# Patient Record
Sex: MALE | Race: WHITE | Employment: OTHER | URBAN - METROPOLITAN AREA
[De-identification: names, ages, dates, MRNs, and addresses within clinical notes are randomized per-mention and may not be internally consistent; named-entity substitution may affect disease eponyms.]

---

## 2017-06-02 PROBLEM — I63.9 CRYPTOGENIC STROKE (HCC): Status: ACTIVE | Noted: 2017-06-02

## 2017-09-18 PROBLEM — Z86.73 HISTORY OF CVA (CEREBROVASCULAR ACCIDENT) WITHOUT RESIDUAL DEFICITS: Status: ACTIVE | Noted: 2017-09-18

## 2017-09-18 PROBLEM — G45.9 TIA (TRANSIENT ISCHEMIC ATTACK): Status: ACTIVE | Noted: 2017-09-18

## 2018-04-10 ENCOUNTER — NURSE ONLY (OUTPATIENT)
Dept: NON INVASIVE DIAGNOSTICS | Age: 68
End: 2018-04-10
Payer: MEDICARE

## 2018-04-10 DIAGNOSIS — Z95.818 STATUS POST PLACEMENT OF IMPLANTABLE LOOP RECORDER: Primary | ICD-10-CM

## 2018-04-10 DIAGNOSIS — I63.512 CEREBROVASCULAR ACCIDENT (CVA) DUE TO OCCLUSION OF LEFT MIDDLE CEREBRAL ARTERY (HCC): ICD-10-CM

## 2018-04-10 PROCEDURE — 93299 PR PR INTERRO EVALM REMOTE, UP TO 30 DAYS: CPT | Performed by: INTERNAL MEDICINE

## 2018-04-10 PROCEDURE — 93298 REM INTERROG DEV EVAL SCRMS: CPT | Performed by: INTERNAL MEDICINE

## 2018-04-13 ENCOUNTER — TELEPHONE (OUTPATIENT)
Dept: NON INVASIVE DIAGNOSTICS | Age: 68
End: 2018-04-13

## 2018-05-14 ENCOUNTER — OFFICE VISIT (OUTPATIENT)
Dept: NON INVASIVE DIAGNOSTICS | Age: 68
End: 2018-05-14
Payer: MEDICARE

## 2018-05-14 VITALS
RESPIRATION RATE: 16 BRPM | HEART RATE: 68 BPM | BODY MASS INDEX: 35.04 KG/M2 | DIASTOLIC BLOOD PRESSURE: 80 MMHG | WEIGHT: 264.4 LBS | HEIGHT: 73 IN | SYSTOLIC BLOOD PRESSURE: 120 MMHG

## 2018-05-14 DIAGNOSIS — Z95.818 STATUS POST PLACEMENT OF IMPLANTABLE LOOP RECORDER: Primary | ICD-10-CM

## 2018-05-14 PROCEDURE — 3017F COLORECTAL CA SCREEN DOC REV: CPT | Performed by: NURSE PRACTITIONER

## 2018-05-14 PROCEDURE — G8427 DOCREV CUR MEDS BY ELIG CLIN: HCPCS | Performed by: NURSE PRACTITIONER

## 2018-05-14 PROCEDURE — 4040F PNEUMOC VAC/ADMIN/RCVD: CPT | Performed by: NURSE PRACTITIONER

## 2018-05-14 PROCEDURE — 1123F ACP DISCUSS/DSCN MKR DOCD: CPT | Performed by: NURSE PRACTITIONER

## 2018-05-14 PROCEDURE — G8417 CALC BMI ABV UP PARAM F/U: HCPCS | Performed by: NURSE PRACTITIONER

## 2018-05-14 PROCEDURE — G8598 ASA/ANTIPLAT THER USED: HCPCS | Performed by: NURSE PRACTITIONER

## 2018-05-14 PROCEDURE — 93291 INTERROG DEV EVAL SCRMS IP: CPT | Performed by: INTERNAL MEDICINE

## 2018-05-14 PROCEDURE — 4004F PT TOBACCO SCREEN RCVD TLK: CPT | Performed by: NURSE PRACTITIONER

## 2018-05-14 PROCEDURE — 99213 OFFICE O/P EST LOW 20 MIN: CPT | Performed by: NURSE PRACTITIONER

## 2018-05-19 ENCOUNTER — APPOINTMENT (OUTPATIENT)
Dept: CT IMAGING | Age: 68
DRG: 065 | End: 2018-05-19
Payer: MEDICARE

## 2018-05-19 ENCOUNTER — HOSPITAL ENCOUNTER (INPATIENT)
Age: 68
LOS: 4 days | Discharge: NURSING, REHAB OR LONG-TERM CARE | DRG: 065 | End: 2018-05-23
Attending: EMERGENCY MEDICINE | Admitting: INTERNAL MEDICINE
Payer: MEDICARE

## 2018-05-19 ENCOUNTER — APPOINTMENT (OUTPATIENT)
Dept: GENERAL RADIOLOGY | Age: 68
DRG: 065 | End: 2018-05-19
Payer: MEDICARE

## 2018-05-19 DIAGNOSIS — I63.9 ACUTE ISCHEMIC STROKE (HCC): Primary | ICD-10-CM

## 2018-05-19 PROBLEM — Z86.73 HISTORY OF TIA (TRANSIENT ISCHEMIC ATTACK): Chronic | Status: ACTIVE | Noted: 2017-09-18

## 2018-05-19 PROBLEM — Z86.73 HISTORY OF CVA (CEREBROVASCULAR ACCIDENT) WITHOUT RESIDUAL DEFICITS: Chronic | Status: ACTIVE | Noted: 2017-09-18

## 2018-05-19 LAB
ALBUMIN SERPL-MCNC: 3.9 G/DL (ref 3.5–5.2)
ALP BLD-CCNC: 28 U/L (ref 40–129)
ALT SERPL-CCNC: 30 U/L (ref 0–40)
ANION GAP SERPL CALCULATED.3IONS-SCNC: 12 MMOL/L (ref 7–16)
AST SERPL-CCNC: 59 U/L (ref 0–39)
BASOPHILS ABSOLUTE: 0.04 E9/L (ref 0–0.2)
BASOPHILS RELATIVE PERCENT: 0.3 % (ref 0–2)
BILIRUB SERPL-MCNC: 0.3 MG/DL (ref 0–1.2)
BILIRUBIN URINE: NEGATIVE
BLOOD, URINE: NEGATIVE
BUN BLDV-MCNC: 18 MG/DL (ref 8–23)
CALCIUM SERPL-MCNC: 8.5 MG/DL (ref 8.6–10.2)
CHLORIDE BLD-SCNC: 101 MMOL/L (ref 98–107)
CHP ED QC CHECK: YES
CHP ED QC CHECK: YES
CLARITY: CLEAR
CO2: 23 MMOL/L (ref 22–29)
COLOR: YELLOW
CREAT SERPL-MCNC: 0.8 MG/DL (ref 0.7–1.2)
EKG ATRIAL RATE: 79 BPM
EKG P AXIS: 45 DEGREES
EKG P-R INTERVAL: 152 MS
EKG Q-T INTERVAL: 372 MS
EKG QRS DURATION: 84 MS
EKG QTC CALCULATION (BAZETT): 426 MS
EKG R AXIS: 34 DEGREES
EKG T AXIS: 99 DEGREES
EKG VENTRICULAR RATE: 79 BPM
EOSINOPHILS ABSOLUTE: 0.09 E9/L (ref 0.05–0.5)
EOSINOPHILS RELATIVE PERCENT: 0.6 % (ref 0–6)
GFR AFRICAN AMERICAN: >60
GFR AFRICAN AMERICAN: >60
GFR NON-AFRICAN AMERICAN: >60 ML/MIN/1.73
GFR NON-AFRICAN AMERICAN: >60 ML/MIN/1.73
GLUCOSE BLD-MCNC: 132 MG/DL (ref 74–109)
GLUCOSE BLD-MCNC: 146 MG/DL (ref 74–109)
GLUCOSE BLD-MCNC: 159 MG/DL
GLUCOSE BLD-MCNC: 159 MG/DL
GLUCOSE URINE: NEGATIVE MG/DL
HCT VFR BLD CALC: 46.9 % (ref 37–54)
HEMOGLOBIN: 16 G/DL (ref 12.5–16.5)
IMMATURE GRANULOCYTES #: 0.07 E9/L
IMMATURE GRANULOCYTES %: 0.5 % (ref 0–5)
INR BLD: 1.1
KETONES, URINE: NEGATIVE MG/DL
LEUKOCYTE ESTERASE, URINE: NEGATIVE
LYMPHOCYTES ABSOLUTE: 2.64 E9/L (ref 1.5–4)
LYMPHOCYTES RELATIVE PERCENT: 17.2 % (ref 20–42)
MCH RBC QN AUTO: 32.3 PG (ref 26–35)
MCHC RBC AUTO-ENTMCNC: 34.1 % (ref 32–34.5)
MCV RBC AUTO: 94.6 FL (ref 80–99.9)
METER GLUCOSE: 159 MG/DL (ref 70–110)
MONOCYTES ABSOLUTE: 0.63 E9/L (ref 0.1–0.95)
MONOCYTES RELATIVE PERCENT: 4.1 % (ref 2–12)
NEUTROPHILS ABSOLUTE: 11.9 E9/L (ref 1.8–7.3)
NEUTROPHILS RELATIVE PERCENT: 77.3 % (ref 43–80)
NITRITE, URINE: NEGATIVE
PDW BLD-RTO: 13.2 FL (ref 11.5–15)
PH UA: 5.5 (ref 5–9)
PLATELET # BLD: 227 E9/L (ref 130–450)
PMV BLD AUTO: 10 FL (ref 7–12)
POC CHLORIDE: 107 MMOL/L (ref 100–108)
POC CREATININE: 1 MG/DL (ref 0.7–1.2)
POC POTASSIUM: 4.5 MMOL/L (ref 3.5–5)
POC SODIUM: 138 MMOL/L (ref 132–146)
POTASSIUM SERPL-SCNC: 8.3 MMOL/L (ref 3.5–5)
PROTEIN UA: NEGATIVE MG/DL
PROTHROMBIN TIME: 12.3 SEC (ref 9.3–12.4)
RBC # BLD: 4.96 E12/L (ref 3.8–5.8)
SODIUM BLD-SCNC: 136 MMOL/L (ref 132–146)
SPECIFIC GRAVITY UA: 1.01 (ref 1–1.03)
TOTAL PROTEIN: 6.9 G/DL (ref 6.4–8.3)
TROPONIN: <0.01 NG/ML (ref 0–0.03)
UROBILINOGEN, URINE: 0.2 E.U./DL
WBC # BLD: 15.4 E9/L (ref 4.5–11.5)

## 2018-05-19 PROCEDURE — 2580000003 HC RX 258: Performed by: FAMILY MEDICINE

## 2018-05-19 PROCEDURE — 84132 ASSAY OF SERUM POTASSIUM: CPT

## 2018-05-19 PROCEDURE — 81003 URINALYSIS AUTO W/O SCOPE: CPT

## 2018-05-19 PROCEDURE — 72128 CT CHEST SPINE W/O DYE: CPT

## 2018-05-19 PROCEDURE — 36415 COLL VENOUS BLD VENIPUNCTURE: CPT

## 2018-05-19 PROCEDURE — 6360000002 HC RX W HCPCS: Performed by: EMERGENCY MEDICINE

## 2018-05-19 PROCEDURE — 2060000000 HC ICU INTERMEDIATE R&B

## 2018-05-19 PROCEDURE — 6360000004 HC RX CONTRAST MEDICATION: Performed by: RADIOLOGY

## 2018-05-19 PROCEDURE — 82962 GLUCOSE BLOOD TEST: CPT

## 2018-05-19 PROCEDURE — 70450 CT HEAD/BRAIN W/O DYE: CPT

## 2018-05-19 PROCEDURE — 80053 COMPREHEN METABOLIC PANEL: CPT

## 2018-05-19 PROCEDURE — 70496 CT ANGIOGRAPHY HEAD: CPT

## 2018-05-19 PROCEDURE — 72131 CT LUMBAR SPINE W/O DYE: CPT

## 2018-05-19 PROCEDURE — 0042T CT BRAIN PERFUSION: CPT

## 2018-05-19 PROCEDURE — 84484 ASSAY OF TROPONIN QUANT: CPT

## 2018-05-19 PROCEDURE — 82947 ASSAY GLUCOSE BLOOD QUANT: CPT

## 2018-05-19 PROCEDURE — 71045 X-RAY EXAM CHEST 1 VIEW: CPT

## 2018-05-19 PROCEDURE — 6370000000 HC RX 637 (ALT 250 FOR IP): Performed by: EMERGENCY MEDICINE

## 2018-05-19 PROCEDURE — 99291 CRITICAL CARE FIRST HOUR: CPT

## 2018-05-19 PROCEDURE — 94760 N-INVAS EAR/PLS OXIMETRY 1: CPT

## 2018-05-19 PROCEDURE — 85025 COMPLETE CBC W/AUTO DIFF WBC: CPT

## 2018-05-19 PROCEDURE — 85610 PROTHROMBIN TIME: CPT

## 2018-05-19 PROCEDURE — 84295 ASSAY OF SERUM SODIUM: CPT

## 2018-05-19 PROCEDURE — 93005 ELECTROCARDIOGRAM TRACING: CPT | Performed by: EMERGENCY MEDICINE

## 2018-05-19 PROCEDURE — 6360000002 HC RX W HCPCS: Performed by: FAMILY MEDICINE

## 2018-05-19 PROCEDURE — 70498 CT ANGIOGRAPHY NECK: CPT

## 2018-05-19 PROCEDURE — 6370000000 HC RX 637 (ALT 250 FOR IP): Performed by: FAMILY MEDICINE

## 2018-05-19 PROCEDURE — 82565 ASSAY OF CREATININE: CPT

## 2018-05-19 PROCEDURE — 82435 ASSAY OF BLOOD CHLORIDE: CPT

## 2018-05-19 RX ORDER — METOPROLOL TARTRATE 50 MG/1
50 TABLET, FILM COATED ORAL 2 TIMES DAILY
Status: DISCONTINUED | OUTPATIENT
Start: 2018-05-19 | End: 2018-05-19

## 2018-05-19 RX ORDER — METOPROLOL TARTRATE 50 MG/1
50 TABLET, FILM COATED ORAL 2 TIMES DAILY
Status: DISCONTINUED | OUTPATIENT
Start: 2018-05-19 | End: 2018-05-23 | Stop reason: HOSPADM

## 2018-05-19 RX ORDER — SODIUM CHLORIDE 0.9 % (FLUSH) 0.9 %
10 SYRINGE (ML) INJECTION EVERY 12 HOURS SCHEDULED
Status: DISCONTINUED | OUTPATIENT
Start: 2018-05-19 | End: 2018-05-23 | Stop reason: HOSPADM

## 2018-05-19 RX ORDER — METOPROLOL TARTRATE 50 MG/1
50 TABLET, FILM COATED ORAL 2 TIMES DAILY
COMMUNITY

## 2018-05-19 RX ORDER — KETOROLAC TROMETHAMINE 30 MG/ML
15 INJECTION, SOLUTION INTRAMUSCULAR; INTRAVENOUS ONCE
Status: COMPLETED | OUTPATIENT
Start: 2018-05-19 | End: 2018-05-19

## 2018-05-19 RX ORDER — ACETAMINOPHEN 325 MG/1
650 TABLET ORAL EVERY 4 HOURS PRN
Status: DISCONTINUED | OUTPATIENT
Start: 2018-05-19 | End: 2018-05-23 | Stop reason: HOSPADM

## 2018-05-19 RX ORDER — IBUPROFEN 200 MG
400 TABLET ORAL PRN
Status: ON HOLD | COMMUNITY
End: 2018-05-23 | Stop reason: HOSPADM

## 2018-05-19 RX ORDER — CAPTOPRIL 12.5 MG/1
50 TABLET ORAL 2 TIMES DAILY
Status: DISCONTINUED | OUTPATIENT
Start: 2018-05-19 | End: 2018-05-22

## 2018-05-19 RX ORDER — ENALAPRILAT 2.5 MG/2ML
1.25 INJECTION INTRAVENOUS EVERY 6 HOURS PRN
Status: DISCONTINUED | OUTPATIENT
Start: 2018-05-19 | End: 2018-05-23 | Stop reason: HOSPADM

## 2018-05-19 RX ORDER — ASPIRIN 300 MG/1
300 SUPPOSITORY RECTAL ONCE
Status: COMPLETED | OUTPATIENT
Start: 2018-05-19 | End: 2018-05-19

## 2018-05-19 RX ORDER — CAPTOPRIL 50 MG/1
50 TABLET ORAL 3 TIMES DAILY
Status: ON HOLD | COMMUNITY
End: 2021-04-30 | Stop reason: HOSPADM

## 2018-05-19 RX ORDER — SODIUM CHLORIDE 0.9 % (FLUSH) 0.9 %
10 SYRINGE (ML) INJECTION PRN
Status: DISCONTINUED | OUTPATIENT
Start: 2018-05-19 | End: 2018-05-23 | Stop reason: HOSPADM

## 2018-05-19 RX ORDER — ATORVASTATIN CALCIUM 40 MG/1
80 TABLET, FILM COATED ORAL DAILY
Status: DISCONTINUED | OUTPATIENT
Start: 2018-05-19 | End: 2018-05-23 | Stop reason: HOSPADM

## 2018-05-19 RX ORDER — CLOPIDOGREL BISULFATE 75 MG/1
75 TABLET ORAL DAILY
Status: DISCONTINUED | OUTPATIENT
Start: 2018-05-19 | End: 2018-05-23 | Stop reason: HOSPADM

## 2018-05-19 RX ORDER — ACETAMINOPHEN 325 MG/1
650 TABLET ORAL PRN
COMMUNITY
End: 2018-05-19

## 2018-05-19 RX ORDER — ASPIRIN 81 MG/1
81 TABLET ORAL EVERY OTHER DAY
Status: DISCONTINUED | OUTPATIENT
Start: 2018-05-20 | End: 2018-05-21

## 2018-05-19 RX ORDER — ONDANSETRON 2 MG/ML
4 INJECTION INTRAMUSCULAR; INTRAVENOUS EVERY 6 HOURS PRN
Status: DISCONTINUED | OUTPATIENT
Start: 2018-05-19 | End: 2018-05-23 | Stop reason: HOSPADM

## 2018-05-19 RX ORDER — ACETAMINOPHEN,DIPHENHYDRAMINE HCL 500; 25 MG/1; MG/1
2 TABLET, FILM COATED ORAL NIGHTLY PRN
Status: ON HOLD | COMMUNITY
End: 2018-05-23 | Stop reason: HOSPADM

## 2018-05-19 RX ADMIN — CAPTOPRIL 50 MG: 12.5 TABLET ORAL at 19:50

## 2018-05-19 RX ADMIN — Medication 10 ML: at 19:50

## 2018-05-19 RX ADMIN — METOPROLOL TARTRATE 50 MG: 50 TABLET ORAL at 19:49

## 2018-05-19 RX ADMIN — CLOPIDOGREL 75 MG: 75 TABLET, FILM COATED ORAL at 19:50

## 2018-05-19 RX ADMIN — IOPAMIDOL 100 ML: 755 INJECTION, SOLUTION INTRAVENOUS at 11:54

## 2018-05-19 RX ADMIN — ASPIRIN 300 MG: 300 SUPPOSITORY RECTAL at 13:06

## 2018-05-19 RX ADMIN — ATORVASTATIN CALCIUM 80 MG: 40 TABLET, FILM COATED ORAL at 19:50

## 2018-05-19 RX ADMIN — ENOXAPARIN SODIUM 40 MG: 40 INJECTION SUBCUTANEOUS at 17:27

## 2018-05-19 RX ADMIN — ACETAMINOPHEN 650 MG: 325 TABLET, FILM COATED ORAL at 22:43

## 2018-05-19 RX ADMIN — KETOROLAC TROMETHAMINE 15 MG: 30 INJECTION, SOLUTION INTRAMUSCULAR; INTRAVENOUS at 14:49

## 2018-05-19 ASSESSMENT — PAIN DESCRIPTION - ORIENTATION
ORIENTATION: RIGHT
ORIENTATION: RIGHT
ORIENTATION: LEFT

## 2018-05-19 ASSESSMENT — PAIN SCALES - GENERAL
PAINLEVEL_OUTOF10: 5
PAINLEVEL_OUTOF10: 2
PAINLEVEL_OUTOF10: 3
PAINLEVEL_OUTOF10: 5
PAINLEVEL_OUTOF10: 0

## 2018-05-19 ASSESSMENT — PAIN DESCRIPTION - ONSET: ONSET: GRADUAL

## 2018-05-19 ASSESSMENT — PAIN DESCRIPTION - PAIN TYPE
TYPE: ACUTE PAIN

## 2018-05-19 ASSESSMENT — PAIN DESCRIPTION - LOCATION
LOCATION: BACK;LEG
LOCATION: HEAD;EYE
LOCATION: HEAD

## 2018-05-19 ASSESSMENT — PAIN DESCRIPTION - FREQUENCY
FREQUENCY: CONTINUOUS
FREQUENCY: CONTINUOUS

## 2018-05-19 ASSESSMENT — PAIN DESCRIPTION - DESCRIPTORS
DESCRIPTORS: HEADACHE
DESCRIPTORS: HEADACHE;DISCOMFORT;ACHING

## 2018-05-19 ASSESSMENT — PAIN DESCRIPTION - PROGRESSION: CLINICAL_PROGRESSION: NOT CHANGED

## 2018-05-20 ENCOUNTER — APPOINTMENT (OUTPATIENT)
Dept: GENERAL RADIOLOGY | Age: 68
DRG: 065 | End: 2018-05-20
Payer: MEDICARE

## 2018-05-20 LAB
ANION GAP SERPL CALCULATED.3IONS-SCNC: 12 MMOL/L (ref 7–16)
BUN BLDV-MCNC: 19 MG/DL (ref 8–23)
CALCIUM SERPL-MCNC: 8.7 MG/DL (ref 8.6–10.2)
CHLORIDE BLD-SCNC: 102 MMOL/L (ref 98–107)
CO2: 24 MMOL/L (ref 22–29)
CREAT SERPL-MCNC: 1 MG/DL (ref 0.7–1.2)
GFR AFRICAN AMERICAN: >60
GFR NON-AFRICAN AMERICAN: >60 ML/MIN/1.73
GLUCOSE BLD-MCNC: 109 MG/DL (ref 74–109)
HCT VFR BLD CALC: 43.1 % (ref 37–54)
HEMOGLOBIN: 14.8 G/DL (ref 12.5–16.5)
MCH RBC QN AUTO: 32.4 PG (ref 26–35)
MCHC RBC AUTO-ENTMCNC: 34.3 % (ref 32–34.5)
MCV RBC AUTO: 94.3 FL (ref 80–99.9)
PDW BLD-RTO: 13.4 FL (ref 11.5–15)
PLATELET # BLD: 197 E9/L (ref 130–450)
PMV BLD AUTO: 9.6 FL (ref 7–12)
POTASSIUM REFLEX MAGNESIUM: 4 MMOL/L (ref 3.5–5)
RBC # BLD: 4.57 E12/L (ref 3.8–5.8)
SODIUM BLD-SCNC: 138 MMOL/L (ref 132–146)
WBC # BLD: 12.2 E9/L (ref 4.5–11.5)

## 2018-05-20 PROCEDURE — 6370000000 HC RX 637 (ALT 250 FOR IP): Performed by: FAMILY MEDICINE

## 2018-05-20 PROCEDURE — 80048 BASIC METABOLIC PNL TOTAL CA: CPT

## 2018-05-20 PROCEDURE — 73552 X-RAY EXAM OF FEMUR 2/>: CPT

## 2018-05-20 PROCEDURE — 6360000002 HC RX W HCPCS: Performed by: FAMILY MEDICINE

## 2018-05-20 PROCEDURE — 2700000000 HC OXYGEN THERAPY PER DAY

## 2018-05-20 PROCEDURE — G8988 SELF CARE GOAL STATUS: HCPCS

## 2018-05-20 PROCEDURE — 2060000000 HC ICU INTERMEDIATE R&B

## 2018-05-20 PROCEDURE — G8987 SELF CARE CURRENT STATUS: HCPCS

## 2018-05-20 PROCEDURE — 2580000003 HC RX 258: Performed by: FAMILY MEDICINE

## 2018-05-20 PROCEDURE — 97535 SELF CARE MNGMENT TRAINING: CPT

## 2018-05-20 PROCEDURE — 85027 COMPLETE CBC AUTOMATED: CPT

## 2018-05-20 PROCEDURE — 73562 X-RAY EXAM OF KNEE 3: CPT

## 2018-05-20 PROCEDURE — 97167 OT EVAL HIGH COMPLEX 60 MIN: CPT

## 2018-05-20 PROCEDURE — 36415 COLL VENOUS BLD VENIPUNCTURE: CPT

## 2018-05-20 RX ORDER — HYDROCODONE BITARTRATE AND ACETAMINOPHEN 5; 325 MG/1; MG/1
1 TABLET ORAL EVERY 4 HOURS PRN
Status: DISCONTINUED | OUTPATIENT
Start: 2018-05-20 | End: 2018-05-22

## 2018-05-20 RX ORDER — HYDROCODONE BITARTRATE AND ACETAMINOPHEN 5; 325 MG/1; MG/1
2 TABLET ORAL EVERY 4 HOURS PRN
Status: DISCONTINUED | OUTPATIENT
Start: 2018-05-20 | End: 2018-05-21

## 2018-05-20 RX ADMIN — METOPROLOL TARTRATE 50 MG: 50 TABLET ORAL at 09:16

## 2018-05-20 RX ADMIN — Medication 10 ML: at 09:15

## 2018-05-20 RX ADMIN — ASPIRIN 81 MG: 81 TABLET, COATED ORAL at 09:16

## 2018-05-20 RX ADMIN — HYDROCODONE BITARTRATE AND ACETAMINOPHEN 2 TABLET: 5; 325 TABLET ORAL at 17:45

## 2018-05-20 RX ADMIN — ACETAMINOPHEN 650 MG: 325 TABLET, FILM COATED ORAL at 03:52

## 2018-05-20 RX ADMIN — HYDROCODONE BITARTRATE AND ACETAMINOPHEN 2 TABLET: 5; 325 TABLET ORAL at 22:13

## 2018-05-20 RX ADMIN — HYDROCODONE BITARTRATE AND ACETAMINOPHEN 2 TABLET: 5; 325 TABLET ORAL at 13:40

## 2018-05-20 RX ADMIN — CLOPIDOGREL 75 MG: 75 TABLET, FILM COATED ORAL at 09:16

## 2018-05-20 RX ADMIN — CAPTOPRIL 50 MG: 12.5 TABLET ORAL at 20:02

## 2018-05-20 RX ADMIN — CAPTOPRIL 50 MG: 12.5 TABLET ORAL at 09:16

## 2018-05-20 RX ADMIN — ENOXAPARIN SODIUM 40 MG: 40 INJECTION SUBCUTANEOUS at 09:15

## 2018-05-20 RX ADMIN — METOPROLOL TARTRATE 50 MG: 50 TABLET ORAL at 20:02

## 2018-05-20 RX ADMIN — ACETAMINOPHEN 650 MG: 325 TABLET, FILM COATED ORAL at 09:15

## 2018-05-20 RX ADMIN — ATORVASTATIN CALCIUM 80 MG: 40 TABLET, FILM COATED ORAL at 09:16

## 2018-05-20 RX ADMIN — Medication 10 ML: at 20:03

## 2018-05-20 ASSESSMENT — PAIN DESCRIPTION - ORIENTATION
ORIENTATION: LEFT

## 2018-05-20 ASSESSMENT — PAIN DESCRIPTION - DESCRIPTORS: DESCRIPTORS: SPASM;SHOOTING;SHARP

## 2018-05-20 ASSESSMENT — PAIN SCALES - GENERAL
PAINLEVEL_OUTOF10: 5
PAINLEVEL_OUTOF10: 0
PAINLEVEL_OUTOF10: 8
PAINLEVEL_OUTOF10: 3
PAINLEVEL_OUTOF10: 9
PAINLEVEL_OUTOF10: 7
PAINLEVEL_OUTOF10: 3
PAINLEVEL_OUTOF10: 7
PAINLEVEL_OUTOF10: 5
PAINLEVEL_OUTOF10: 6

## 2018-05-20 ASSESSMENT — PAIN DESCRIPTION - LOCATION
LOCATION: LEG

## 2018-05-20 ASSESSMENT — PAIN DESCRIPTION - PAIN TYPE
TYPE: ACUTE PAIN

## 2018-05-21 ENCOUNTER — APPOINTMENT (OUTPATIENT)
Dept: MRI IMAGING | Age: 68
DRG: 065 | End: 2018-05-21
Payer: MEDICARE

## 2018-05-21 ENCOUNTER — APPOINTMENT (OUTPATIENT)
Dept: GENERAL RADIOLOGY | Age: 68
DRG: 065 | End: 2018-05-21
Payer: MEDICARE

## 2018-05-21 LAB
CHOLESTEROL, TOTAL: 99 MG/DL (ref 0–199)
HBA1C MFR BLD: 5.6 % (ref 4.8–5.9)
HDLC SERPL-MCNC: 39 MG/DL
LDL CHOLESTEROL CALCULATED: 39 MG/DL (ref 0–99)
TRIGL SERPL-MCNC: 103 MG/DL (ref 0–149)
VLDLC SERPL CALC-MCNC: 21 MG/DL

## 2018-05-21 PROCEDURE — 92523 SPEECH SOUND LANG COMPREHEN: CPT

## 2018-05-21 PROCEDURE — 36415 COLL VENOUS BLD VENIPUNCTURE: CPT

## 2018-05-21 PROCEDURE — 92611 MOTION FLUOROSCOPY/SWALLOW: CPT

## 2018-05-21 PROCEDURE — 2580000003 HC RX 258: Performed by: INTERNAL MEDICINE

## 2018-05-21 PROCEDURE — 80061 LIPID PANEL: CPT

## 2018-05-21 PROCEDURE — 6370000000 HC RX 637 (ALT 250 FOR IP): Performed by: FAMILY MEDICINE

## 2018-05-21 PROCEDURE — 2580000003 HC RX 258: Performed by: FAMILY MEDICINE

## 2018-05-21 PROCEDURE — 99223 1ST HOSP IP/OBS HIGH 75: CPT | Performed by: PSYCHIATRY & NEUROLOGY

## 2018-05-21 PROCEDURE — 92610 EVALUATE SWALLOWING FUNCTION: CPT

## 2018-05-21 PROCEDURE — 70551 MRI BRAIN STEM W/O DYE: CPT

## 2018-05-21 PROCEDURE — 2060000000 HC ICU INTERMEDIATE R&B

## 2018-05-21 PROCEDURE — 6370000000 HC RX 637 (ALT 250 FOR IP): Performed by: INTERNAL MEDICINE

## 2018-05-21 PROCEDURE — APPNB60 APP NON BILLABLE TIME 46-60 MINS: Performed by: NURSE PRACTITIONER

## 2018-05-21 PROCEDURE — 6360000002 HC RX W HCPCS: Performed by: FAMILY MEDICINE

## 2018-05-21 PROCEDURE — 74230 X-RAY XM SWLNG FUNCJ C+: CPT

## 2018-05-21 PROCEDURE — 83036 HEMOGLOBIN GLYCOSYLATED A1C: CPT

## 2018-05-21 RX ORDER — ASPIRIN 81 MG/1
81 TABLET ORAL DAILY
Status: DISCONTINUED | OUTPATIENT
Start: 2018-05-21 | End: 2018-05-23 | Stop reason: HOSPADM

## 2018-05-21 RX ORDER — COLCHICINE 0.6 MG/1
0.6 TABLET ORAL
Status: DISCONTINUED | OUTPATIENT
Start: 2018-05-21 | End: 2018-05-22

## 2018-05-21 RX ORDER — SODIUM CHLORIDE 9 MG/ML
INJECTION, SOLUTION INTRAVENOUS CONTINUOUS
Status: DISCONTINUED | OUTPATIENT
Start: 2018-05-21 | End: 2018-05-22

## 2018-05-21 RX ADMIN — COLCHICINE 0.6 MG: 0.6 TABLET, FILM COATED ORAL at 12:30

## 2018-05-21 RX ADMIN — CAPTOPRIL 50 MG: 12.5 TABLET ORAL at 20:22

## 2018-05-21 RX ADMIN — ATORVASTATIN CALCIUM 80 MG: 40 TABLET, FILM COATED ORAL at 09:36

## 2018-05-21 RX ADMIN — SODIUM CHLORIDE: 9 INJECTION, SOLUTION INTRAVENOUS at 09:35

## 2018-05-21 RX ADMIN — HYDROCODONE BITARTRATE AND ACETAMINOPHEN 1 TABLET: 5; 325 TABLET ORAL at 18:28

## 2018-05-21 RX ADMIN — ENOXAPARIN SODIUM 40 MG: 40 INJECTION SUBCUTANEOUS at 09:35

## 2018-05-21 RX ADMIN — Medication 10 ML: at 09:35

## 2018-05-21 RX ADMIN — ASPIRIN 81 MG: 81 TABLET, COATED ORAL at 09:36

## 2018-05-21 RX ADMIN — CLOPIDOGREL 75 MG: 75 TABLET, FILM COATED ORAL at 09:36

## 2018-05-21 RX ADMIN — HYDROCODONE BITARTRATE AND ACETAMINOPHEN 1 TABLET: 5; 325 TABLET ORAL at 12:30

## 2018-05-21 RX ADMIN — CAPTOPRIL 50 MG: 12.5 TABLET ORAL at 09:36

## 2018-05-21 RX ADMIN — METOPROLOL TARTRATE 50 MG: 50 TABLET ORAL at 20:22

## 2018-05-21 RX ADMIN — METOPROLOL TARTRATE 50 MG: 50 TABLET ORAL at 09:36

## 2018-05-21 ASSESSMENT — PAIN DESCRIPTION - LOCATION
LOCATION: KNEE
LOCATION: LEG

## 2018-05-21 ASSESSMENT — PAIN DESCRIPTION - PROGRESSION
CLINICAL_PROGRESSION: NOT CHANGED
CLINICAL_PROGRESSION: NOT CHANGED

## 2018-05-21 ASSESSMENT — PAIN SCALES - GENERAL
PAINLEVEL_OUTOF10: 2
PAINLEVEL_OUTOF10: 0
PAINLEVEL_OUTOF10: 2
PAINLEVEL_OUTOF10: 5
PAINLEVEL_OUTOF10: 0
PAINLEVEL_OUTOF10: 6

## 2018-05-21 ASSESSMENT — PAIN DESCRIPTION - ONSET
ONSET: ON-GOING
ONSET: ON-GOING

## 2018-05-21 ASSESSMENT — PAIN DESCRIPTION - ORIENTATION
ORIENTATION: LEFT
ORIENTATION: LEFT

## 2018-05-21 ASSESSMENT — PAIN DESCRIPTION - PAIN TYPE
TYPE: CHRONIC PAIN
TYPE: CHRONIC PAIN

## 2018-05-21 ASSESSMENT — PAIN DESCRIPTION - DESCRIPTORS
DESCRIPTORS: CRAMPING;SPASM
DESCRIPTORS: CRAMPING

## 2018-05-21 ASSESSMENT — PAIN DESCRIPTION - FREQUENCY
FREQUENCY: INTERMITTENT
FREQUENCY: INTERMITTENT

## 2018-05-22 LAB
LV EF: 48 %
LVEF MODALITY: NORMAL

## 2018-05-22 PROCEDURE — 2060000000 HC ICU INTERMEDIATE R&B

## 2018-05-22 PROCEDURE — 2580000003 HC RX 258: Performed by: FAMILY MEDICINE

## 2018-05-22 PROCEDURE — 2580000003 HC RX 258: Performed by: INTERNAL MEDICINE

## 2018-05-22 PROCEDURE — 6360000004 HC RX CONTRAST MEDICATION: Performed by: FAMILY MEDICINE

## 2018-05-22 PROCEDURE — 94660 CPAP INITIATION&MGMT: CPT

## 2018-05-22 PROCEDURE — G8979 MOBILITY GOAL STATUS: HCPCS

## 2018-05-22 PROCEDURE — 99232 SBSQ HOSP IP/OBS MODERATE 35: CPT | Performed by: NURSE PRACTITIONER

## 2018-05-22 PROCEDURE — 6370000000 HC RX 637 (ALT 250 FOR IP): Performed by: INTERNAL MEDICINE

## 2018-05-22 PROCEDURE — 97162 PT EVAL MOD COMPLEX 30 MIN: CPT

## 2018-05-22 PROCEDURE — 6370000000 HC RX 637 (ALT 250 FOR IP): Performed by: FAMILY MEDICINE

## 2018-05-22 PROCEDURE — 6370000000 HC RX 637 (ALT 250 FOR IP): Performed by: NURSE PRACTITIONER

## 2018-05-22 PROCEDURE — 93307 TTE W/O DOPPLER COMPLETE: CPT

## 2018-05-22 PROCEDURE — 97530 THERAPEUTIC ACTIVITIES: CPT

## 2018-05-22 PROCEDURE — G8978 MOBILITY CURRENT STATUS: HCPCS

## 2018-05-22 PROCEDURE — 6360000002 HC RX W HCPCS: Performed by: FAMILY MEDICINE

## 2018-05-22 RX ORDER — BACLOFEN 10 MG/1
10 TABLET ORAL 3 TIMES DAILY
Status: DISCONTINUED | OUTPATIENT
Start: 2018-05-22 | End: 2018-05-23 | Stop reason: HOSPADM

## 2018-05-22 RX ORDER — CAPTOPRIL 12.5 MG/1
50 TABLET ORAL 3 TIMES DAILY
Status: DISCONTINUED | OUTPATIENT
Start: 2018-05-22 | End: 2018-05-23 | Stop reason: HOSPADM

## 2018-05-22 RX ADMIN — METOPROLOL TARTRATE 50 MG: 50 TABLET ORAL at 20:55

## 2018-05-22 RX ADMIN — Medication 10 ML: at 20:55

## 2018-05-22 RX ADMIN — METOPROLOL TARTRATE 50 MG: 50 TABLET ORAL at 09:26

## 2018-05-22 RX ADMIN — SODIUM CHLORIDE: 9 INJECTION, SOLUTION INTRAVENOUS at 04:06

## 2018-05-22 RX ADMIN — BACLOFEN 10 MG: 10 TABLET ORAL at 20:55

## 2018-05-22 RX ADMIN — PERFLUTREN 1.65 MG: 6.52 INJECTION, SUSPENSION INTRAVENOUS at 09:26

## 2018-05-22 RX ADMIN — ATORVASTATIN CALCIUM 80 MG: 40 TABLET, FILM COATED ORAL at 09:26

## 2018-05-22 RX ADMIN — BACLOFEN 10 MG: 10 TABLET ORAL at 13:24

## 2018-05-22 RX ADMIN — ENOXAPARIN SODIUM 40 MG: 40 INJECTION SUBCUTANEOUS at 09:26

## 2018-05-22 RX ADMIN — CAPTOPRIL 50 MG: 12.5 TABLET ORAL at 09:26

## 2018-05-22 RX ADMIN — ASPIRIN 81 MG: 81 TABLET, COATED ORAL at 09:26

## 2018-05-22 RX ADMIN — HYDROCODONE BITARTRATE AND ACETAMINOPHEN 1 TABLET: 5; 325 TABLET ORAL at 04:11

## 2018-05-22 RX ADMIN — CAPTOPRIL 50 MG: 12.5 TABLET ORAL at 13:26

## 2018-05-22 RX ADMIN — CLOPIDOGREL 75 MG: 75 TABLET, FILM COATED ORAL at 09:26

## 2018-05-22 RX ADMIN — CAPTOPRIL 50 MG: 12.5 TABLET ORAL at 20:55

## 2018-05-22 ASSESSMENT — PAIN SCALES - GENERAL
PAINLEVEL_OUTOF10: 5
PAINLEVEL_OUTOF10: 0

## 2018-05-22 ASSESSMENT — PAIN DESCRIPTION - DESCRIPTORS: DESCRIPTORS: CRAMPING;SPASM

## 2018-05-22 ASSESSMENT — PAIN DESCRIPTION - FREQUENCY: FREQUENCY: INTERMITTENT

## 2018-05-22 ASSESSMENT — PAIN DESCRIPTION - PAIN TYPE: TYPE: CHRONIC PAIN

## 2018-05-22 ASSESSMENT — PAIN DESCRIPTION - ORIENTATION: ORIENTATION: LEFT

## 2018-05-22 ASSESSMENT — PAIN DESCRIPTION - PROGRESSION: CLINICAL_PROGRESSION: GRADUALLY WORSENING

## 2018-05-22 ASSESSMENT — PAIN DESCRIPTION - LOCATION: LOCATION: LEG

## 2018-05-22 ASSESSMENT — PAIN DESCRIPTION - ONSET: ONSET: ON-GOING

## 2018-05-23 VITALS
DIASTOLIC BLOOD PRESSURE: 82 MMHG | RESPIRATION RATE: 16 BRPM | BODY MASS INDEX: 34.91 KG/M2 | OXYGEN SATURATION: 95 % | WEIGHT: 263.38 LBS | HEART RATE: 76 BPM | SYSTOLIC BLOOD PRESSURE: 154 MMHG | HEIGHT: 73 IN | TEMPERATURE: 99.3 F

## 2018-05-23 PROCEDURE — 97110 THERAPEUTIC EXERCISES: CPT

## 2018-05-23 PROCEDURE — 94660 CPAP INITIATION&MGMT: CPT

## 2018-05-23 PROCEDURE — 2580000003 HC RX 258: Performed by: FAMILY MEDICINE

## 2018-05-23 PROCEDURE — 6360000002 HC RX W HCPCS: Performed by: FAMILY MEDICINE

## 2018-05-23 PROCEDURE — 97112 NEUROMUSCULAR REEDUCATION: CPT

## 2018-05-23 PROCEDURE — 2700000000 HC OXYGEN THERAPY PER DAY

## 2018-05-23 PROCEDURE — 97530 THERAPEUTIC ACTIVITIES: CPT

## 2018-05-23 PROCEDURE — 6370000000 HC RX 637 (ALT 250 FOR IP): Performed by: FAMILY MEDICINE

## 2018-05-23 PROCEDURE — 6370000000 HC RX 637 (ALT 250 FOR IP): Performed by: NURSE PRACTITIONER

## 2018-05-23 PROCEDURE — 6370000000 HC RX 637 (ALT 250 FOR IP): Performed by: INTERNAL MEDICINE

## 2018-05-23 RX ORDER — BACLOFEN 10 MG/1
10 TABLET ORAL 3 TIMES DAILY
DISCHARGE
Start: 2018-05-23

## 2018-05-23 RX ADMIN — CLOPIDOGREL 75 MG: 75 TABLET, FILM COATED ORAL at 09:22

## 2018-05-23 RX ADMIN — ENOXAPARIN SODIUM 40 MG: 40 INJECTION SUBCUTANEOUS at 09:22

## 2018-05-23 RX ADMIN — CAPTOPRIL 50 MG: 12.5 TABLET ORAL at 09:22

## 2018-05-23 RX ADMIN — BACLOFEN 10 MG: 10 TABLET ORAL at 14:35

## 2018-05-23 RX ADMIN — ATORVASTATIN CALCIUM 80 MG: 40 TABLET, FILM COATED ORAL at 09:22

## 2018-05-23 RX ADMIN — METOPROLOL TARTRATE 50 MG: 50 TABLET ORAL at 09:23

## 2018-05-23 RX ADMIN — CAPTOPRIL 50 MG: 12.5 TABLET ORAL at 14:35

## 2018-05-23 RX ADMIN — ACETAMINOPHEN 650 MG: 325 TABLET, FILM COATED ORAL at 04:06

## 2018-05-23 RX ADMIN — Medication 10 ML: at 09:22

## 2018-05-23 RX ADMIN — BACLOFEN 10 MG: 10 TABLET ORAL at 09:22

## 2018-05-23 RX ADMIN — ASPIRIN 81 MG: 81 TABLET, COATED ORAL at 09:22

## 2018-05-23 ASSESSMENT — PAIN SCALES - GENERAL
PAINLEVEL_OUTOF10: 0
PAINLEVEL_OUTOF10: 4

## 2019-02-25 ENCOUNTER — TELEPHONE (OUTPATIENT)
Dept: NON INVASIVE DIAGNOSTICS | Age: 69
End: 2019-02-25

## 2019-02-25 NOTE — TELEPHONE ENCOUNTER
Spoke with Mrs. Jessica Guadalupe. She will send a transmission today or tomorrow.      Taylor Masters

## 2019-03-25 ENCOUNTER — TELEPHONE (OUTPATIENT)
Dept: NON INVASIVE DIAGNOSTICS | Age: 69
End: 2019-03-25

## 2019-10-15 ENCOUNTER — TELEPHONE (OUTPATIENT)
Dept: NON INVASIVE DIAGNOSTICS | Age: 69
End: 2019-10-15

## 2020-04-15 ENCOUNTER — TELEPHONE (OUTPATIENT)
Dept: NON INVASIVE DIAGNOSTICS | Age: 70
End: 2020-04-15

## 2021-04-13 ENCOUNTER — APPOINTMENT (OUTPATIENT)
Dept: CT IMAGING | Age: 71
DRG: 024 | End: 2021-04-13
Payer: MEDICARE

## 2021-04-13 ENCOUNTER — HOSPITAL ENCOUNTER (INPATIENT)
Age: 71
LOS: 2 days | Discharge: NURSING, REHAB OR LONG-TERM CARE | DRG: 024 | End: 2021-04-15
Attending: EMERGENCY MEDICINE | Admitting: INTERNAL MEDICINE
Payer: MEDICARE

## 2021-04-13 ENCOUNTER — ANESTHESIA (OUTPATIENT)
Dept: INTERVENTIONAL RADIOLOGY/VASCULAR | Age: 71
DRG: 024 | End: 2021-04-13
Payer: MEDICARE

## 2021-04-13 ENCOUNTER — APPOINTMENT (OUTPATIENT)
Dept: INTERVENTIONAL RADIOLOGY/VASCULAR | Age: 71
DRG: 024 | End: 2021-04-13
Payer: MEDICARE

## 2021-04-13 ENCOUNTER — APPOINTMENT (OUTPATIENT)
Dept: GENERAL RADIOLOGY | Age: 71
DRG: 024 | End: 2021-04-13
Payer: MEDICARE

## 2021-04-13 ENCOUNTER — ANESTHESIA EVENT (OUTPATIENT)
Dept: INTERVENTIONAL RADIOLOGY/VASCULAR | Age: 71
DRG: 024 | End: 2021-04-13
Payer: MEDICARE

## 2021-04-13 VITALS — SYSTOLIC BLOOD PRESSURE: 112 MMHG | TEMPERATURE: 96.8 F | OXYGEN SATURATION: 96 % | DIASTOLIC BLOOD PRESSURE: 66 MMHG

## 2021-04-13 DIAGNOSIS — I63.9 ACUTE CVA (CEREBROVASCULAR ACCIDENT) (HCC): Primary | ICD-10-CM

## 2021-04-13 PROBLEM — I63.20 ISCHEMIC CEREBRAL VASCULAR ACCIDENT (CVA) DUE TO STENOSIS OF LARGE EXTRACRANIAL ARTERY (HCC): Status: ACTIVE | Noted: 2021-04-13

## 2021-04-13 PROBLEM — R76.8 RED BLOOD CELL ANTIBODY POSITIVE: Status: ACTIVE | Noted: 2021-04-13

## 2021-04-13 LAB
ABO/RH: NORMAL
ALBUMIN SERPL-MCNC: 3.9 G/DL (ref 3.5–5.2)
ALP BLD-CCNC: 70 U/L (ref 40–129)
ALT SERPL-CCNC: 22 U/L (ref 0–40)
ANION GAP SERPL CALCULATED.3IONS-SCNC: 12 MMOL/L (ref 7–16)
ANION GAP SERPL CALCULATED.3IONS-SCNC: 9 MMOL/L (ref 7–16)
ANTIBODY IDENTIFICATION: NORMAL
ANTIBODY SCREEN: NORMAL
APTT: 33.7 SEC (ref 24.5–35.1)
AST SERPL-CCNC: 21 U/L (ref 0–39)
BASOPHILS ABSOLUTE: 0.05 E9/L (ref 0–0.2)
BASOPHILS RELATIVE PERCENT: 0.4 % (ref 0–2)
BILIRUB SERPL-MCNC: 0.2 MG/DL (ref 0–1.2)
BUN BLDV-MCNC: 13 MG/DL (ref 8–23)
BUN BLDV-MCNC: 13 MG/DL (ref 8–23)
CALCIUM SERPL-MCNC: 8.1 MG/DL (ref 8.6–10.2)
CALCIUM SERPL-MCNC: 8.8 MG/DL (ref 8.6–10.2)
CHLORIDE BLD-SCNC: 101 MMOL/L (ref 98–107)
CHLORIDE BLD-SCNC: 104 MMOL/L (ref 98–107)
CHOLESTEROL, TOTAL: 93 MG/DL (ref 0–199)
CO2: 24 MMOL/L (ref 22–29)
CO2: 24 MMOL/L (ref 22–29)
CREAT SERPL-MCNC: 0.8 MG/DL (ref 0.7–1.2)
CREAT SERPL-MCNC: 0.9 MG/DL (ref 0.7–1.2)
DAT POLYSPECIFIC: NORMAL
EOSINOPHILS ABSOLUTE: 0.26 E9/L (ref 0.05–0.5)
EOSINOPHILS RELATIVE PERCENT: 2 % (ref 0–6)
GFR AFRICAN AMERICAN: >60
GFR AFRICAN AMERICAN: >60
GFR NON-AFRICAN AMERICAN: >60 ML/MIN/1.73
GFR NON-AFRICAN AMERICAN: >60 ML/MIN/1.73
GLUCOSE BLD-MCNC: 127 MG/DL (ref 74–99)
GLUCOSE BLD-MCNC: 151 MG/DL (ref 74–99)
HBA1C MFR BLD: 5.3 % (ref 4–5.6)
HCT VFR BLD CALC: 40.3 % (ref 37–54)
HDLC SERPL-MCNC: 49 MG/DL
HEMOGLOBIN: 12.7 G/DL (ref 12.5–16.5)
IMMATURE GRANULOCYTES #: 0.04 E9/L
IMMATURE GRANULOCYTES %: 0.3 % (ref 0–5)
INR BLD: 1
LACTIC ACID: 2.1 MMOL/L (ref 0.5–2.2)
LDL CHOLESTEROL CALCULATED: 33 MG/DL (ref 0–99)
LYMPHOCYTES ABSOLUTE: 3.05 E9/L (ref 1.5–4)
LYMPHOCYTES RELATIVE PERCENT: 23.7 % (ref 20–42)
MAGNESIUM: 1.9 MG/DL (ref 1.6–2.6)
MCH RBC QN AUTO: 28.3 PG (ref 26–35)
MCHC RBC AUTO-ENTMCNC: 31.5 % (ref 32–34.5)
MCV RBC AUTO: 90 FL (ref 80–99.9)
METER GLUCOSE: 119 MG/DL (ref 74–99)
METER GLUCOSE: 121 MG/DL (ref 74–99)
MONOCYTES ABSOLUTE: 0.81 E9/L (ref 0.1–0.95)
MONOCYTES RELATIVE PERCENT: 6.3 % (ref 2–12)
NEUTROPHILS ABSOLUTE: 8.66 E9/L (ref 1.8–7.3)
NEUTROPHILS RELATIVE PERCENT: 67.3 % (ref 43–80)
PDW BLD-RTO: 15.2 FL (ref 11.5–15)
PHOSPHORUS: 3.4 MG/DL (ref 2.5–4.5)
PLATELET # BLD: 236 E9/L (ref 130–450)
PMV BLD AUTO: 10.2 FL (ref 7–12)
POTASSIUM REFLEX MAGNESIUM: 4.4 MMOL/L (ref 3.5–5)
POTASSIUM SERPL-SCNC: 4.8 MMOL/L (ref 3.5–5)
PROTHROMBIN TIME: 11.3 SEC (ref 9.3–12.4)
RBC # BLD: 4.48 E12/L (ref 3.8–5.8)
SARS-COV-2, NAAT: NOT DETECTED
SODIUM BLD-SCNC: 137 MMOL/L (ref 132–146)
SODIUM BLD-SCNC: 137 MMOL/L (ref 132–146)
TOTAL PROTEIN: 6.2 G/DL (ref 6.4–8.3)
TRIGL SERPL-MCNC: 55 MG/DL (ref 0–149)
VLDLC SERPL CALC-MCNC: 11 MG/DL
WBC # BLD: 12.9 E9/L (ref 4.5–11.5)

## 2021-04-13 PROCEDURE — 80048 BASIC METABOLIC PNL TOTAL CA: CPT

## 2021-04-13 PROCEDURE — 2580000003 HC RX 258: Performed by: PSYCHIATRY & NEUROLOGY

## 2021-04-13 PROCEDURE — 2580000003 HC RX 258

## 2021-04-13 PROCEDURE — 2000000000 HC ICU R&B

## 2021-04-13 PROCEDURE — 86850 RBC ANTIBODY SCREEN: CPT

## 2021-04-13 PROCEDURE — 97530 THERAPEUTIC ACTIVITIES: CPT

## 2021-04-13 PROCEDURE — 6360000004 HC RX CONTRAST MEDICATION: Performed by: RADIOLOGY

## 2021-04-13 PROCEDURE — 80061 LIPID PANEL: CPT

## 2021-04-13 PROCEDURE — 2500000003 HC RX 250 WO HCPCS: Performed by: NURSE ANESTHETIST, CERTIFIED REGISTERED

## 2021-04-13 PROCEDURE — 6360000002 HC RX W HCPCS: Performed by: EMERGENCY MEDICINE

## 2021-04-13 PROCEDURE — 82962 GLUCOSE BLOOD TEST: CPT

## 2021-04-13 PROCEDURE — 92523 SPEECH SOUND LANG COMPREHEN: CPT | Performed by: SPEECH-LANGUAGE PATHOLOGIST

## 2021-04-13 PROCEDURE — 3700000001 HC ADD 15 MINUTES (ANESTHESIA)

## 2021-04-13 PROCEDURE — 87635 SARS-COV-2 COVID-19 AMP PRB: CPT

## 2021-04-13 PROCEDURE — 03CG3ZZ EXTIRPATION OF MATTER FROM INTRACRANIAL ARTERY, PERCUTANEOUS APPROACH: ICD-10-PCS | Performed by: PSYCHIATRY & NEUROLOGY

## 2021-04-13 PROCEDURE — 6370000000 HC RX 637 (ALT 250 FOR IP): Performed by: SURGERY

## 2021-04-13 PROCEDURE — 86880 COOMBS TEST DIRECT: CPT

## 2021-04-13 PROCEDURE — 3700000000 HC ANESTHESIA ATTENDED CARE

## 2021-04-13 PROCEDURE — 6360000004 HC RX CONTRAST MEDICATION: Performed by: PSYCHIATRY & NEUROLOGY

## 2021-04-13 PROCEDURE — 86870 RBC ANTIBODY IDENTIFICATION: CPT

## 2021-04-13 PROCEDURE — 80053 COMPREHEN METABOLIC PANEL: CPT

## 2021-04-13 PROCEDURE — 2580000003 HC RX 258: Performed by: NURSE ANESTHETIST, CERTIFIED REGISTERED

## 2021-04-13 PROCEDURE — 0042T CT BRAIN PERFUSION: CPT | Performed by: RADIOLOGY

## 2021-04-13 PROCEDURE — 86900 BLOOD TYPING SEROLOGIC ABO: CPT

## 2021-04-13 PROCEDURE — 92610 EVALUATE SWALLOWING FUNCTION: CPT | Performed by: SPEECH-LANGUAGE PATHOLOGIST

## 2021-04-13 PROCEDURE — 73502 X-RAY EXAM HIP UNI 2-3 VIEWS: CPT

## 2021-04-13 PROCEDURE — C1769 GUIDE WIRE: HCPCS

## 2021-04-13 PROCEDURE — 85610 PROTHROMBIN TIME: CPT

## 2021-04-13 PROCEDURE — 84100 ASSAY OF PHOSPHORUS: CPT

## 2021-04-13 PROCEDURE — 6360000002 HC RX W HCPCS: Performed by: CLINICAL NURSE SPECIALIST

## 2021-04-13 PROCEDURE — 37195 THROMBOLYTIC THERAPY STROKE: CPT

## 2021-04-13 PROCEDURE — 85025 COMPLETE CBC W/AUTO DIFF WBC: CPT

## 2021-04-13 PROCEDURE — 76377 3D RENDER W/INTRP POSTPROCES: CPT

## 2021-04-13 PROCEDURE — 2500000003 HC RX 250 WO HCPCS: Performed by: PSYCHIATRY & NEUROLOGY

## 2021-04-13 PROCEDURE — 6360000002 HC RX W HCPCS: Performed by: NURSE ANESTHETIST, CERTIFIED REGISTERED

## 2021-04-13 PROCEDURE — 6360000002 HC RX W HCPCS

## 2021-04-13 PROCEDURE — 70450 CT HEAD/BRAIN W/O DYE: CPT

## 2021-04-13 PROCEDURE — 6370000000 HC RX 637 (ALT 250 FOR IP): Performed by: INTERNAL MEDICINE

## 2021-04-13 PROCEDURE — 86901 BLOOD TYPING SEROLOGIC RH(D): CPT

## 2021-04-13 PROCEDURE — 3E06317 INTRODUCTION OF OTHER THROMBOLYTIC INTO CENTRAL ARTERY, PERCUTANEOUS APPROACH: ICD-10-PCS | Performed by: PSYCHIATRY & NEUROLOGY

## 2021-04-13 PROCEDURE — 97166 OT EVAL MOD COMPLEX 45 MIN: CPT

## 2021-04-13 PROCEDURE — 99283 EMERGENCY DEPT VISIT LOW MDM: CPT

## 2021-04-13 PROCEDURE — 83735 ASSAY OF MAGNESIUM: CPT

## 2021-04-13 PROCEDURE — 61645 PERQ ART M-THROMBECT &/NFS: CPT

## 2021-04-13 PROCEDURE — 2500000003 HC RX 250 WO HCPCS

## 2021-04-13 PROCEDURE — 2500000003 HC RX 250 WO HCPCS: Performed by: CLINICAL NURSE SPECIALIST

## 2021-04-13 PROCEDURE — 2580000003 HC RX 258: Performed by: INTERNAL MEDICINE

## 2021-04-13 PROCEDURE — 0042T CT BRAIN PERFUSION: CPT

## 2021-04-13 PROCEDURE — 70498 CT ANGIOGRAPHY NECK: CPT

## 2021-04-13 PROCEDURE — 99222 1ST HOSP IP/OBS MODERATE 55: CPT | Performed by: PSYCHIATRY & NEUROLOGY

## 2021-04-13 PROCEDURE — 36415 COLL VENOUS BLD VENIPUNCTURE: CPT

## 2021-04-13 PROCEDURE — 70498 CT ANGIOGRAPHY NECK: CPT | Performed by: RADIOLOGY

## 2021-04-13 PROCEDURE — 97162 PT EVAL MOD COMPLEX 30 MIN: CPT

## 2021-04-13 PROCEDURE — 83605 ASSAY OF LACTIC ACID: CPT

## 2021-04-13 PROCEDURE — 6360000002 HC RX W HCPCS: Performed by: PSYCHIATRY & NEUROLOGY

## 2021-04-13 PROCEDURE — 85730 THROMBOPLASTIN TIME PARTIAL: CPT

## 2021-04-13 PROCEDURE — 70496 CT ANGIOGRAPHY HEAD: CPT

## 2021-04-13 PROCEDURE — 70496 CT ANGIOGRAPHY HEAD: CPT | Performed by: RADIOLOGY

## 2021-04-13 PROCEDURE — 83036 HEMOGLOBIN GLYCOSYLATED A1C: CPT

## 2021-04-13 RX ORDER — PROMETHAZINE HYDROCHLORIDE 25 MG/1
12.5 TABLET ORAL EVERY 6 HOURS PRN
Status: DISCONTINUED | OUTPATIENT
Start: 2021-04-13 | End: 2021-04-13

## 2021-04-13 RX ORDER — HYDRALAZINE HYDROCHLORIDE 20 MG/ML
5 INJECTION INTRAMUSCULAR; INTRAVENOUS EVERY 10 MIN PRN
Status: DISCONTINUED | OUTPATIENT
Start: 2021-04-13 | End: 2021-04-13

## 2021-04-13 RX ORDER — SODIUM CHLORIDE 0.9 % (FLUSH) 0.9 %
5-40 SYRINGE (ML) INJECTION PRN
Status: DISCONTINUED | OUTPATIENT
Start: 2021-04-13 | End: 2021-04-15 | Stop reason: HOSPADM

## 2021-04-13 RX ORDER — SODIUM CHLORIDE 0.9 % (FLUSH) 0.9 %
5-40 SYRINGE (ML) INJECTION EVERY 12 HOURS SCHEDULED
Status: DISCONTINUED | OUTPATIENT
Start: 2021-04-13 | End: 2021-04-15 | Stop reason: HOSPADM

## 2021-04-13 RX ORDER — SODIUM CHLORIDE 9 MG/ML
25 INJECTION, SOLUTION INTRAVENOUS PRN
Status: DISCONTINUED | OUTPATIENT
Start: 2021-04-13 | End: 2021-04-13 | Stop reason: SDUPTHER

## 2021-04-13 RX ORDER — DEXTROSE MONOHYDRATE 25 G/50ML
12.5 INJECTION, SOLUTION INTRAVENOUS
Status: ACTIVE | OUTPATIENT
Start: 2021-04-13 | End: 2021-04-13

## 2021-04-13 RX ORDER — HEPARIN SODIUM 10000 [USP'U]/ML
INJECTION, SOLUTION INTRAVENOUS; SUBCUTANEOUS
Status: COMPLETED | OUTPATIENT
Start: 2021-04-13 | End: 2021-04-13

## 2021-04-13 RX ORDER — SUCCINYLCHOLINE CHLORIDE 20 MG/ML
INJECTION INTRAMUSCULAR; INTRAVENOUS PRN
Status: DISCONTINUED | OUTPATIENT
Start: 2021-04-13 | End: 2021-04-13 | Stop reason: SDUPTHER

## 2021-04-13 RX ORDER — ATORVASTATIN CALCIUM 40 MG/1
80 TABLET, FILM COATED ORAL NIGHTLY
Status: DISCONTINUED | OUTPATIENT
Start: 2021-04-13 | End: 2021-04-13

## 2021-04-13 RX ORDER — ATORVASTATIN CALCIUM 80 MG/1
80 TABLET, FILM COATED ORAL DAILY
Status: DISCONTINUED | OUTPATIENT
Start: 2021-04-13 | End: 2021-04-15 | Stop reason: HOSPADM

## 2021-04-13 RX ORDER — ACETAMINOPHEN 325 MG/1
650 TABLET ORAL EVERY 6 HOURS PRN
Status: DISCONTINUED | OUTPATIENT
Start: 2021-04-13 | End: 2021-04-14

## 2021-04-13 RX ORDER — PROMETHAZINE HYDROCHLORIDE 25 MG/ML
6.25 INJECTION, SOLUTION INTRAMUSCULAR; INTRAVENOUS
Status: DISCONTINUED | OUTPATIENT
Start: 2021-04-13 | End: 2021-04-13

## 2021-04-13 RX ORDER — MAGNESIUM SULFATE IN WATER 40 MG/ML
2000 INJECTION, SOLUTION INTRAVENOUS ONCE
Status: COMPLETED | OUTPATIENT
Start: 2021-04-13 | End: 2021-04-13

## 2021-04-13 RX ORDER — FENTANYL CITRATE 50 UG/ML
INJECTION, SOLUTION INTRAMUSCULAR; INTRAVENOUS PRN
Status: DISCONTINUED | OUTPATIENT
Start: 2021-04-13 | End: 2021-04-13 | Stop reason: SDUPTHER

## 2021-04-13 RX ORDER — ETOMIDATE 2 MG/ML
INJECTION, SOLUTION INTRAVENOUS PRN
Status: DISCONTINUED | OUTPATIENT
Start: 2021-04-13 | End: 2021-04-13 | Stop reason: SDUPTHER

## 2021-04-13 RX ORDER — POLYETHYLENE GLYCOL 3350 17 G/17G
17 POWDER, FOR SOLUTION ORAL DAILY
Status: DISCONTINUED | OUTPATIENT
Start: 2021-04-13 | End: 2021-04-15 | Stop reason: HOSPADM

## 2021-04-13 RX ORDER — ACETAMINOPHEN 650 MG/1
650 SUPPOSITORY RECTAL EVERY 6 HOURS PRN
Status: DISCONTINUED | OUTPATIENT
Start: 2021-04-13 | End: 2021-04-14

## 2021-04-13 RX ORDER — OXYCODONE HYDROCHLORIDE 5 MG/1
2.5 TABLET ORAL EVERY 4 HOURS PRN
Status: DISCONTINUED | OUTPATIENT
Start: 2021-04-13 | End: 2021-04-15

## 2021-04-13 RX ORDER — METOPROLOL TARTRATE 50 MG/1
50 TABLET, FILM COATED ORAL 2 TIMES DAILY
Status: DISCONTINUED | OUTPATIENT
Start: 2021-04-13 | End: 2021-04-15 | Stop reason: HOSPADM

## 2021-04-13 RX ORDER — HYDRALAZINE HYDROCHLORIDE 20 MG/ML
5 INJECTION INTRAMUSCULAR; INTRAVENOUS EVERY 10 MIN PRN
Status: DISCONTINUED | OUTPATIENT
Start: 2021-04-13 | End: 2021-04-14

## 2021-04-13 RX ORDER — PHENYLEPHRINE HYDROCHLORIDE 10 MG/ML
INJECTION INTRAVENOUS PRN
Status: DISCONTINUED | OUTPATIENT
Start: 2021-04-13 | End: 2021-04-13 | Stop reason: SDUPTHER

## 2021-04-13 RX ORDER — SODIUM CHLORIDE 0.9 % (FLUSH) 0.9 %
5-40 SYRINGE (ML) INJECTION PRN
Status: DISCONTINUED | OUTPATIENT
Start: 2021-04-13 | End: 2021-04-13

## 2021-04-13 RX ORDER — ONDANSETRON 2 MG/ML
INJECTION INTRAMUSCULAR; INTRAVENOUS PRN
Status: DISCONTINUED | OUTPATIENT
Start: 2021-04-13 | End: 2021-04-13 | Stop reason: SDUPTHER

## 2021-04-13 RX ORDER — SENNA PLUS 8.6 MG/1
1 TABLET ORAL NIGHTLY
Status: DISCONTINUED | OUTPATIENT
Start: 2021-04-13 | End: 2021-04-15 | Stop reason: HOSPADM

## 2021-04-13 RX ORDER — BACLOFEN 10 MG/1
10 TABLET ORAL 3 TIMES DAILY
Status: DISCONTINUED | OUTPATIENT
Start: 2021-04-13 | End: 2021-04-15 | Stop reason: HOSPADM

## 2021-04-13 RX ORDER — LIDOCAINE HYDROCHLORIDE 20 MG/ML
INJECTION, SOLUTION EPIDURAL; INFILTRATION; INTRACAUDAL; PERINEURAL PRN
Status: DISCONTINUED | OUTPATIENT
Start: 2021-04-13 | End: 2021-04-13 | Stop reason: SDUPTHER

## 2021-04-13 RX ORDER — SODIUM CHLORIDE 9 MG/ML
25 INJECTION, SOLUTION INTRAVENOUS PRN
Status: DISCONTINUED | OUTPATIENT
Start: 2021-04-13 | End: 2021-04-15 | Stop reason: HOSPADM

## 2021-04-13 RX ORDER — SODIUM CHLORIDE 9 MG/ML
INJECTION, SOLUTION INTRAVENOUS CONTINUOUS PRN
Status: DISCONTINUED | OUTPATIENT
Start: 2021-04-13 | End: 2021-04-13 | Stop reason: SDUPTHER

## 2021-04-13 RX ORDER — SODIUM CHLORIDE 0.9 % (FLUSH) 0.9 %
5-40 SYRINGE (ML) INJECTION EVERY 12 HOURS SCHEDULED
Status: DISCONTINUED | OUTPATIENT
Start: 2021-04-13 | End: 2021-04-13

## 2021-04-13 RX ORDER — ONDANSETRON 2 MG/ML
4 INJECTION INTRAMUSCULAR; INTRAVENOUS EVERY 6 HOURS PRN
Status: DISCONTINUED | OUTPATIENT
Start: 2021-04-13 | End: 2021-04-13

## 2021-04-13 RX ORDER — MEPERIDINE HYDROCHLORIDE 25 MG/ML
12.5 INJECTION INTRAMUSCULAR; INTRAVENOUS; SUBCUTANEOUS EVERY 5 MIN PRN
Status: DISCONTINUED | OUTPATIENT
Start: 2021-04-13 | End: 2021-04-13

## 2021-04-13 RX ORDER — SODIUM CHLORIDE 9 MG/ML
INJECTION, SOLUTION INTRAVENOUS PRN
Status: DISCONTINUED | OUTPATIENT
Start: 2021-04-13 | End: 2021-04-13

## 2021-04-13 RX ORDER — POLYETHYLENE GLYCOL 3350 17 G/17G
17 POWDER, FOR SOLUTION ORAL DAILY PRN
Status: DISCONTINUED | OUTPATIENT
Start: 2021-04-13 | End: 2021-04-14

## 2021-04-13 RX ORDER — CAPTOPRIL 12.5 MG/1
50 TABLET ORAL 3 TIMES DAILY
Status: DISCONTINUED | OUTPATIENT
Start: 2021-04-13 | End: 2021-04-15 | Stop reason: HOSPADM

## 2021-04-13 RX ORDER — 0.9 % SODIUM CHLORIDE 0.9 %
50 INTRAVENOUS SOLUTION INTRAVENOUS ONCE
Status: DISCONTINUED | OUTPATIENT
Start: 2021-04-13 | End: 2021-04-13

## 2021-04-13 RX ORDER — LABETALOL HYDROCHLORIDE 5 MG/ML
5 INJECTION, SOLUTION INTRAVENOUS EVERY 10 MIN PRN
Status: DISCONTINUED | OUTPATIENT
Start: 2021-04-13 | End: 2021-04-14

## 2021-04-13 RX ORDER — MORPHINE SULFATE 2 MG/ML
2 INJECTION, SOLUTION INTRAMUSCULAR; INTRAVENOUS EVERY 5 MIN PRN
Status: DISCONTINUED | OUTPATIENT
Start: 2021-04-13 | End: 2021-04-13

## 2021-04-13 RX ORDER — LABETALOL HYDROCHLORIDE 5 MG/ML
5 INJECTION, SOLUTION INTRAVENOUS EVERY 10 MIN PRN
Status: DISCONTINUED | OUTPATIENT
Start: 2021-04-13 | End: 2021-04-13

## 2021-04-13 RX ORDER — ONDANSETRON 4 MG/1
4 TABLET, ORALLY DISINTEGRATING ORAL EVERY 8 HOURS PRN
Status: DISCONTINUED | OUTPATIENT
Start: 2021-04-13 | End: 2021-04-13

## 2021-04-13 RX ORDER — ONDANSETRON 2 MG/ML
4 INJECTION INTRAMUSCULAR; INTRAVENOUS EVERY 6 HOURS PRN
Status: DISCONTINUED | OUTPATIENT
Start: 2021-04-13 | End: 2021-04-15 | Stop reason: HOSPADM

## 2021-04-13 RX ORDER — ACETAMINOPHEN 325 MG/1
650 TABLET ORAL EVERY 4 HOURS PRN
Status: DISCONTINUED | OUTPATIENT
Start: 2021-04-13 | End: 2021-04-15 | Stop reason: HOSPADM

## 2021-04-13 RX ORDER — SODIUM CHLORIDE 9 MG/ML
INJECTION, SOLUTION INTRAVENOUS CONTINUOUS
Status: DISCONTINUED | OUTPATIENT
Start: 2021-04-13 | End: 2021-04-13

## 2021-04-13 RX ADMIN — ETOMIDATE 16 MG: 2 INJECTION, SOLUTION INTRAVENOUS at 03:03

## 2021-04-13 RX ADMIN — PHENYLEPHRINE HYDROCHLORIDE 100 MCG: 10 INJECTION INTRAVENOUS at 03:18

## 2021-04-13 RX ADMIN — LIDOCAINE HYDROCHLORIDE 100 MG: 20 INJECTION, SOLUTION EPIDURAL; INFILTRATION; INTRACAUDAL; PERINEURAL at 03:03

## 2021-04-13 RX ADMIN — FAMOTIDINE 20 MG: 10 INJECTION INTRAVENOUS at 10:16

## 2021-04-13 RX ADMIN — NITROGLYCERIN: 20 INJECTION INTRAVENOUS at 03:21

## 2021-04-13 RX ADMIN — ALTEPLASE 81 MG: KIT at 02:07

## 2021-04-13 RX ADMIN — FAMOTIDINE 20 MG: 10 INJECTION INTRAVENOUS at 20:47

## 2021-04-13 RX ADMIN — Medication 15000 UNITS: at 03:21

## 2021-04-13 RX ADMIN — ONDANSETRON HYDROCHLORIDE 4 MG: 2 INJECTION, SOLUTION INTRAMUSCULAR; INTRAVENOUS at 03:51

## 2021-04-13 RX ADMIN — ALTEPLASE 9 MG: KIT at 02:06

## 2021-04-13 RX ADMIN — MAGNESIUM SULFATE HEPTAHYDRATE 2000 MG: 40 INJECTION, SOLUTION INTRAVENOUS at 15:18

## 2021-04-13 RX ADMIN — BACLOFEN 10 MG: 10 TABLET ORAL at 20:47

## 2021-04-13 RX ADMIN — IOPAMIDOL 100 ML: 755 INJECTION, SOLUTION INTRAVENOUS at 01:38

## 2021-04-13 RX ADMIN — FENTANYL CITRATE 100 MCG: 50 INJECTION, SOLUTION INTRAMUSCULAR; INTRAVENOUS at 03:03

## 2021-04-13 RX ADMIN — CAPTOPRIL 50 MG: 12.5 TABLET ORAL at 20:47

## 2021-04-13 RX ADMIN — Medication 10 ML: at 20:49

## 2021-04-13 RX ADMIN — SUCCINYLCHOLINE CHLORIDE 160 MG: 20 INJECTION, SOLUTION INTRAMUSCULAR; INTRAVENOUS at 03:03

## 2021-04-13 RX ADMIN — OXYCODONE HYDROCHLORIDE 2.5 MG: 5 TABLET ORAL at 20:46

## 2021-04-13 RX ADMIN — SODIUM CHLORIDE: 9 INJECTION, SOLUTION INTRAVENOUS at 02:52

## 2021-04-13 RX ADMIN — IOPAMIDOL 30 ML: 612 INJECTION, SOLUTION INTRAVENOUS at 04:20

## 2021-04-13 RX ADMIN — BACLOFEN 10 MG: 10 TABLET ORAL at 14:00

## 2021-04-13 RX ADMIN — Medication 5 ML: at 09:05

## 2021-04-13 RX ADMIN — METOPROLOL TARTRATE 50 MG: 50 TABLET, FILM COATED ORAL at 20:47

## 2021-04-13 ASSESSMENT — PULMONARY FUNCTION TESTS
PIF_VALUE: 17
PIF_VALUE: 16
PIF_VALUE: 0
PIF_VALUE: 18
PIF_VALUE: 1
PIF_VALUE: 0
PIF_VALUE: 19
PIF_VALUE: 14
PIF_VALUE: 19
PIF_VALUE: 0
PIF_VALUE: 18
PIF_VALUE: 16
PIF_VALUE: 20
PIF_VALUE: 0
PIF_VALUE: 13
PIF_VALUE: 17
PIF_VALUE: 12
PIF_VALUE: 17
PIF_VALUE: 1
PIF_VALUE: 18
PIF_VALUE: 1
PIF_VALUE: 10
PIF_VALUE: 16
PIF_VALUE: 18
PIF_VALUE: 19
PIF_VALUE: 18
PIF_VALUE: 0
PIF_VALUE: 14
PIF_VALUE: 1
PIF_VALUE: 19
PIF_VALUE: 14
PIF_VALUE: 14
PIF_VALUE: 21
PIF_VALUE: 13
PIF_VALUE: 18
PIF_VALUE: 17
PIF_VALUE: 14
PIF_VALUE: 18
PIF_VALUE: 17

## 2021-04-13 ASSESSMENT — PAIN SCALES - GENERAL
PAINLEVEL_OUTOF10: 7
PAINLEVEL_OUTOF10: 7
PAINLEVEL_OUTOF10: 0
PAINLEVEL_OUTOF10: 2
PAINLEVEL_OUTOF10: 0

## 2021-04-13 ASSESSMENT — PAIN DESCRIPTION - PROGRESSION: CLINICAL_PROGRESSION: NOT CHANGED

## 2021-04-13 ASSESSMENT — PAIN DESCRIPTION - LOCATION
LOCATION: BACK
LOCATION: BACK

## 2021-04-13 ASSESSMENT — PAIN DESCRIPTION - FREQUENCY: FREQUENCY: CONTINUOUS

## 2021-04-13 ASSESSMENT — PAIN - FUNCTIONAL ASSESSMENT: PAIN_FUNCTIONAL_ASSESSMENT: PREVENTS OR INTERFERES SOME ACTIVE ACTIVITIES AND ADLS

## 2021-04-13 ASSESSMENT — PAIN DESCRIPTION - DESCRIPTORS: DESCRIPTORS: ACHING

## 2021-04-13 ASSESSMENT — PAIN DESCRIPTION - ONSET: ONSET: AWAKENED FROM SLEEP

## 2021-04-13 NOTE — PROGRESS NOTES
Discussed pt with Dr. Param Jameson for ARU appropriateness. Pt was just admitted today and has not been evaluated by therapy. Will follow.

## 2021-04-13 NOTE — PROGRESS NOTES
Patient arrived to [facility] from IR. RNs at bedside. Placed on bedside monitor.

## 2021-04-13 NOTE — PROGRESS NOTES
OCCUPATIONAL THERAPY INITIAL EVALUATION      Date:2021  Patient Name: Ira Morse  MRN: [id number]  : 1950  Room: [address] [place] [address]-A    Referring Provider: RADHA Tucker NP    Evaluating OT: Grace Barton OTR/L [id number]      Modified Warrick Scale   Score     Description  0             No symptoms  1             No significant disability despite symptoms  2             Slight disability; able to look after own affairs  3             Moderate disability; able to ambulate without assist/ requires assist with ADLs  4             Moderate/Severe disability;requires assist to ambulate/assist with ADLs  5             Severe disability;bedridden/incontinent   6               Score:   4      AM-PAC Daily Activity Raw Score:     Recommended Adaptive Equipment: TBA: use of ADL AE, AD as indicated        Diagnosis: Acute CVA - R MCA    Reason for admission: onset of L sided weakness    Surgery/Procedures:   tPA     Pertinent Medical History: CAD, HLD, HTN, hx of CVA 3 years ago    Precautions:  Falls, Falls, L-sided weakness, L inattention, SBP<160     Home Living: Pt lives with wife  in a 1 story home with 3 step(s) to enter and 2 rail(s); bed/bath on first floor. Bathroom setup: walk in shower; standard height commode  Equipment owned: walkers, canes, WC(manual and electric), BSC, shower chair, pt reports he owns LB dressing AE    Prior Level of Function: Mod I with ADLs (using AE as needed per pt report; wife assists on occasion); Assist with IADLs. UPWalker for ambulation. Driving: yes    Pain Level: pt c/o 5/10 back pain  this session; L thigh from fall (bruising)      Cognition: A&O: 4/4    Follows 1-2 step commands with  Min cues for safety during transfers. Memory: fair   Comprehension fair   Problem solving: fair-   Judgement/safety: fair-               Communication skills: WFL           Vision: grossly WFL; reports no vision changes.  Pt demo L sided inattention/decreased body awareness. Glasses:yes                                                   Hearing: min [place]     RASS: 0  CAM-ICU: (NT) Delirium    UE Assessment:  Hand Dominance: Right [x]  Left []     ROM Strength STM goal: PRN   RUE  WFL 4/5      LUE PROM WFL  A/AROM: grossly 90 shoulder; WFL distal with impaired [facility] skills/hand manipulation skills. 3-/5 shoulder  3+/5 elbow  3-/5 wrist  3+/5 hand   WFL; 4-.5    Pt to use L UE as fair functional assist during ADLS and transfers. Sensation: No c/o numbness or tingling in extremities   Tone: WNL   Edema: min edema L hand; moderate L foot     Functional Assessment   Initial Eval Status  Date: 4/13 Treatment Status  Date: STG=LTG  7-14  days    Feeding S; set up                                       Jose Elias  while seated up in chair to increase activity tolerance & L UE hand function. Grooming Mod A                        Min A   while standing sink level & demonstrating G knowledge of compensatory techniques; using L UE as fair functional assist.     UB dressing/bathing Mod A                        Min A  demonstrating G initiation and follow through of nella dressing techniques and requiring min  cues for L sided body awareness during tasks. LB dressing/bathing Max A  seated in chair; limited by decreased L UE awareness/ decreased [facility] skills and control. Min A   using AE as needed for safe reach/ energy conservation       Toileting Dep                        Min A     Bed Mobility  Supine to sit: Mod A with HOB elevated; mod cues for technique    Sit to supine: NT                        SBA  in prep of ADL tasks & transfers   Functional Transfers Sit to stand:  Mod A    Stand to sit: Mod A  *mod cues for follow through of safe hand placement                        SBA  sit<>stand/functional bathroom transfers using AD/DME as needed for balance and safety   Functional Mobility Mod A  WW level;   2nd person to assist with walker for safety- mod cues for posture, sequencing steps                       Min A   functional/bathroom mobility using AD as needed & demonstrating G safety     Balance Sitting:     Static:  Min A    Dynamic:Min A  Standing: Mod A [employer]  S dynamic sitting balance; SBA dynamic standing balance  during ADL tasks & transfers   Endurance/Activity Tolerance   F tolerance with moderate activity. Pt fatigues quickly with transfers/mobility. Pt also limited by c/o increasing back pain. Pt positioned in chair with pillow/back support to alleviate pain. G   tolerance with moderate activity/self care routine   Visual/  Perceptual Impaired:     L sided body awareness; L UE proprioception                       Vitals:   Heart Rate at rest 93 bpm Heart Rate post session 403 bpm   SpO2 at rest 94% SpO2 post session 95%   Blood Pressure at rest 118/67 mmHg Blood Pressure post session 105/71 mmHg     Treatment: Treatment provided to achieve goals:  Bed mobility: Instruction on precautions prior to bed mobility to facilitate safe transfers and ADLS. HOB elevated to assist with patient's trunk. Pt required mod cues for technique. Balance retraining: Performed sitting balance ex's to improve righting reactions with postural changes during ADLS. Pt transferred to bedside chair for support during LB ADLs. Pt required min assist to protect L UE during tasks due to decreased awareness. ADL retraining: Instruction on adapted techniques with emphasis on L UE awareness/integration and jt protection to increase independence and safety during dressing/bathing activities. Pt demonstrated poor+ follow through. Functional transfer training:  Instruction on postural cues, hand placement/sequencing, & walker safety  to assist with balance and fall prevention during self care routine/bathroom transfers. Pt demo fair- L UE grasp onto walker; assist with walker safety due to walker proximity and decreased environmental awareness. ROM/exercise: Pt educated on L UE ROM/coordination ex's to perform bedside. Delirium Prevention: Environmental and sensory modifications assessed and implemented to decrease ICU acquired delirium and to improve overall orientation, mentation and pt interaction with family/staff. Comments: OK from RN to see patient. Upon arrival, patient supine in bed, agreeable to session. Pt demo fair tolerance with fair understanding of education/techniques. At end of session, patient left seated in chair; positioned for comfort. L UE elevated for edema control and jt protection. Call light within reach, all lines and tubes intact. Pt instructed on use of call light for assistance and fall prevention. Patient presents with decreased ROM/strength, activity tolerance, dynamic balance, functional mobility & perceptual deficits limiting completion of ADLs and safe return home. Pt can benefit from continued skilled OT to increase safety and functional independence. Evaluation Complexity: Moderate    · History: Expanded chart review of consults, imaging, and psychosocial history related to current functional performance. · Exam: 3+ performance deficits identified limiting functional independence and safe return home   · Assistance/Modification: Min/mod assistance or modifications required to perform tasks. May have comorbidities that affect occupational performance.     Assessment of current deficits   [x]Functional mobility   [x]ADLs [x]Strength  [x]Cognition  [x]Functional transfers  [x]IADLs [x]Safety Awareness  [x]Endurance  [x]Fine Motor Coordination  [x]Balance      []Vision/perception  [x]Sensation    [x]Gross Motor Coordination  [x]ROM  []Delirium                  []Communication     Plan of Care: 1-3 days/week for 1-2 weeks PRN   ADL retraining/adapted techniques and AE recommendations to increase functional independence within precautions                    Energy conservation techniques to improve tolerance for selfcare routine   Functional transfer/mobility training/DME recommendations for increased independence, safety and fall prevention         Patient/family education to increase safety and functional independence             Positioning to improve functional independence  Environmental modifications for safe mobility and completion of ADLs                           Cognitive retraining ex's to improve problem solving skills & safe participation in ADLs/IADLs  Sensory re-education techniques to improve extremity awareness, maintain skin integrity and improve hand function                             Visual/Perceptual retraining  to improve body awareness and safety during transfers and ADLs  Therapeutic activity to improve functional performance during ADLs. Therapeutic exercise to improve tolerance and functional strength for ADLs   Balance retraining/tolerance tasks for facilitation of postural control with dynamic challenges during ADLs . Neuromuscular re-education: facilitation of righting/equilibrium reactions, normalization muscle tone/facilitation active functional movement                   Delirium prevention/treatment      Rehab Potential: good for established goals    Patient / Family Goal: to go home    Patient and/or family were instructed/educated on diagnosis, prognosis/goals and plan of care. Pt demonstrated F understanding. [] Malnutrition indicators have been identified and nursing has been notified to ensure a dietitian consult is ordered.          Time In:1355             Time Out: 1230         Total Treatment Time: 20           Min Units   OT Eval Low 78659     OT Eval Medium 55699     OT Eval High 10905     OT Re-Eval [id number]     Therapeutic Ex (60) [phone]     Therapeutic Activities 61494 20 1   ADL/Self Care 70101     Orthotic Management 29819     Neuro Re-Ed 13717     Non-Billable Time        Evaluation time includes thorough review of current medical information, gathering information on past medical history/social history and prior level of function, completion of standardized testing/informal observation of tasks, assessment of data and development of POC/Goals.      Mayito Justice, OTR/L [id number]

## 2021-04-13 NOTE — ED NOTES
Time Neurologist page:      Time Stroke Alert called: 5554  :    Time Neurologist called back:    X-Ray/CT notified:     Vicente Roe  04/13/21 6122

## 2021-04-13 NOTE — PROGRESS NOTES
SPEECH/LANGUAGE PATHOLOGY  SPEECH/LANGUAGE/COGNITIVE EVALUATION      PATIENT NAME:  Elyse Moore      :  1950          TODAY'S DATE:  2021 ROOM:  3817/3817-A     ADMITTING DIAGNOSIS: Acute CVA (cerebrovascular accident) ([facility] [address] [I63.9]  Acute CVA (cerebrovascular accident) ([facility] [address] [I63.9]  Ischemic cerebral vascular accident (CVA) due to stenosis of large extracranial artery (HCC) [I63.20]    SPEECH PATHOLOGY DIAGNOSIS:    Mild cognitive-linguistic deficits, Min- Mild dysarthria    THERAPY RECOMMENDATIONS:   Speech Pathology intervention is recommended 3-6 times per week for LOS or when goals are met with emphasis on the following:     Problem solving/ insight/ judgement for various ADL/iADL tasks, including but not limited to: medication management and money/ finance management, overall safety awareness in the PLOF with 70% accuracy  Expressive Language- word finding and verbal fluency with phrase/sentence level, wh-questions and open ended conversation through incorporation of preparatory and circumlocution strategies (goal met when >90% accuracy obtained). Receptive language skills for response to [address]- questions and follow through of simple to multi-step directions with 70% accuracy. Increase speech intelligibility by    -- improving strength/ROM of the facial and lingual musculature to facilitate and sustain accuracy of articulator placement. -- articulation drill training to promote precision of phoneme production at the word and sentence levels (goal met given 90% accuracy of production with unknown context). MOTOR SPEECH       Oral Peripheral Examination   Left labiobuccal weakness    Parameters of Speech Production  Respiration:  Adequate for speech production  Articulation:  Distortion  Resonance:  Within functional limits  Quality:   Hoarse  Pitch:     Within functional limits  Intensity: Within functional limits  Fluency:  Intact  Prosody Intact    RECEPTIVE LANGUAGE    Comprehension of Yes/No Questions: Within functional limits    Process  Simple Verbal Commands:   Within functional limits  Process Intermediate Verbal Commands:   Latent  Process Complex Verbal Commands:     Latent    Comprehension of Conversation:      Latent      EXPRESSIVE LANGUAGE     Serials: Functional    Imitation:  Words   Functional     Naming:  (Modality used:  Verbal)  Confrontation Naming  Functional  Functional Description  Functional  Response Naming: Functional    Conversation:      Conversation was within functional limits    COGNITION     Attention/Orientation  Attention: Sustained attention   Orientation:  Oriented to Person, Place, Reason for hospitalization    Memory   Immediate Recall: Repeated 3/3    Delayed Recall:   Recalled 3/3    Long Term Recall:   Recalled Address, Birthdate and Family    Organization/Problem Solving/Reasoning   Verbal Sequencing:   Impaired; cueing required       Verbal Problem solving:   Impaired; cueing required           CLINICAL OBSERVATIONS NOTED DURING THE EVALUATION  Latent responses and Cueing was required                  The Speech Language Pathologist (SLP) completed education with the patient regarding results of evaluation. Explained that Speech Pathology intervention is warranted  at this time   Prognosis for improvements is fair +     This plan will be re-evaluated and revised in 1 week  if warranted. Patient stated goals: Agreed with above,   Treatment goals discussed with Patient   The Patient understand(s) the diagnosis, prognosis and plan of care       CPT code:    68122  eval speech sound lang comprehension      The admitting diagnosis and active problem list, as listed below have been reviewed prior to initiation of this evaluation.         ACTIVE PROBLEM LIST:   Patient Active Problem List   Diagnosis    Cerebrovascular accident (CVA) due to occlusion of right middle cerebral artery ([facility] [address]    Coronary artery disease involving native coronary artery of native heart without angina pectoris    History of myocardial infarction    Essential hypertension    Mixed hyperlipidemia    Status post placement of implantable loop recorder    Acute ischemic stroke ([facility] [address]    History of CVA (cerebrovascular accident) without residual deficits    History of TIA (transient ischemic attack)    Acute CVA (cerebrovascular accident) ([address]    Ischemic cerebral vascular accident (CVA) due to stenosis of large extracranial artery ([address]       TARIK oFley.   Intern

## 2021-04-13 NOTE — CONSULTS
Neuro interventional ,  Vascular and Neuro critical Care    STROKE NEUROLOGY CONSULT NOTE    Consult requested by: Attending: Giuseppe Khan MD   Reason for Consultation:  [employer]    Patient:   Hever Fontaine   : 1950   MRN: [id number]   Date of Service: 2021     -----------------------------------------------------------------------------------------------------------------  Stroke Team Contact numbers:  Uzma Oconnell MD : Please contact via [employer], Desk phone daytime [phone]  Govind Barlow MD: Please contact via [address] [address] [facility], Stroke Coordinator: [phone]   Quinton Rahman, Stroke Navigator [phone]  -----------------------------------------------------------------------------------------------------------------  Impression  1  Acute ischemic stroke in right MCA distribution status post TPA status post thrombectomy. 2.  Right nosebleed status post Rhino Rocket. Recommendations    Maintain SBP less than 160 post intervention post TPA patient. Patient is doing well today. Quick clot removed from groin  Rhino Rocket removed from right nostril    Groin looks good peripheral pulses look good. Continue to follow post TPA protocol. 24-hour CT head is pending. History of Present Illness: This is a 76-year-old male gentleman who presents to our ER on 2021 with a NIH of 15 and right MCA syndrome. He has had previous strokes in the past.  Previous CTA showing right MCA stenosis. Now showing occlusion. Review of Systems:   Constitutional: Denies fever, weight loss, fatigue and night sweats. Neurological: Denies headache, confusion, sleep difficulties, lightheadedness, spinning sensation, vision loss, tremor, weakness, numbness or tingling, incoordination, imbalance, and incontinence of bowel and sexual dysfunction. Psychiatric: Denies anxiety, depression and hallucinations.   Eyes: Denies blurred vision, double vision and eye pain. ENMT: Denies difficulty swallowing, dental pain, hearing loss, and tinnitus. Cardiovascular: Denies chest pain and palpitations. Respiratory: Denies shortness of breath. Genitourinary: Denies urinary incontinence and retention. Integumentary: Denies rashes. Endocrine: Denies polydipsia and polyuria.     Past Medical History:   Diagnosis Date    CAD (coronary artery disease)     Cerebral artery occlusion with cerebral infarction ([address]     Hyperlipidemia     Hypertension     MI, old        Past Surgical History:   Procedure Laterality Date    CORONARY ANGIOPLASTY WITH STENT PLACEMENT      INSERTABLE CARDIAC MONITOR      reveal linq cardiac monitor    IR MECHANICAL ART THROMBECTOMY INTRACRANIAL  4/13/2021    IR MECHANICAL ART THROMBECTOMY INTRACRANIAL 4/13/2021 Janina Barroso MD [facility] SPECIAL PROCEDURES    OTHER SURGICAL HISTORY  07/12/2016    Dr. Jackson - [facility] [place] insertion       Current Medications   dextrose 50 % IV solution, Once PRN  acetaminophen (TYLENOL) tablet 650 mg, Q4H PRN  labetalol (NORMODYNE;TRANDATE) injection 5 mg, Q10 Min PRN  hydrALAZINE (APRESOLINE) injection 5 mg, Q10 Min PRN  oxyCODONE (ROXICODONE) immediate release tablet 2.5 mg, Q4H PRN  famotidine (PEPCID) injection 20 mg, BID  senna (SENOKOT) tablet 8.6 mg, Nightly  polyethylene glycol (GLYCOLAX) packet 17 g, Daily  atorvastatin (LIPITOR) tablet 80 mg, Daily  baclofen (LIORESAL) tablet 10 mg, TID  captopril (CAPOTEN) tablet 50 mg, TID  metoprolol tartrate (LOPRESSOR) tablet 50 mg, BID  sodium chloride flush 0.9 % injection 5-40 mL, 2 times per day  sodium chloride flush 0.9 % injection 5-40 mL, PRN  0.9 % sodium chloride infusion, PRN  ondansetron (ZOFRAN) injection 4 mg, Q6H PRN  polyethylene glycol (GLYCOLAX) packet 17 g, Daily PRN  acetaminophen (TYLENOL) tablet 650 mg, Q6H PRN    Or  acetaminophen (TYLENOL) suppository 650 mg, Q6H PRN  perflutren lipid microspheres (DEFINITY) injection 1.65 mg, ONCE PRN  magnesium sulfate 2000 mg in 50 mL IVPB premix, Once        Allergies  Allergies   Allergen Reactions    Ciprofloxacin-Ciproflox Hcl Er      \"veins were red and burned\"    Pcn [Penicillins] Hives        Family History  No family history on file. Social History     Tobacco History     Smoking Status  Current Every Day Smoker Smoking Frequency  0.25 packs/day Smoking Tobacco Type  Cigars, Cigarettes    Smokeless Tobacco Use  Never Used          Alcohol History     Alcohol Use Status  Yes Drinks/Week  1 Cans of beer per week Amount  1.0 standard drinks of alcohol/wk Comment  social- mainly summer occasional beer          Drug Use     Drug Use Status  No          Sexual Activity     Sexually Active  Yes Partners  Female                Physical Exam  Vitals:    04/13/21 1100 04/13/21 1107 04/13/21 1200 04/13/21 1207   BP: 127/85 121/75 138/81 138/81   Pulse: 104 105 108 97   Resp: 13 27 17 10   Temp:       TempSrc:       SpO2: 94%      Weight:           I/O last 3 completed shifts: In: 1000 [I.V.:1000]  Out: 1510 [Urine:1460; Blood:50]  No intake/output data recorded. Constitutional: Well developed, well nourished and in no acute distress. Respiratory: Clear to auscultation bilaterally with no use of accessory muscles during respiration. Cardiovascular:  No murmurs auscultated. Carotid arteries without bruits. Pedal pulses and radial pulses 2+ bilaterally. No edema in all four extremities. Mental Status:    Alert and oriented to person, place, and time. Recent and remote memory: Intact  Attention and concentration: Intact  Speech and language : Intact  Fund of knowledge: Intact  Judgement and Insight: Intact    Cranial Nerves:     II: Visual Fields: Full to confrontation bilaterally   III, IV, VI: Pupils: equally round and reactive to light, 3  to 2  mm bilaterally. EOMs: full with no nystagmus.    V: Sensation intact to light touch and pin prick sensation bilaterally  VII: symmetric and strong in the upper and lower face bilaterally  VIII: Hearing intact to finger rub bilaterally   IX,X: Palate elevates symmetrically. XI: head turn (sternocleidomastoid) and shoulder shrug (trapezius) 5/5 bilaterally   XII: Tongue is midline upon protrusion    Motor:   Normal tone and bulk. His right-sided weakness is back to baseline. He had pre-existing right-sided weakness from a stroke in 2018. Current Functional Status(Modified Iredell Scale):  0=No symptoms at all     Imaging  Xr Hip Left (2-3 Views)    Result Date: 4/13/2021  EXAMINATION: TWO XRAY VIEWS OF THE LEFT HIP 4/13/2021 9:48 am COMPARISON: Left femur dated May 20, 2018 HISTORY: ORDERING SYSTEM PROVIDED HISTORY: fall at home TECHNOLOGIST PROVIDED HISTORY: Reason for exam:->fall at home What reading provider will be dictating this exam?->CRC FINDINGS: Mild osteoarthritis at the left hip associated with acetabular spurring. No fracture or dislocation. Mild acetabular spurring indicating underlying osteoarthritis. Ct Head Wo Contrast    Result Date: 4/13/2021  EXAMINATION: CT OF THE HEAD WITHOUT CONTRAST  4/13/2021 1:40 am TECHNIQUE: CT of the head was performed without the administration of intravenous contrast. Dose modulation, iterative reconstruction, and/or weight based adjustment of the mA/kV was utilized to reduce the radiation dose to as low as reasonably achievable. COMPARISON: None. HISTORY: ORDERING SYSTEM PROVIDED HISTORY: stroke TECHNOLOGIST PROVIDED HISTORY: Has a \"code stroke\" or \"stroke alert\" been called? ->Yes Reason for exam:->stroke Decision Support Exception->Emergency Medical Condition (MA) FINDINGS: No intracranial hemorrhage, mass effect or midline shift. Basal cisterns are patent. Ventricles are not enlarged. Cortical volume loss. Areas of decreased attenuation in the bilateral white matter are nonspecific but likely due to chronic microvascular ischemia. Vascular calcifications.   Some The posterior cerebral arteries are unremarkable. 1.  Right M1 occlusion . 2. Estimated stenosis of the proximal right and left internal carotid artery by NASCET criteria is not hemodynamically significant     Ct Brain Perfusion    Addendum Date: 2021    Addendum: Comparison 2018 There appears to be progression when compared to previous    Result Date: 2021  Patient MRN: [id number] : 1950 Age:  66 years Gender: Male Order Date: 2021 Exam: CT BRAIN PERFUSION Number of Images: 333 views Indication:   stroke stroke Comparison: None. Findings: Perfusion images demonstrate symmetric blood volume Blood flow images demonstrate asymmetric blood flow There is significant ischemic penumbrar identified. There is no significant core infarct identified. Significant ischemic penumbra identified involving the right temporal lobe the right posterior frontal lobe and to lesser extent the right parietal lobe     Cta Head W Contrast    Addendum Date: 2021    Addendum: When compared to previous, there is a severe stenosis in the right M1 segment on the previous study this appears to have progressed to occlusion. Result Date: 2021  Patient MRN:  [id number] : 1950 Age: 66 years Gender: Male Order Date:  2021 EXAM: CTA NECK W CONTRAST, CTA HEAD W CONTRAST NUMBER OF IMAGES:  56 INDICATION:  stroke stroke COMPARISON: 2018 Technique: Low-dose CT  acquisition technique included one of following options; 1 . Automated exposure control, 2. Adjustment of MA and or KV according to patient's size or 3. Use of iterative reconstruction. Contiguous spiral images were obtained in the axial plane, following the administration of intravenous contrast using CT angiographic protocol. Sagittal and coronal images were reconstructed from the axial plane acquisition. Additional MIP reconstructions were presented to aid in the interpretation of this study.  Images were obtained from the skull base cranially. There is mild calcified plaque identified in the vessels compatible with atherosclerotic disease. There is aortic arch and great vessels demonstrate mild atherosclerotic disease. The right carotid is unremarkable. The left carotid is unremarkable. The right vertebral artery is unremarkable. The left vertebral artery is unremarkable. The basilar artery is unremarkable. The middle cerebral arteries are abnormal there is a right M1 occlusion The anterior cerebral arteries are unremarkable. The posterior cerebral arteries are unremarkable. 1.  Right M1 occlusion . 2. Estimated stenosis of the proximal right and left internal carotid artery by NASCET criteria is not hemodynamically significant     Ir Mechanical Art Thrombectomy Intracranial    Result Date: 4/13/2021  IR MECHANICAL ART THROMBECTOMY INTRACRANIAL PROCEDURE PERFORMED: Cerebral angiogram with mechanical thrombectomy of Right MCA thrombus COMPLETED DATE:  4/13/2021 4:19 AM CLINICAL HISTORY/PRE-PROCEDURE DIAGNOSIS: Acute ischemic stroke. Patient comes in with acute onset of symptoms around 11:30 PM on 4/12/2021. He had a history of right MCA stenosis and was deemed not to be a candidate for intervention at the time. CT perfusion showing salvageable penumbra POST-PROCEDURE DIAGNOSIS: Same COMPARISON: CT angiographic documentation done on same date ANESTHESIA: Conscious sedation and local anesthesia VESSELS CATHETERIZED: 1. Right Internal Carotid Artery 2. Right middle cerebral artery RADIATION EXPOSURE DATA:  598.3 mGy TOTAL FLUOROSCOPY TIME: 6 minutes and 28 seconds CONTRAST USED: 30 mL of Isovue-300 PROCEDURE: Following informed consent, the patient was brought to the angiography suite, both groins are prepped and draped in usual sterile manner. Vascular access was obtained in the right common femoral artery with a 8-Greenlandic sheath which was connected to continuous heparinized saline flush.   A 6-Greenlandic Neuron MAX guide catheter was prepped and with the support of a diagnostic catheter was brought into the aorta, double flushed and connected to continuous heparinized saline flush. The balloon guide catheter was then telescoped into the Right internal carotid artery. Fluoroscopic imaging performed at that time confirmed the presence of previously documented arterial occlusion. A microcatheter was prepped and with the support of a microwire was navigated across the arterial occlusion. A ACE 68 aspiration catheter was then deployed into the thrombus and allowed to integrate for approximately 5 to 7 minutes. The device was then retrieved with flow of contrast .  Continuous aspiration was performed during the retrieval process. Postretrieval digital subtraction images were obtained and showed revascularization. STROKE ACUTE TIME STAMPS: Preprocedure NIH stroke scale: 15 Vascular access time: 3:20 AM Time  First Pass: 3:27 AM Time of final recanalization: 3:27 AM Reperfusion grade : TICI 3 Time of vascular closure: 03:33 AM Postprocedure NH stroke scale:10 INTERPRETATION OF IMAGES: 1. Right internal carotid artery injection: Intracranially there is normal opacification of the intracranial portions of the right internal carotid artery. There is abrupt occlusion at the M1 segment of the right middle cerebral artery. There is normal opacification of bilateral anterior cerebral arteries noticed 2. Post thrombectomy right internal carotid artery injections: Post Thrombectomy, there is complete recanalization of the right middle cerebral artery and its branches. There is no residual stenosis seen compared to prior CTA 2018. 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 IMPRESSION/RECOMMENDATIONS: Right middle cerebral artery occlusion status post successful mechanical thrombectomy. There is no residual stenosis seen compared to prior CTA 2018 Postprocedure neurological and hemodynamic monitoring was recommended. Patients:  If you have questions regarding some of the verbiage in your report, please visit [email or web address] for a definition. If you have any other questions, please contact your physician.        Labs:    CBC:   Lab Results   Component Value Date    WBC 12.9 04/13/2021    RBC 4.48 04/13/2021    HGB 12.7 04/13/2021    HCT 40.3 04/13/2021    MCV 90.0 04/13/2021    MCH 28.3 04/13/2021    MCHC 31.5 04/13/2021    RDW 15.2 04/13/2021     04/13/2021    MPV 10.2 04/13/2021     CMP:    Lab Results   Component Value Date     04/13/2021    K 4.8 04/13/2021    K 4.4 04/13/2021     04/13/2021    CO2 24 04/13/2021    BUN 13 04/13/2021    CREATININE 0.8 04/13/2021    GFRAA >60 04/13/2021    LABGLOM >60 04/13/2021    GLUCOSE 151 04/13/2021    GLUCOSE 90 03/28/2012    PROT 6.2 04/13/2021    LABALBU 3.9 04/13/2021    CALCIUM 8.1 04/13/2021    BILITOT 0.2 04/13/2021    ALKPHOS 70 04/13/2021    AST 21 04/13/2021    ALT 22 04/13/2021     HgBA1c:    Lab Results   Component Value Date    LABA1C 5.3 04/13/2021     FLP:    Lab Results   Component Value Date    TRIG 55 04/13/2021    HDL 49 04/13/2021    LDLCALC 33 04/13/2021    LABVLDL 11 04/13/2021

## 2021-04-13 NOTE — PROGRESS NOTES
Retired  Occupation/Profession: retired  Prior living arrangements: [x] Home  [] [address] living  [] SNF [] Other  Lived with:  [] Alone  [x] Spouse  [] Family  [] Other  Lived with: Raquel Tapia phone: [phone]  Home:  1 story home  3 entry steps  Rails: 2   Bedroom: [x] 1st floor  [] 2nd floor    Bathroom:  [x] 1st floor  [] 2nd floor    Prior Functional Level: Mod I with ADLs (using AE as needed per pt report; wife assists on occasion); Assist with IADLs. UPWalker for ambulation. Driving: yes  Dominant hand: [x] Right  [] Left    Previous Home Equipment:  [x] Cane [] Grab bars [] Orthotic / prosthetic   [x] Shower chair [] Tub bench  [x] 3-in-1 Commode [] Long handle sponge   [] Oxygen [] Sock aide  [x] Wheelchair  [x] motorized wc/scooter  [] Wheelchair cushion   [] Crutches [] Long handle shoehorn  [] Reachers [] Toilet seat elevator [] Rollator  [x] Walker(wheeled)   [] Walker(standard) [] Mechanical lift    [] None of the above     Has patient had 2 or more falls in the past year or any fall with injury in the past year? [x] yes   [] no   []unknown    Has patient had major surgery during past 100 days prior to admission?    [x] yes   [] no Type/ Date: 4/13/21 IR thrombectomy    Surgical History:  Past Surgical History:   Procedure Laterality Date    CORONARY ANGIOPLASTY WITH STENT PLACEMENT      INSERTABLE CARDIAC MONITOR      reveal linq cardiac monitor    IR MECHANICAL ART THROMBECTOMY INTRACRANIAL  4/13/2021    IR MECHANICAL ART THROMBECTOMY INTRACRANIAL 4/13/2021 Uzma Oconnell MD [facility] PROCEDURES    OTHER SURGICAL HISTORY  07/12/2016    Dr. Grady Mcadams- Lula Acron insertion       Past Medical:  Past Medical History:   Diagnosis Date    CAD (coronary artery disease)     Cerebral artery occlusion with cerebral infarction ([facility] [address]     Hyperlipidemia     Hypertension     MI, old        Current Co-morbidities:  [] Alzheimer's   [] Dysphasia     [] Parkinsonism  [] Amputation   [] GERD  [] Peripheral artery disease   [] Anemia      [] Encephalopathy  [] Peripheral vascular disease  [] Anxiety   [] Gangrene   [] Pneumonia  [] Aphasia   [] Gout    [] Polyneuropathy  [] Asthma   [] Heart Failure (diastolic) [] Post-polio syndrome  [] Atrial fibrillation  [] Heart Failure (left-sided) [] Pseudomonas enteritis   [] Blind    [] Heart Failure (right-sided) [] Pulmonary embolism  [] Cellulitis     [] Heart Failure (systolic) [] Renal dialysis  [] Clostridium difficile  [] Hemiparesis   [] Renal failure  [] Congestive heart failure [] Hypertension   [] Rheumatoid arthritis  [] COPD   [x] Hypotension   [] Seizure disorder   [x] Coronary Artery Disease [] Hypothyroidism  [] Septicemia   [] Deaf    [x] Hyperlipidemia   [] Sleep apnea  [] Depression   [] Morbid obesity  [] Spinal cord injury  [] Diabetes   [] MRSA   [x] Stroke  [] Diabetic nephropathy  [x] Myocardial infarction  [] Tracheostomy  [] Diabetic neuropathy  [] Osteoarthritis  [] Traumatic brain injury   [] Diabetic retinopathy  [] Osteoporosis   [] Urinary tract infection  [] DVT    [] Pancytopenia  [] Vocal cord paralysis  []  Spinal stenosis   []  kidney disease [] VRE  [] Post op    []    []        Medical/Functional Conditions requiring inpatient rehabilitation: Stroke (monitor neuro changes), Hypertension ( monitor BP), CAD(monitor labs, vitals)    Risk for Medical/Clinical Complications: Falls, injury, pain, skin breakdown, abnormal vitals, abnormal labs, DVT, PE, pneumonia, decreased mobility, neuro changes     CLINICAL DATA:     Height : 6'1\"     Weight:  274 lb   BMI: 36.27       Date: 4/13/21 Date: 4/15/21 Date:    temperature 98.2 98    pulse 86 83    respirations 19 17    Blood pressure 138/89 126/65    Pulse oximeter 94%  96% room air       ALLERGIES: Ciprofloxacin-ciproflox hcl er and Pcn [penicillins]    DIET : DIET DYSPHAGIA SOFT AND BITE-SIZED;  Carb Control: 3 carb choices (45 gms)/meal; Low Sodium (2 GM)    Current Lab and Diagnostic Tests:   Recent Results (from the past 24 hour(s))   Basic metabolic panel    Collection Time: 04/15/21  7:18 AM   Result Value Ref Range    Sodium 135 132 - 146 mmol/L    Potassium 4.0 3.5 - 5.0 mmol/L    Chloride 102 98 - 107 mmol/L    CO2 27 22 - 29 mmol/L    Anion Gap 6 (L) 7 - 16 mmol/L    Glucose 98 74 - 99 mg/dL    BUN 10 8 - 23 mg/dL    CREATININE 0.8 0.7 - 1.2 mg/dL    GFR Non-African American >60 >=60 mL/min/1.73    GFR African American >60     Calcium 8.1 (L) 8.6 - 10.2 mg/dL   COVID-19, Rapid    Collection Time: 04/15/21 10:40 AM    Specimen: Nasopharyngeal Swab   Result Value Ref Range    SARS-CoV-2, NAAT Not Detected Not Detected     Ct Head Wo Contrast  Result Date: 4/13/2021  No acute intracranial findings. Of note, please see separate dictated report for CT angiogram of the head and neck and CT perfusion study. Volume loss and findings suggestive of chronic microvascular ischemia. Findings were discussed with Dr. Rodrigo Lipscomb at approximately [address] hours [facility] time on 04/13/2021. Cta Neck W Contrast  Addendum Date: 4/13/2021    Addendum: When compared to previous, there is a severe stenosis in the right M1 segment on the previous study this appears to have progressed to occlusion. 1.  Right M1 occlusion . 2. Estimated stenosis of the proximal right and left internal carotid artery by NASCET criteria is not hemodynamically significant     Ct Brain Perfusion  Addendum Date: 4/13/2021    Significant ischemic penumbra identified involving the right temporal lobe the right posterior frontal lobe and to lesser extent the right parietal lobe     Cta Head W Contrast  Result Date: 4/13/2021  1. Right M1 occlusion .  2. Estimated stenosis of the proximal right and left internal carotid artery by NASCET criteria is not hemodynamically significant       Additional labs or diagnostic studies needed before admission to rehabilitation unit:  none    Medications:   aspirin  81 mg Oral Daily    enoxaparin  40 mg Subcutaneous Daily    clopidogrel  75 mg Oral Daily    famotidine (PEPCID) injection  20 mg Intravenous BID    senna  1 tablet Oral Nightly    polyethylene glycol  17 g Oral Daily    atorvastatin  80 mg Oral Daily    baclofen  10 mg Oral TID    captopril  50 mg Oral TID    metoprolol tartrate  50 mg Oral BID    sodium chloride flush  5-40 mL Intravenous 2 times per day      sodium chloride       acetaminophen, sodium chloride flush, sodium chloride, [DISCONTINUED] promethazine **OR** ondansetron    SPECIAL PRECAUTIONS: [x] No current precautions  [] Cardiac  [] Renal [] Sternal [] Respiratory      [] Neurological           [] Hip  [] Spinal [] Seizure  [] Aspiration  [] Isolation precautions:    [] Contact   [] Respiratory   [] Protective     [] Droplet    [x] Weight Bearing precautions:         [] Non Weight Bearing :         [] Toe Touch Weight Bearing :        [] Partial Weight Bearing :         [x] Weight Bearing as Tolerated :         [x] Fall Risk:   [x] Recent history of falls [x] Falls risk level (Salgado Scale): high      [x] Bed Alarm    [] Do not leave alone in the bathroom    [x] Chair Alarm    [] Cognitive impairment      [] One to One supervision  [] Sitter / Tele sitter   [] Safety enclosure bed  [] Decreased balance     SPECIAL REHABILITATION NEEDS:   [x] IV Therapy: [x] PRN Adapter  [] Midline  [] PICC      [] Central Line    [] TPN       [] Oxygen: [] Trach [] Bi-PAP [] CPAP  [] Nasal cannula  [] Liters:      [] Wound Care:   [] Pressure ulcers(stage and location) -    [] Wound vac   [] Wound or incision care    [] Pain Management (level of pain, meds):    [] Incontinence Bladder [] Warren  Insertion date:    []Hemodialysis and  Frequency:   [] Incontinence Bowel    [x] Last bowel movement : 4/14/21    Substance use history: [x] Yes  [] No   [x] Tobacco  [x] Alcohol  [] Other     [] Ethnic  [] Cultural  [] Spiritual  [] Language [] Needs  [] Other than English  [] Hearing Impaired  [] Visually Impaired  [] Speaking Impaired  [] Blind  [] Special equipment:  [] Devices/Splints  [] Type   [] Brace   [] Type  [] Bariatric bed  [] Extra wide commode  [] Extra wide wheelchair [] Extra wide walker  [] Jose C walker  [] Jose C wheelchair  [] Transfer lift    [] Other equipment     FUNCTIONAL STATUS PT / Myesha / Jacky Vargas:  FIM / EVAL Discipline Initial: 4/13/21 Follow Up: 4/15/21 Current:    Eating OT Set up       Grooming OT Moderate Assist       Bathing OT Max Assist       Dressing Upper Extremity OT Moderate Assist       Dressing Lower Extremity OT Max Assist       Toileting OT Dependent       Toilet Transfers OT Moderate Assist       Tub/Shower Transfers OT nt     Homemaking OT nt     Bed Mobility PT Moderate Assist   Minimum assistance      Bed/Wheelchair Transfers PT Moderate Assist   Moderate Assist      Locomotion Walk / Wheelchair  Device:  Distance: PT  20 feet with ModA with WW      9 feet with up walker swivel wheels and 10 feet ww mod assist     Endurance PT      Expression SP Mild cognitive-linguistic deficits, Min- Mild dysarthria     Social Interaction SP      Problem Solving SP      Memory SP      Comprehension SP      Swallowing SP mild oral dysphagia      Bowel Management NSG      Bladder Management NSG        Comments on Functional Status:  Will benefit from 3 hours of acute rehab therapy daily to improve gait, transfers, ADLs, IADLs, speech, swallow    [x] Able to participate a minimum of 3 hours per day of therapy intervention    Required treatments/services: [x] Rehabilitation nursing [] Dietitian / nurtition                 [x] Case management  [] Respiratory Therapy      [x] Social work   [] Other     Required Therapy:  Therapy Hours per Day Days per Week Therapeutic Interventions Required   [x] Physical Therapy 1 5-7 Gait, transfers, Safety, strength, education, endurance    [x] Occupational Therapy 1 5-7 ADLs, IADLs, Safety, strength, education, endurance    [x] Speech Pathology 1 5-7 Speech, swallowing, cognition   [] Prosthetics / Orthotics       []         Anticipated Discharge Plan:   Anticipated DME Needs:  [x] Home     [] Commode   [] Alone    [] Wheelchair   [] Supervised    [] Walker   [x] Assist    [] Oxygen        [] Hospital Bed  [] Assisted Living    [] Ramp        [x] To Be Determined    Anticipated Home Health Services:  Anticipated Outpatient Services:  [] PT       [] PT  [] OT      [] OT  [] Speech     [] Speech  [] Nursing     [] Dialysis  [] Aide      [x] To Be Determined  [x] To Be Determined    Anticipated support group:  [] Amputation  [] Multiple Sclerosis  [x] Stroke  [] Brain Injury  [] Spinal cord injury  [] Other     Barriers to discharge: cognitive deficits    Discharge Support: [] Patient lives alone and does not have a caregiver available     [x] Patient has a caregiver available     [x] Discharge plan has been verified with patient's caregiver      [x] Caregiver is in agreement with the discharge plan     Expected functional status for safe discharge: Modified Independent     Patient/support person goals: go home    Expected length of stay: 10-14 days    Discussed expected length of stay and agreeable to IRF plan: [x] Yes   [] No    Impairment Group Category: 1.1    Etiological Diagnosis: CVA    Primary Rehabilitation Diagnosis: CVA    Electronically signed by Zoran Padilla RN on 4/15/2021 at 1:25 PM    Prescreen completed __________________________________ (signature of prescreener)    Date:   4/13/21 Time:  2565    JUSTIFICATION FOR ADMISSION TO ACUTE REHABILITATION:  Patient has suffered decline in functional abilities for gait, transfers, speech, swallowing, cognition,  ADL's and IADL's as well as endurance. Patient has functional deficits requiring intensive therapy across multiple disciplines in order to return home safely.  Patient will need physician oversight for respiratory issues, abnormal vital signs, nutritional and hydration status, safety issues, medications and therapy modalities. PT, OT and speech will work on deficits as noted in evaluations. Case management and social work will provide services for DME and management of a safe discharge home. RECOMMEND LEVEL OF CARE  Recommend inpatient rehabilitation: [x] Yes   [] No  If no indicate reason:  [] Functional level too high  [] Unmotivated  [] No insurance carrier approval [] Unlikely to return to community  [] No medical necessity  [] Patient or family chose other facility  [] Too medically complex  [] Inadequate discharge plan  [] Rehabilitation bed unavailable [] Functional level too low  [] patient or family refused ARU    If patient not accepted for IRF admission, recommended level of care:  [] [address]  [] [address]  [] [place]   [] Home Care  [] Other      [] LTAC       Physician Assigned:  [] Dr. Carmen Cuellar         [] Dr. Isiah Manzano              [x] Dr. Elan Olivares [] Dr. Emery Dalal  [] Dr. Darwin Chong (if not admitted within 48 hours of initial pre-screen)    Medical Update/Changes: Prescreen updated to reflect current data since initiated. Functional Update/Changes: Therapy notes updated on prescreen graph to reflect current status.      Reviewer Signature:_____________________________________    Date:  4/15/21 Time: 1330    PHYSICIAN ADMISSION DETERMINATION AND REVIEW UPDATE:     ____________________________________________________________________  ____________________________________________________________________  ____________________________________________________________________  ____________________________________________________________________  ____________________________________________________________________    Physician Signature:_____________________________________    Print Signature:_________________________________________    Date: 4/15/21    Time:  5248

## 2021-04-13 NOTE — CONSULTS
Coronary angioplasty with stent; other surgical history (07/12/2016); and Insertable Cardiac Monitor. Home Medications:   Prior to Admission medications    Medication Sig Start Date End Date Taking? Authorizing Provider   metoprolol tartrate (LOPRESSOR) 50 MG tablet Take 50 mg by mouth 2 times daily   Yes Historical Provider, MD   aspirin 81 MG tablet Take 81 mg by mouth every other day   Yes Historical Provider, MD   atorvastatin (LIPITOR) 80 MG tablet Take 80 mg by mouth daily. Yes Historical Provider, MD   clopidogrel (PLAVIX) 75 MG tablet Take 75 mg by mouth daily. Yes Historical Provider, MD   baclofen (LIORESAL) 10 MG tablet Take 1 tablet by mouth 3 times daily 5/23/18   Amol Burroughs MD   captopril (CAPOTEN) 50 MG tablet Take 50 mg by mouth 3 times daily    Historical Provider, MD       Allergies: Allergies   Allergen Reactions    Ciprofloxacin-Ciproflox Hcl Er      \"veins were red and burned\"    Pcn [Penicillins] Hives       Social History:  Social History     Socioeconomic History    Marital status:      Spouse name: Not on file    Number of children: Not on file    Years of education: Not on file    Highest education level: Not on file   Occupational History    Not on file   Social Needs    Financial resource strain: Not on file    Food insecurity     Worry: Not on file     Inability: Not on file    Transportation needs     Medical: Not on file     Non-medical: Not on file   Tobacco Use    Smoking status: Current Every Day Smoker     Packs/day: 0.25     Types: Cigars, Cigarettes    Smokeless tobacco: Never Used   Substance and Sexual Activity    Alcohol use:  Yes     Alcohol/week: 1.0 standard drinks     Types: 1 Cans of beer per week     Comment: social- mainly summer occasional beer    Drug use: No    Sexual activity: Yes     Partners: Female   Lifestyle    Physical activity     Days per week: Not on file     Minutes per session: Not on file    Stress: Not on file   Relationships    Social connections     Talks on phone: Not on file     Gets together: Not on file     Attends [employer] service: Not on file     Active member of club or organization: Not on file     Attends meetings of clubs or organizations: Not on file     Relationship status: Not on file    Intimate partner violence     Fear of current or ex partner: Not on file     Emotionally abused: Not on file     Physically abused: Not on file     Forced sexual activity: Not on file   Other Topics Concern    Not on file   Social History Narrative    Not on file       Family History:   No family history on file. VITAL SIGNS:   /77   Pulse 102   Temp 98.2 °F (36.8 °C) (Temporal)   Resp 18   Wt 274 lb 14.6 oz (124.7 kg)   SpO2 90%   BMI 36.27 kg/m²     PHYSICAL EXAM:    General appearance:  Comfortable. GCS:    4 - Opens eyes on own   6 - Follows simple motor commands  5 - Alert and oriented    Pupil size: Left 3 mm   Right 3 mm  Pupil reaction: Yes  Wiggles fingers: Left Yes Right Yes  Hand grasp:   Left normal    Right normal  Wiggles toes: Left Yes    Right Yes  Plantar flexion:  Left normal   Right normal    CONSTITUTIONAL: no acute distress, lying in hospital bed. NEUROLOGIC: PERRL, oriented x 4  CARDIOVASCULAR: S1 S2, regular rate, regular rhythm, no murmur/gallop/rub.  Monitor:NSR  PULMONARY: no rhonchi/rales/wheezes, no use of accessory muscles  RENAL: barriga to gravity, clear yellow urine  ABDOMEN: soft, nontender, nondistended, nontympanic, no masses, no organomegaly, normal bowel sounds   MUSCULOSKELETAL: moves all extremities purposefully, 5/5 strength   SKIN/EXTREMITIES: no rashes/ecchymosis, no edema/clubbing, warm/dry, good capillary refill     Assessment & Plan:       Neuro: acute stroke d/t right MCA thrombosis s/p thrombectomy and tPA, hx of previous stroke with tPA  Monitor neuro status  Neurology following  Repeat CT at 24 hours  No AC or AP for 24 hours    CV: HX of HTN, hyperlipidemia, CAD, MI  Monitor hemodynamics. BP goal less than 160   PRN hydralazine & labetalol. Statin. 2D Echo.   cardene stopped     Pulm: no acute issues  Monitor RR & SpO2. Oxygen PRN  Encourage cough and deep breathing     GI: No acute issues. Pepcid  Diet. Monitor bowel function. Renal:  No acute issues  Monitor BUN & Cr.   Monitor electrolytes & replace as needed. Monitor urine output. ID: no acute issues    Endocrine: No acute issues. Monitor BS. Keep below 180    MSK: left hip ecchymosis  Xray left hip  ROM. Turn & reposition. Heme: nosebleed right nare, resolved  Monitor CBC. Bowel regime: glycolax, senna Last BM PTA  Pain control/Sedation: Tylenol  DVT prophylaxis: SCDs. No Lovenox/heparin until ok with neurosurgery.    GI prophylaxis: Pepcid, diet  Ancillary consults: neurology, medicine  Family update: will update when available  Code status:  full    Disposition:  ICU      Electronically signed by Raf Braden CNP on 4/13/2021 at 9:33 AM

## 2021-04-13 NOTE — H&P
[address]                  [id number]                              HISTORY AND PHYSICAL    PATIENT NAME: Courtney Avila                   :        1950  MED REC NO:   [id number]                            ROOM:       5093  ACCOUNT NO:   [de-identified]                           ADMIT DATE: 2021  PROVIDER:     Montserrat Estrada MD    CHIEF COMPLAINT ON ADMISSION:  New cerebrovascular accident. HISTORY OF PRESENTING ILLNESS:  This is a 59-year-old who has had  multiple previous cerebrovascular accidents. Apparently, he was on the  toilet and collapsed. As he could not get off, it was noted his left  side was flaccid. Taken him to the emergency room where he was  immediately given tissue plasminogen activator and went for  thrombectomy. A large thrombus was removed from the right middle  cerebral artery, and he started getting return of function of his left  side. He has had previous cerebrovascular accidents in the left middle  cerebral artery affecting his speech and movement on the right side. He  is also known to have risk factors of hypertension, hyperlipidemia, and  nicotine abuse. PAST MEDICAL HISTORY:  Significant for coronary artery stent placements  and previous myocardial infarction. He has had two or three episodes of  transient ischemic attacks with previous infarction. It has always been  suspected that he has had stroke from embolic disease but has never been  proven as he has had previous cardiac monitor. SOCIAL HISTORY:  He has been a long-term smoker for 30 years where he  has cut down to five or six cigarettes a day. He says he has quit in  the last year or two. He does not drink alcohol. FAMILY HISTORY:  Noncontributory.     REVIEW OF SYSTEMS:  He is helped of with his activities of daily living  by his wife as he needs help sometimes to transfer from sitting to  standing positions and has difficulty ambulating. PHYSICAL EXAMINATION:  GENERAL:  Presently, he is in some distress due to back pain. VITAL SIGNS:  His pulse is 98, respiratory rate 16, blood pressure  140/97. LUNGS:  Grossly clear to auscultation. HEART:  S1, S2.  Slightly tachycardic. He has left side weakness, he is  unable to lift left arm and left leg except for about 2/5 strength,  although he has mobility; right side also, he is weak on movement. ABDOMEN:  Soft, nontender. IMPRESSION:  Right middle cerebral artery embolic stroke along with  hypertension, hyperlipidemia, cardiomyopathy. PLAN:  See orders.         Mitchell Mcgraw MD    D: 04/13/2021 7:14:46       T: 04/13/2021 10:19:40     MADINA/ARIES_LEE ANN_OTONIEL  Job#: [id number]     Doc#: [id number]    CC:

## 2021-04-13 NOTE — PROGRESS NOTES
SPEECH/LANGUAGE PATHOLOGY  CLINICAL ASSESSMENT OF SWALLOWING FUNCTION    PATIENT NAME:  Chelly Escobar      :  1950      TODAY'S DATE:  2021  ROOM:  3817/3817-A    SUMMARY OF EVALUATION     DYSPHAGIA DIAGNOSIS:  mild oral dysphagia (secondary to Pt complaints of xerostomia)      DIET RECOMMENDATIONS:  Dysphagia 3, Soft/advanced (Soft & Bite-sized) solids with  thin liquids    Advance as tolerated, once xerostomia is resolved. FEEDING RECOMMENDATIONS:     Assistance level:  Stand by assistance is needed during all oral intake      Compensatory strategies recommended: No strategies are recommended at this time    THERAPY RECOMMENDATIONS:      Dysphagia therapy is not recommended            PROCEDURE     Consistencies Administered During the Evaluation   Liquids: thin liquid   Solids:  pureed foods and soft solid foods      Method of Intake:   cup, spoon, straw  Fed by clinician      Position:   Seated, upright                  RESULTS     Oral Stage:         Dentition:  natural   and Delayed A-P transit due to: decreased ability for initiation    and reduced lingual strength       Pharyngeal Stage:      Delayed initiation of the pharyngeal swallow noted, however no overt s/s of penetration/ aspiration observed. If silent aspiration is suspected, a videofluoroscopic swallow studdy (MBSS) is recommended and requires a doctor order. The Speech Language Pathologist (SLP) completed education with the patient regarding results of evaluation. Explained that Speech Pathology intervention is not warranted  at this time   Prognosis for improvements is fair +     This plan will be re-evaluated and revised in 1 week  if warranted.     Patient stated goals: Agreed with above,   Treatment goals discussed with Patient   The Patient understand(s) the diagnosis, prognosis and plan of care       CPT code:  75879  bedside swallow eval      [x]The admitting diagnosis and active problem list, as

## 2021-04-13 NOTE — PROGRESS NOTES
NA    Patient education  Pt educated on safety    Patient response to education:   Pt verbalized understanding Pt demonstrated skill Pt requires further education in this area   yes yes yes     ASSESSMENT:    Comments:  RN reported pt was stable for session. Pt was in bed upon arrival, agreeable to initial evaluation. Throughout the session, pt was quick to move and required increase cueing for safety during transfers and ambulation. Pt required assistance with hips and trunk to reach EOB. In standing, pt demonstrated forward flexed posture with flexed knees and mild posterior lean. Pt completed shuffled steps with [employer] along EOB to chair. Increased assistance given to pt to stand from lower surface. With ambulation, pt exhibited difficulty with [employer] approximation and management, decreased L step length, and mild inattention to L foot placement. Pt limited by fatigue and reported SOB. Pt was left in chair with all needs met and call light in reach. All lines remained intact. Discussed assessment with RN. Assistance of two required due to acuity of medical condition, complex medical lines management as well as deconditioning. Treatment:  Patient practiced and was instructed in the following treatment:     Bed mobility training - pt given verbal and tactile cues to facilitate proper sequencing and safety during supine>sit as well as provided with physical assistance to complete task    Sitting EOB for >8 minutes for upright tolerance, postural awareness and BLE ROM   Transfer training - pt was given verbal and tactile cues to facilitate proper hand placement, technique and safety during sit to stand and stand to sit as well as provided with physical assistance to complete task.  Gait training- pt was given verbal and tactile cues to facilitate safety, balance, walker management/approximation during ambulation as well as provided with physical assistance to complete task. Pt's/ family goals   1.  Return home    Patient and or family understand(s) diagnosis, prognosis, and plan of care. yes    PLAN OF CARE:    Current Treatment Recommendations     [x] Strengthening     [] ROM   [x] Balance Training   [x] Endurance Training   [x] Transfer Training   [x] Gait Training   [x] Stair Training   [] Positioning   [x] Safety and Education Training   [x] Patient/Caregiver Education   [] HEP  [] Other     Frequency of treatments: 2-5x/week x 1-2 weeks. Time in  1345  Time out  1420    Total Treatment Time  30 minutes     Evaluation Time includes thorough review of current medical information, gathering information on past medical history/social history and prior level of function, completion of standardized testing/informal observation of tasks, assessment of data and education on plan of care and goals.     CPT codes:  [] Low Complexity PT evaluation 85210  [x] Moderate Complexity PT evaluation 27479   [] High Complexity PT evaluation 51551  [] PT Re-evaluation 87882  [] Gait training 61106 - minutes  [] Manual therapy 21245 - minutes  [x] Therapeutic activities 45718 30 minutes  [] Therapeutic exercises 03103 - minutes  [] Neuromuscular reeducation 78615 - minutes      [address] [facility], [place], [place]  [id number]

## 2021-04-13 NOTE — PROGRESS NOTES
NEUROINTERVENTION PROCEDURE NOTE    PATIENT NAME: Renu Weller  MRN: [id number]  : 1950  DATE OF PROCEDURE: 21    Stroke Metrics  NIHSS prior to procedure: 15  IV TPA Administered: [x] Yes  []  No  Consent obtained: [x] Yes  []  No  by Sahra Martin, wife/POA     Neurointerventionalist: Natasha Singleton  1st assistant Kwabena Tsai     Time Event Device Notes   0320 Groin puncture     0327 1st pass suction Reperfusion thgthrthathdtheth:th th4th 0333 Groin closure  Sheath pulled and  Angioseal used to close arterial puncture. Puncture site cleansed and dry dressing applied. No bleeding, swelling or complications noted, no change in pulses. IA tPA adminstered: [] Yes  [x]  No                                  Post-procedure NIHSS unable to assess due to anesthesia/sedation     0342  CT head completed in IR room    0420  Patient transported to [facility]  and handoff report given to Faith Stager.      Family updated: [x] Yes  []  No

## 2021-04-13 NOTE — PROGRESS NOTES
Neurointerventional POST Procedure Note      Date of Service: 21      Patient Name: Malinda Wheat   : 1950  Medical record number:  [id number]        Procedure: Right MCA stroke      Physician: Sylvia Owen MD  Assistant: Tosha Giordano  Access:Right Groin   Vessels injected: Right ICA  Hemostasis: 8F angioseal , Quickclot due to tpa   Anesthesia: Please see flowchart  Specimens: None  Blood loss: 100 ml    Contrast Material:  please see dictation in PACS  Fluoro time: Please see dictation in PACS      Diagnosis/Findings:   1. Acute Right MCA M1 Occlusion , long segment thrombus   2. Successful thrombectomy with TICI 3 re perfusion     Plan:  1. q1 Neurological checks for 24 hours post procedure  2. Maintain SBP <160 MAP always greater than 60 unless otherwise specified by us in updated note elsewhere. 3. Neurovascular checks   4. 24 hour CT head   5. STAT CT head for any neurological decline and contact me immediately    Patient had nose bleed during/post procedure- RHINOROCKET was applied to right nare  hemostasis was acheived    Regular post-TPA neuro checks to monitor for deterioration, as this could be a sign of cerebral edema or hemorrhage.  ** Please utilize Post TPA Acute Ischemic Stroke Admission Order Set **  No anti-platelets or anti-coagulants first 24hr post IV-rtPA minimum  SCDs for DVT prophylaxis   2D-Echo  Check fasting lipid panel - empiric high intensity statin therapy  Tight serum glucose control   Avoid hypotonic or glucose containing IVF's  Speech & Swallow eval and treat  PT/OT/IPR

## 2021-04-13 NOTE — ED PROVIDER NOTES
Department of Emergency Medicine   ED  Provider Note  Admit Date/RoomTime: 4/13/2021  1:20 AM  ED Room: 09/09 4/13/21  2:02 AM EDT    Stroke Alert called: Yes    HISTORY OF PRESENT ILLNESS:  (Nurses Notes Reviewed)    Chief Complaint:   Cerebrovascular Accident (Possible CVA, flaccid left side per wife, gaze to the left, hx CVA.)      Source of history provided by:  patient and EMS personnel. History/Exam Limitations: none. oRck Prather is a 66 y.o. old male presenting to the emergency department by EMS for possible stroke. Patient's last well-known is 1130. He was with his wife, and his left side went weak, he did not hit his head or lose consciousness, they caught him before he hit the ground. He does have history of previous stroke, he is on aspirin and Plavix. No residual neurological deficits. Patient denies any chest pain, shortness of breath, fever, chills, cough, sputum, change in bowel or bladder, neck pain, head pain, or any other symptoms or complaints. Code Status on file: Prior. NIH Stroke Scale at time of initial evaluation:          Past Medical History:  has a past medical history of CAD (coronary artery disease), Cerebral artery occlusion with cerebral infarction ([facility] [address] Hyperlipidemia, Hypertension, and MI, old. Past Surgical History:  has a past surgical history that includes Coronary angioplasty with stent; other surgical history (07/12/2016); and Insertable Cardiac Monitor. Social History:  reports that he has been smoking cigars and cigarettes. He has been smoking about 0.25 packs per day. He has never used smokeless tobacco. He reports current alcohol use of about 1.0 standard drinks of alcohol per week. He reports that he does not use drugs. Prior Functional Status(Modified Orlando Scale):  0=No symptoms at all    Family History: family history is not on file. The patients home medications have been reviewed.   Prior to Admission medications Medication Sig Start Date End Date Taking? Authorizing Provider   metoprolol tartrate (LOPRESSOR) 50 MG tablet Take 50 mg by mouth 2 times daily   Yes Historical Provider, MD   aspirin 81 MG tablet Take 81 mg by mouth every other day   Yes Historical Provider, MD   atorvastatin (LIPITOR) 80 MG tablet Take 80 mg by mouth daily. Yes Historical Provider, MD   clopidogrel (PLAVIX) 75 MG tablet Take 75 mg by mouth daily. Yes Historical Provider, MD   baclofen (LIORESAL) 10 MG tablet Take 1 tablet by mouth 3 times daily 5/23/18   Marge Rock MD   captopril (CAPOTEN) 50 MG tablet Take 50 mg by mouth 3 times daily    Historical Provider, MD       Allergies: Ciprofloxacin-ciproflox hcl er and Pcn [penicillins]       Review of Systems:   Pertinent positives and negatives are stated within HPI, all other systems reviewed and are negative.    ---------------------------------------------------PHYSICAL EXAM--------------------------------------    Constitutional/General: Alert and oriented x3, well appearing, non toxic in NAD  Head: Normocephalic and atraumatic  Eyes: PERRL, EOMI  Mouth: Oropharynx clear, handling secretions, no trismus. Neck: Supple, full ROM, non tender to palpation in the midline, no stridor, no crepitus, no meningeal signs  Pulmonary: Lungs clear to auscultation bilaterally, no wheezes, rales, or rhonchi. Not in respiratory distress  Cardiovascular:  Regular rate. Regular rhythm. No murmurs, gallops, or rubs. 2+ distal pulses  Chest: no chest wall tenderness  Abdomen: Soft. Non tender. Non distended. +BS. No rebound, guarding, or rigidity. No pulsatile masses appreciated. Musculoskeletal: Moves all extremities x 4. Warm and well perfused, no clubbing, cyanosis, or edema. Capillary refill <3 seconds  Skin: warm and dry. No rashes. Neurologic: Please also see NIH stroke scale.   Psych: Normal Affect    NIH Stroke Scale/Score at time of initial evaluation:  1A: Level of Consciousness 0 - alert; keenly responsive   1B: Ask Month and Age 0 - answers both questions correctly   1C: Tell Patient To Open and Close Eyes, then Hand  Squeeze 0 - performs both tasks correctly   2: Test Horizontal Extraocular Movements 0 - normal   3: Test Visual Fields 0 - no visual loss   4: Test Facial Palsy 2 - partial paralysis (total or near total paralysis of the lower face)   5A: Test Left Arm Motor Drift 4 - no movement   5B: Test Right Arm Motor Drift 0 - no drift, limb holds 90 (or 45) degrees for full 10 seconds   6A: Test Left Leg Motor Drift 4 - no movement   6B: Test Right Leg Motor Drift 0 - no drift; leg holds 30 degree position for full 5 seconds   7: Test Limb Ataxia   (FNF/Heel-Shin) 1 - present in one limb   8: Test Sensation 1 - mild to moderate sensory loss; patient feels pinprick is less sharp or is dull on the affected side; there is a loss of superficial pain with pinprick but patient is aware of being touched    9: Test Language/Aphasia 0 - no aphasia, normal   10: Test Dysarthria 1 - mild to moderate, patient slurs at least some words and at worst, can be understood with some difficulty   11: Test Extinction/Inattention 2 - profound nella-inattention or nella- inattention to more than one modality. Does not recognize own hand or orients only to one side of space    Total Score: 15   4/13/21 at 2:28 AM EDT. Acute CVA Core Measures:      - t-PA Eligibility: IV t-PA was Administered-Total dose IV tPA is 0.9 mg/kg (maximum 90 mg). Given 10% over one minute, then remaining 90% given as infusion over 60 minutes.            -------------------------------------------------- RESULTS -------------------------------------------------  All laboratory and imaging studies have been reviewed by myself    LABS:  Results for orders placed or performed during the hospital encounter of 04/13/21   APTT   Result Value Ref Range    aPTT 33.7 24.5 - 35.1 sec   Protime-INR   Result Value Ref Range    Protime 11.3 9.3 - 12.4 sec    INR 1.0    CBC auto differential   Result Value Ref Range    WBC 12.9 (H) 4.5 - 11.5 E9/L    RBC 4.48 3.80 - 5.80 E12/L    Hemoglobin 12.7 12.5 - 16.5 g/dL    Hematocrit 40.3 37.0 - 54.0 %    MCV 90.0 80.0 - 99.9 fL    MCH 28.3 26.0 - 35.0 pg    MCHC 31.5 (L) 32.0 - 34.5 %    RDW 15.2 (H) 11.5 - 15.0 fL    Platelets 246 745 - 019 E9/L    MPV 10.2 7.0 - 12.0 fL    Neutrophils % 67.3 43.0 - 80.0 %    Immature Granulocytes % 0.3 0.0 - 5.0 %    Lymphocytes % 23.7 20.0 - 42.0 %    Monocytes % 6.3 2.0 - 12.0 %    Eosinophils % 2.0 0.0 - 6.0 %    Basophils % 0.4 0.0 - 2.0 %    Neutrophils Absolute 8.66 (H) 1.80 - 7.30 E9/L    Immature Granulocytes # 0.04 E9/L    Lymphocytes Absolute 3.05 1.50 - 4.00 E9/L    Monocytes Absolute 0.81 0.10 - 0.95 E9/L    Eosinophils Absolute 0.26 0.05 - 0.50 E9/L    Basophils Absolute 0.05 0.00 - 0.20 E9/L   Comprehensive Metabolic Panel w/ Reflex to MG   Result Value Ref Range    Sodium 137 132 - 146 mmol/L    Potassium reflex Magnesium 4.4 3.5 - 5.0 mmol/L    Chloride 101 98 - 107 mmol/L    CO2 24 22 - 29 mmol/L    Anion Gap 12 7 - 16 mmol/L    Glucose 127 (H) 74 - 99 mg/dL    BUN 13 8 - 23 mg/dL    CREATININE 0.9 0.7 - 1.2 mg/dL    GFR Non-African American >60 >=60 mL/min/1.73    GFR African American >60     Calcium 8.8 8.6 - 10.2 mg/dL    Total Protein 6.2 (L) 6.4 - 8.3 g/dL    Albumin 3.9 3.5 - 5.2 g/dL    Total Bilirubin 0.2 0.0 - 1.2 mg/dL    Alkaline Phosphatase 70 40 - 129 U/L    ALT 22 0 - 40 U/L    AST 21 0 - 39 U/L   LACTIC ACID, PLASMA   Result Value Ref Range    Lactic Acid 2.1 0.5 - 2.2 mmol/L   POCT Glucose   Result Value Ref Range    Meter Glucose 121 (H) 74 - 99 mg/dL       RADIOLOGY:  Interpreted by Radiologist.  CTA NECK W CONTRAST   Final Result   Addendum 1 of 1   Addendum:   When compared to previous, there is a severe stenosis in the right M1   segment on the previous study this appears to have progressed to   occlusion.       Final      CTA HEAD W CONTRAST   Final Result   Addendum 1 of 1   Addendum:   When compared to previous, there is a severe stenosis in the right M1   segment on the previous study this appears to have progressed to   occlusion. Final      CT BRAIN PERFUSION   Final Result   Addendum 1 of 1   Addendum:   Comparison 5/19/2018   There appears to be progression when compared to previous      Final      CT HEAD WO CONTRAST   Final Result   No acute intracranial findings. Of note, please see separate dictated report   for CT angiogram of the head and neck and CT perfusion study. Volume loss and findings suggestive of chronic microvascular ischemia. Findings were discussed with Dr. Kraig Lanza at approximately [address] hours [place]   time on 04/13/2021. IR Interventional Radiology Procedure Request    (Results Pending)       EKG Interpretation  Interpreted by emergency department physician.         ------------------------- NURSING NOTES AND VITALS REVIEWED ---------------------------   The nursing notes within the ED encounter and vital signs as below have been reviewed. BP (!) 146/103   Pulse 88   Temp 96.8 °F (36 °C) (Temporal)   Resp 16   Wt 274 lb 0.3 oz (124.3 kg)   SpO2 98%   BMI 36.15 kg/m²   Oxygen Saturation Interpretation: Normal    The patients available past medical records and past encounters were reviewed.         ------------------------------ ED COURSE/MEDICAL DECISION MAKING----------------------  Medications   alteplase (ACTIVASE) 100 MG injection (has no administration in time range)   sodium chloride flush 0.9 % injection 5-40 mL (has no administration in time range)   sodium chloride flush 0.9 % injection 5-40 mL (has no administration in time range)   0.9 % sodium chloride infusion (has no administration in time range)   alteplase (ACTIVASE) injection 9 mg (9 mg Intravenous New Bag 4/13/21 0206)     Followed by   alteplase (ACTIVASE) injection 81 mg (81 mg Intravenous New Bag 4/13/21 0207)

## 2021-04-13 NOTE — ANESTHESIA PRE PROCEDURE
Department of Anesthesiology  Preprocedure Note       Name:  Yuniel Millan   Age:  66 y.o.  :  1950                                          MRN:  [id number]         Date:  2021      Surgeon: * No surgeons listed *    Procedure: * No procedures listed *    Medications prior to admission:   Prior to Admission medications    Medication Sig Start Date End Date Taking? Authorizing Provider   metoprolol tartrate (LOPRESSOR) 50 MG tablet Take 50 mg by mouth 2 times daily   Yes Historical Provider, MD   aspirin 81 MG tablet Take 81 mg by mouth every other day   Yes Historical Provider, MD   atorvastatin (LIPITOR) 80 MG tablet Take 80 mg by mouth daily. Yes Historical Provider, MD   clopidogrel (PLAVIX) 75 MG tablet Take 75 mg by mouth daily.      Yes Historical Provider, MD   baclofen (LIORESAL) 10 MG tablet Take 1 tablet by mouth 3 times daily 18   Saad Domínguez MD   captopril (CAPOTEN) 50 MG tablet Take 50 mg by mouth 3 times daily    Historical Provider, MD       Current medications:    Current Facility-Administered Medications   Medication Dose Route Frequency Provider Last Rate Last Admin    alteplase (ACTIVASE) 100 MG injection             sodium chloride flush 0.9 % injection 5-40 mL  5-40 mL Intravenous 2 times per day Paula Gooden MD        sodium chloride flush 0.9 % injection 5-40 mL  5-40 mL Intravenous PRN Paula Gooden MD        0.9 % sodium chloride infusion  25 mL Intravenous PRN Paula Gooden MD        alteplase (ACTIVASE) injection 81 mg  81 mg Intravenous Once Paula Gooden MD 81 mL/hr at 21 0207 81 mg at 21 0207    Followed by   [employer] 0.9 % sodium chloride bolus  50 mL Intravenous Once Paula Gooden MD        dextrose 50 % IV solution  12.5 g Intravenous Once PRN Paula Gooden MD         Current Outpatient Medications   Medication Sig Dispense Refill    metoprolol tartrate (LOPRESSOR) 50 MG tablet Take 50 mg by mouth 2 times daily      aspirin 81 MG tablet Take 81 mg by mouth every other day      atorvastatin (LIPITOR) 80 MG tablet Take 80 mg by mouth daily.  clopidogrel (PLAVIX) 75 MG tablet Take 75 mg by mouth daily.  baclofen (LIORESAL) 10 MG tablet Take 1 tablet by mouth 3 times daily      captopril (CAPOTEN) 50 MG tablet Take 50 mg by mouth 3 times daily         Allergies: Allergies   Allergen Reactions    Ciprofloxacin-Ciproflox Hcl Er      \"veins were red and burned\"    Pcn [Penicillins] Hives       Problem List:    Patient Active Problem List   Diagnosis Code    Cerebrovascular accident (CVA) due to occlusion of right middle cerebral artery ([facility] [address] I63.511    Coronary artery disease involving native coronary artery of native heart without angina pectoris I25.10    History of myocardial infarction I25.2    Essential hypertension I10    Mixed hyperlipidemia E78.2    Status post placement of implantable loop recorder Z95.818    Acute ischemic stroke ([facility] [address] I63.9    History of CVA (cerebrovascular accident) without residual deficits Z86.73    History of TIA (transient ischemic attack) Z86.73    Acute CVA (cerebrovascular accident) ([facility] [address] I63.9       Past Medical History:        Diagnosis Date    CAD (coronary artery disease)     Cerebral artery occlusion with cerebral infarction ([facility] [address]     Hyperlipidemia     Hypertension     MI, old        Past Surgical History:        Procedure Laterality Date    CORONARY ANGIOPLASTY WITH STENT PLACEMENT      INSERTABLE CARDIAC MONITOR      reveal linq cardiac monitor    OTHER SURGICAL HISTORY  07/12/2016    Dr. Roberth Hazel- LINQ insertion       Social History:    Social History     Tobacco Use    Smoking status: Current Every Day Smoker     Packs/day: 0.25     Types: Cigars, Cigarettes    Smokeless tobacco: Never Used   Substance Use Topics    Alcohol use:  Yes     Alcohol/week: 1.0 standard drinks     Types: 1 Cans of beer per week     Comment: social- mainly summer occasional beer Ready to quit: Not Answered  Counseling given: Not Answered      Vital Signs (Current):   Vitals:    04/13/21 0128 04/13/21 0157 04/13/21 0159 04/13/21 0206   BP: (!) 171/98 (!) 158/94  (!) 146/103   Pulse: 83 88     Resp: 20 16     Temp: 96.8 °F (36 °C)      TempSrc: Temporal      SpO2: 93% 98%     Weight:   274 lb 0.3 oz (124.3 kg)                                               BP Readings from Last 3 Encounters:   04/13/21 (!) 146/103   05/23/18 (!) 154/82   05/14/18 120/80       NPO Status:                                                                                 BMI:   Wt Readings from Last 3 Encounters:   04/13/21 274 lb 0.3 oz (124.3 kg)   05/23/18 263 lb 6 oz (119.5 kg)   05/14/18 264 lb 6.4 oz (119.9 kg)     Body mass index is 36.15 kg/m². CBC:   Lab Results   Component Value Date    WBC 12.9 04/13/2021    RBC 4.48 04/13/2021    HGB 12.7 04/13/2021    HCT 40.3 04/13/2021    MCV 90.0 04/13/2021    RDW 15.2 04/13/2021     04/13/2021       CMP:   Lab Results   Component Value Date     04/13/2021    K 4.4 04/13/2021     04/13/2021    CO2 24 04/13/2021    BUN 13 04/13/2021    CREATININE 0.9 04/13/2021    GFRAA >60 04/13/2021    LABGLOM >60 04/13/2021    GLUCOSE 127 04/13/2021    GLUCOSE 90 03/28/2012    PROT 6.2 04/13/2021    CALCIUM 8.8 04/13/2021    BILITOT 0.2 04/13/2021    ALKPHOS 70 04/13/2021    AST 21 04/13/2021    ALT 22 04/13/2021       POC Tests: No results for input(s): POCGLU, POCNA, POCK, POCCL, POCBUN, POCHEMO, POCHCT in the last 72 hours.     Coags:   Lab Results   Component Value Date    PROTIME 11.3 04/13/2021    INR 1.0 04/13/2021    APTT 33.7 04/13/2021       HCG (If Applicable): No results found for: PREGTESTUR, PREGSERUM, HCG, HCGQUANT     ABGs: No results found for: PHART, PO2ART, VPO1UAH, APS2BAI, BEART, C5REKKPP     Type & Screen (If Applicable):  No results found for: LABABO, LABRH    Drug/Infectious Status (If Applicable):  No results found for: HIV, HEPCAB    COVID-19 Screening (If Applicable): No results found for: COVID19        Anesthesia Evaluation  Patient summary reviewed and Nursing notes reviewed no history of anesthetic complications:   Airway: Mallampati: III  TM distance: >3 FB   Neck ROM: full  Mouth opening: > = 3 FB Dental: normal exam         Pulmonary:   (+) COPD:  decreased breath sounds,                            ROS comment: Uses inhalers regularly    Oxygen prn   Cardiovascular:    (+) hypertension:, past MI:, CAD:,         Rhythm: regular  Rate: normal                 ROS comment: Mi 12 years ago     Neuro/Psych:   (+) CVA:,              ROS comment: Acute CVA with left hemiparesis, facial droop and rightward gaze deviation    Awake, alert and understanding/  Answers appropriately    Previous stroke with left sided weakness. Uses walker GI/Hepatic/Renal:   (+) GERD:, morbid obesity         ROS comment: C/o nausea . Endo/Other: Negative Endo/Other ROS                    Abdominal:           Vascular:                                        Anesthesia Plan      general     ASA 4 - emergent       Induction: intravenous. arterial line  MIPS: Postoperative opioids intended, Prophylactic antiemetics administered and Postoperative trial extubation. Anesthetic plan and risks discussed with patient and spouse. Use of blood products discussed with patient whom. Bebe Vaughan MD   4/13/2021      DOS STAFF ADDENDUM:    Patient seen and chart reviewed. Physical exam and history updated as indicated. NPO status confirmed. Anesthesia options and plan discussed including risks benefits with patient/legal guardian and family as available. Concerns and questions addressed. Consent verbalized to proceed.   Anesthesia plan, options and intraoperative/postoperative concerns discussed with care team.    Bebe Vaughan MD  4/13/2021  2:46 AM

## 2021-04-13 NOTE — PLAN OF CARE
Problem: Skin Integrity:  Goal: Will show no infection signs and symptoms  Outcome: Met This Shift  Goal: Absence of new skin breakdown  Outcome: Met This Shift     Problem: Falls - Risk of:  Goal: Will remain free from falls  Outcome: Met This Shift  Goal: Absence of physical injury  Outcome: Met This Shift

## 2021-04-13 NOTE — ANESTHESIA PROCEDURE NOTES
Arterial Line:    An arterial line was placed using surface landmarks, in the OR for the following indication(s): continuous blood pressure monitoring and blood sampling needed. A 20 gauge (size), 1 and 3/4 inch (length), Arrow (type) catheter was placed, Seldinger technique not used, into the left radial artery, secured by tape and Tegaderm. Anesthesia type: Local and General    Events:  patient tolerated procedure well with no complications.   Anesthesiologist: Nubia Villarreal MD  Performed: Anesthesiologist   Preanesthetic Checklist  Completed: patient identified, IV checked, site marked, risks and benefits discussed, surgical consent, monitors and equipment checked, pre-op evaluation, timeout performed, anesthesia consent given, oxygen available and patient being monitored

## 2021-04-14 ENCOUNTER — APPOINTMENT (OUTPATIENT)
Dept: CT IMAGING | Age: 71
DRG: 024 | End: 2021-04-14
Payer: MEDICARE

## 2021-04-14 LAB
ALBUMIN SERPL-MCNC: 3.5 G/DL (ref 3.5–5.2)
ALP BLD-CCNC: 59 U/L (ref 40–129)
ALT SERPL-CCNC: 17 U/L (ref 0–40)
ANION GAP SERPL CALCULATED.3IONS-SCNC: 8 MMOL/L (ref 7–16)
ANISOCYTOSIS: ABNORMAL
AST SERPL-CCNC: 22 U/L (ref 0–39)
BASOPHILS ABSOLUTE: 0.02 E9/L (ref 0–0.2)
BASOPHILS RELATIVE PERCENT: 0.2 % (ref 0–2)
BILIRUB SERPL-MCNC: 0.4 MG/DL (ref 0–1.2)
BUN BLDV-MCNC: 11 MG/DL (ref 8–23)
BURR CELLS: ABNORMAL
CALCIUM SERPL-MCNC: 8.2 MG/DL (ref 8.6–10.2)
CHLORIDE BLD-SCNC: 102 MMOL/L (ref 98–107)
CO2: 25 MMOL/L (ref 22–29)
CREAT SERPL-MCNC: 0.9 MG/DL (ref 0.7–1.2)
EOSINOPHILS ABSOLUTE: 0.02 E9/L (ref 0.05–0.5)
EOSINOPHILS RELATIVE PERCENT: 0.2 % (ref 0–6)
GFR AFRICAN AMERICAN: >60
GFR NON-AFRICAN AMERICAN: >60 ML/MIN/1.73
GLUCOSE BLD-MCNC: 107 MG/DL (ref 74–99)
HCT VFR BLD CALC: 30.1 % (ref 37–54)
HEMOGLOBIN: 9.7 G/DL (ref 12.5–16.5)
IMMATURE GRANULOCYTES #: 0.04 E9/L
IMMATURE GRANULOCYTES %: 0.4 % (ref 0–5)
LYMPHOCYTES ABSOLUTE: 2.36 E9/L (ref 1.5–4)
LYMPHOCYTES RELATIVE PERCENT: 21.9 % (ref 20–42)
MAGNESIUM: 2.2 MG/DL (ref 1.6–2.6)
MCH RBC QN AUTO: 29 PG (ref 26–35)
MCHC RBC AUTO-ENTMCNC: 32.2 % (ref 32–34.5)
MCV RBC AUTO: 89.9 FL (ref 80–99.9)
MONOCYTES ABSOLUTE: 0.81 E9/L (ref 0.1–0.95)
MONOCYTES RELATIVE PERCENT: 7.5 % (ref 2–12)
NEUTROPHILS ABSOLUTE: 7.51 E9/L (ref 1.8–7.3)
NEUTROPHILS RELATIVE PERCENT: 69.8 % (ref 43–80)
OVALOCYTES: ABNORMAL
PDW BLD-RTO: 15.3 FL (ref 11.5–15)
PHOSPHORUS: 3.2 MG/DL (ref 2.5–4.5)
PLATELET # BLD: 151 E9/L (ref 130–450)
PMV BLD AUTO: 10.7 FL (ref 7–12)
POIKILOCYTES: ABNORMAL
POLYCHROMASIA: ABNORMAL
POTASSIUM REFLEX MAGNESIUM: 4.4 MMOL/L (ref 3.5–5)
POTASSIUM SERPL-SCNC: 4.4 MMOL/L (ref 3.5–5)
RBC # BLD: 3.35 E12/L (ref 3.8–5.8)
REASON FOR REJECTION: NORMAL
REJECTED TEST: NORMAL
SODIUM BLD-SCNC: 135 MMOL/L (ref 132–146)
TOTAL PROTEIN: 5.5 G/DL (ref 6.4–8.3)
WBC # BLD: 10.8 E9/L (ref 4.5–11.5)

## 2021-04-14 PROCEDURE — 6370000000 HC RX 637 (ALT 250 FOR IP): Performed by: PSYCHIATRY & NEUROLOGY

## 2021-04-14 PROCEDURE — 6370000000 HC RX 637 (ALT 250 FOR IP): Performed by: CLINICAL NURSE SPECIALIST

## 2021-04-14 PROCEDURE — 6360000002 HC RX W HCPCS: Performed by: PSYCHIATRY & NEUROLOGY

## 2021-04-14 PROCEDURE — 70450 CT HEAD/BRAIN W/O DYE: CPT

## 2021-04-14 PROCEDURE — 80048 BASIC METABOLIC PNL TOTAL CA: CPT

## 2021-04-14 PROCEDURE — 6370000000 HC RX 637 (ALT 250 FOR IP): Performed by: INTERNAL MEDICINE

## 2021-04-14 PROCEDURE — 84100 ASSAY OF PHOSPHORUS: CPT

## 2021-04-14 PROCEDURE — 2060000000 HC ICU INTERMEDIATE R&B

## 2021-04-14 PROCEDURE — 85025 COMPLETE CBC W/AUTO DIFF WBC: CPT

## 2021-04-14 PROCEDURE — 6360000002 HC RX W HCPCS: Performed by: INTERNAL MEDICINE

## 2021-04-14 PROCEDURE — 2580000003 HC RX 258: Performed by: CLINICAL NURSE SPECIALIST

## 2021-04-14 PROCEDURE — 36415 COLL VENOUS BLD VENIPUNCTURE: CPT

## 2021-04-14 PROCEDURE — 2500000003 HC RX 250 WO HCPCS: Performed by: CLINICAL NURSE SPECIALIST

## 2021-04-14 PROCEDURE — 83735 ASSAY OF MAGNESIUM: CPT

## 2021-04-14 PROCEDURE — 2580000003 HC RX 258: Performed by: INTERNAL MEDICINE

## 2021-04-14 PROCEDURE — 80053 COMPREHEN METABOLIC PANEL: CPT

## 2021-04-14 PROCEDURE — 99232 SBSQ HOSP IP/OBS MODERATE 35: CPT | Performed by: PSYCHIATRY & NEUROLOGY

## 2021-04-14 RX ORDER — LABETALOL HYDROCHLORIDE 5 MG/ML
5 INJECTION, SOLUTION INTRAVENOUS EVERY 10 MIN PRN
Status: DISCONTINUED | OUTPATIENT
Start: 2021-04-14 | End: 2021-04-15

## 2021-04-14 RX ORDER — HYDRALAZINE HYDROCHLORIDE 20 MG/ML
5 INJECTION INTRAMUSCULAR; INTRAVENOUS EVERY 6 HOURS PRN
Status: DISCONTINUED | OUTPATIENT
Start: 2021-04-14 | End: 2021-04-15

## 2021-04-14 RX ORDER — CLOPIDOGREL BISULFATE 75 MG/1
75 TABLET ORAL DAILY
Status: DISCONTINUED | OUTPATIENT
Start: 2021-04-14 | End: 2021-04-15 | Stop reason: HOSPADM

## 2021-04-14 RX ORDER — CLOPIDOGREL BISULFATE 75 MG/1
300 TABLET ORAL ONCE
Status: DISCONTINUED | OUTPATIENT
Start: 2021-04-14 | End: 2021-04-14

## 2021-04-14 RX ORDER — ASPIRIN 81 MG/1
81 TABLET, CHEWABLE ORAL DAILY
Status: DISCONTINUED | OUTPATIENT
Start: 2021-04-14 | End: 2021-04-15 | Stop reason: HOSPADM

## 2021-04-14 RX ADMIN — BACLOFEN 10 MG: 10 TABLET ORAL at 14:22

## 2021-04-14 RX ADMIN — Medication 10 ML: at 20:46

## 2021-04-14 RX ADMIN — ONDANSETRON 4 MG: 2 INJECTION INTRAMUSCULAR; INTRAVENOUS at 02:44

## 2021-04-14 RX ADMIN — ENOXAPARIN SODIUM 40 MG: 40 INJECTION SUBCUTANEOUS at 08:32

## 2021-04-14 RX ADMIN — FAMOTIDINE 20 MG: 10 INJECTION INTRAVENOUS at 20:46

## 2021-04-14 RX ADMIN — ASPIRIN 81 MG CHEWABLE TABLET 81 MG: 81 TABLET CHEWABLE at 08:32

## 2021-04-14 RX ADMIN — CAPTOPRIL 50 MG: 12.5 TABLET ORAL at 20:45

## 2021-04-14 RX ADMIN — BACLOFEN 10 MG: 10 TABLET ORAL at 08:32

## 2021-04-14 RX ADMIN — POLYETHYLENE GLYCOL 3350 17 G: 17 POWDER, FOR SOLUTION ORAL at 08:31

## 2021-04-14 RX ADMIN — OXYCODONE HYDROCHLORIDE 2.5 MG: 5 TABLET ORAL at 22:13

## 2021-04-14 RX ADMIN — Medication 20 ML: at 08:33

## 2021-04-14 RX ADMIN — CAPTOPRIL 50 MG: 12.5 TABLET ORAL at 08:31

## 2021-04-14 RX ADMIN — BACLOFEN 10 MG: 10 TABLET ORAL at 20:46

## 2021-04-14 RX ADMIN — CLOPIDOGREL 75 MG: 75 TABLET, FILM COATED ORAL at 08:37

## 2021-04-14 RX ADMIN — FAMOTIDINE 20 MG: 10 INJECTION INTRAVENOUS at 08:31

## 2021-04-14 RX ADMIN — CAPTOPRIL 50 MG: 12.5 TABLET ORAL at 14:22

## 2021-04-14 RX ADMIN — METOPROLOL TARTRATE 50 MG: 50 TABLET, FILM COATED ORAL at 08:33

## 2021-04-14 RX ADMIN — METOPROLOL TARTRATE 50 MG: 50 TABLET, FILM COATED ORAL at 20:45

## 2021-04-14 RX ADMIN — ATORVASTATIN CALCIUM 80 MG: 80 TABLET, FILM COATED ORAL at 08:32

## 2021-04-14 ASSESSMENT — PAIN SCALES - GENERAL
PAINLEVEL_OUTOF10: 0
PAINLEVEL_OUTOF10: 4
PAINLEVEL_OUTOF10: 0

## 2021-04-14 ASSESSMENT — PAIN - FUNCTIONAL ASSESSMENT: PAIN_FUNCTIONAL_ASSESSMENT: PREVENTS OR INTERFERES SOME ACTIVE ACTIVITIES AND ADLS

## 2021-04-14 ASSESSMENT — PAIN DESCRIPTION - PROGRESSION
CLINICAL_PROGRESSION: NOT CHANGED

## 2021-04-14 ASSESSMENT — PAIN DESCRIPTION - DESCRIPTORS: DESCRIPTORS: ACHING;CONSTANT;DISCOMFORT

## 2021-04-14 ASSESSMENT — PAIN DESCRIPTION - LOCATION: LOCATION: BACK;HIP

## 2021-04-14 ASSESSMENT — PAIN DESCRIPTION - FREQUENCY: FREQUENCY: CONTINUOUS

## 2021-04-14 ASSESSMENT — PAIN DESCRIPTION - ORIENTATION: ORIENTATION: LEFT

## 2021-04-14 NOTE — ANESTHESIA POSTPROCEDURE EVALUATION
Department of Anesthesiology  Postprocedure Note    Patient: Melba Bustamante  MRN: [id number]  YOB: 1950  Date of evaluation: 4/14/2021  Time:  2:11 PM     Procedure Summary     Date: 04/13/21 Room / Location: [address] [place] [address] Procedures    Anesthesia Start: 0256 Anesthesia Stop: 9744    Procedure: IR MECHANICAL ART THROMBECTOMY INTRACRANIAL Diagnosis: (Rmca stroke)    Scheduled Providers:  Responsible Provider: Vera Cornell MD    Anesthesia Type: general ASA Status: 4 - Emergent          Anesthesia Type: general    Daryl Phase I:      Daryl Phase II:      Last vitals: Reviewed and per EMR flowsheets.        Anesthesia Post Evaluation    Patient location during evaluation: ICU  Patient participation: complete - patient participated  Level of consciousness: awake and alert  Airway patency: patent  Nausea & Vomiting: no nausea and no vomiting  Complications: no  Cardiovascular status: hemodynamically stable and blood pressure returned to baseline  Respiratory status: acceptable  Hydration status: euvolemic

## 2021-04-14 NOTE — PROGRESS NOTES
Met with pt to discuss ARU. He is agreeable. Discussed with Dr. Mele Velasquez and will accept to ARU pending completion of testing, medical stability, negative covid test and pre-cert. Will initiate pre-cert today. Patient has been discharged from court to [facility] and will follow up with outpatient care with [facility] with walk in intake hours: Monday through Thursday from 8:15am to 11:15am and 2:15pm to 4:15pm. Patient has been provided with information regarding mental health follow up care provided. Patient has been educated regarding seeking additional support if needed with information listed on discharge paper work and emergency card provided. Patient has been escorted off unit to awaiting transportation for transport to home.

## 2021-04-14 NOTE — PROGRESS NOTES
Telemetry  CBC/BMP/PT/INR  HbA1c/Lipid panel  Glucose goal < 180 mg/dl  ST/ PT/OT to eval and treat    Stroke Discharge Plan  \Follow-up in stroke clinic. Can follow-up with me in 3 months. SUBJECTIVE  Interval History:    Patient was seen and examined this AM.   He is doing remarkably well. He reports he is back to his baseline. Review of Systems:   Constitutional: Denies fever, weight loss, fatigue and night sweats. Neurological: Denies headache, confusion, sleep difficulties, lightheadedness, spinning sensation, vision loss, tremor, weakness, numbness or tingling, incoordination, imbalance, and incontinence of bowel and sexual dysfunction. Psychiatric: Denies anxiety, depression and hallucinations. Eyes: Denies blurred vision, double vision and eye pain. ENMT: Denies difficulty swallowing, dental pain, hearing loss, and tinnitus. Cardiovascular: Denies chest pain and palpitations. Respiratory: Denies shortness of breath. Genitourinary: Denies urinary incontinence and retention. Integumentary: Denies rashes. Endocrine: Denies polydipsia and polyuria.     Past Medical History:   Diagnosis Date    CAD (coronary artery disease)     Cerebral artery occlusion with cerebral infarction ([facility] [address]     Hyperlipidemia     Hypertension     MI, old        Past Surgical History:   Procedure Laterality Date    CORONARY ANGIOPLASTY WITH STENT PLACEMENT      INSERTABLE CARDIAC MONITOR      reveal linq cardiac monitor    IR MECHANICAL ART THROMBECTOMY INTRACRANIAL  4/13/2021    IR MECHANICAL ART THROMBECTOMY INTRACRANIAL 4/13/2021 Jose Luna MD [facility] PROCEDURES    OTHER SURGICAL HISTORY  07/12/2016    Dr. Rudolph Hankins insertion       Current Medications   aspirin chewable tablet 81 mg, Daily  enoxaparin (LOVENOX) injection 40 mg, Daily  clopidogrel (PLAVIX) tablet 300 mg, Once  acetaminophen (TYLENOL) tablet 650 mg, Q4H PRN  labetalol (NORMODYNE;TRANDATE) injection 5 mg, Q10 Min PRN  hydrALAZINE (APRESOLINE) injection 5 mg, Q10 Min PRN  oxyCODONE (ROXICODONE) immediate release tablet 2.5 mg, Q4H PRN  famotidine (PEPCID) injection 20 mg, BID  senna (SENOKOT) tablet 8.6 mg, Nightly  polyethylene glycol (GLYCOLAX) packet 17 g, Daily  atorvastatin (LIPITOR) tablet 80 mg, Daily  baclofen (LIORESAL) tablet 10 mg, TID  captopril (CAPOTEN) tablet 50 mg, TID  metoprolol tartrate (LOPRESSOR) tablet 50 mg, BID  sodium chloride flush 0.9 % injection 5-40 mL, 2 times per day  sodium chloride flush 0.9 % injection 5-40 mL, PRN  0.9 % sodium chloride infusion, PRN  ondansetron (ZOFRAN) injection 4 mg, Q6H PRN  polyethylene glycol (GLYCOLAX) packet 17 g, Daily PRN  acetaminophen (TYLENOL) tablet 650 mg, Q6H PRN    Or  acetaminophen (TYLENOL) suppository 650 mg, Q6H PRN  perflutren lipid microspheres (DEFINITY) injection 1.65 mg, ONCE PRN        Allergies  Allergies   Allergen Reactions    Ciprofloxacin-Ciproflox Hcl Er      \"veins were red and burned\"    Pcn [Penicillins] Hives        Family History  No family history on file. Social History     Tobacco History     Smoking Status  Current Every Day Smoker Smoking Frequency  0.25 packs/day Smoking Tobacco Type  Cigars, Cigarettes    Smokeless Tobacco Use  Never Used          Alcohol History     Alcohol Use Status  Yes Drinks/Week  1 Cans of beer per week Amount  1.0 standard drinks of alcohol/wk Comment  social- mainly summer occasional beer          Drug Use     Drug Use Status  No          Sexual Activity     Sexually Active  Yes Partners  Female                Physical Exam  Vitals:    04/14/21 0400 04/14/21 0500 04/14/21 0600 04/14/21 0700   BP: (!) 148/77 121/73 118/60 127/63   Pulse: 86 77 78 76   Resp: 23 22 15 16   Temp: 98.8 °F (37.1 °C)      TempSrc: Oral      SpO2: 93% 91% 93% 92%   Weight:   268 lb 1.3 oz (121.6 kg)        I/O last 3 completed shifts:   In: 850 [P.O.:800; IV Piggyback:50]  Out: 1890 [Urine:1890]  No intake/output data recorded. Constitutional: Well developed, well nourished and in no acute distress. Respiratory: Clear to auscultation bilaterally with no use of accessory muscles during respiration. Cardiovascular:  No murmurs auscultated. Carotid arteries without bruits. Pedal pulses and radial pulses 2+ bilaterally. No edema in all four extremities. Mental Status:    Alert and oriented to person, place, and time. Recent and remote memory: Intact  Attention and concentration: Intact  Speech and language : Intact  Fund of knowledge: Intact  Judgement and Insight: Intact    Cranial Nerves:     II: Visual Fields: Full to confrontation bilaterally   III, IV, VI: Pupils: equally round and reactive to light, 3  to 2  mm bilaterally. EOMs: full with no nystagmus. V: Sensation intact to light touch and pin prick sensation bilaterally  VII: left facial droop   VIII: Hearing intact to finger rub bilaterally   IX,X: Palate elevates symmetrically. XI: head turn (sternocleidomastoid) and shoulder shrug (trapezius) 5/5 bilaterally   XII: Tongue is midline upon protrusion    Motor:   Normal tone and bulk. Normal fine finger movements. No pronator drift. No resting tremors, postural tremors or myoclonus. Upper Extremity Right Left  Lower Extremity Right Left  Deltoid              5 4      Hip Flexion             5 4  Biceps              5 4   Hip Extension             5 4  Triceps                        5 4   Hip Adduction             5 4  Wrist Extension 5 4   Knee Extension 5 4  Wrist Flexion             5 4                Knee Flexion 5 4  Index Finger Flexion 5 4  Ankle Dorsiflexion 5 4  Finger Extension 5 4  Ankle Plantarflexion 5 4  APB                        5 4   Extensor Hallucis Longus 5 4             NIH Stroke Scale  NIH Stroke Scale/Score at time of initial evaluation:    1A: Level of Consciousness 0 - alert; keenly responsive   1B: Ask Month and Age 0 - answers both questions correctly   1C:  Tell Patient To Open and Close Eyes, then Hand  Squeeze 0 - performs both tasks correctly   2: Test Horizontal Extraocular Movements 0 - normal   3: Test Visual Fields 0 - no visual loss   4: Test Facial Palsy 1 - minor paralysis (flattened nasolabial fold, asymmetric on smiling)   5A: Test Left Arm Motor Drift 1 - drift, limb holds 90 (or 45) degrees but drifts down before full 10 seconds: does not hit bed   5B: Test Right Arm Motor Drift 0 - no drift, limb holds 90 (or 45) degrees for full 10 seconds   6A: Test Left Leg Motor Drift 1 - drift; leg falls by the end of the 5 second period but does not hit bed   6B: Test Right Leg Motor Drift 0 - no drift; leg holds 30 degree position for full 5 seconds   7: Test Limb Ataxia (FNF/Heel-Shin) 0 - absent   8: Test Sensation 0 - normal; no sensory loss   9: Test Language/Aphasia 0 - no aphasia, normal   10: Test Dysarthria 0 - normal   11: Test Extinction/Inattention 0 - no abnormality   Total 3              Current Functional Status(Modified Lilliam Scale):  2-3-he is back to his baseline previous functional MRS scale      Imaging  Xr Hip Left (2-3 Views)    Result Date: 4/13/2021  EXAMINATION: TWO XRAY VIEWS OF THE LEFT HIP 4/13/2021 9:48 am COMPARISON: Left femur dated May 20, 2018 HISTORY: ORDERING SYSTEM PROVIDED HISTORY: fall at home TECHNOLOGIST PROVIDED HISTORY: Reason for exam:->fall at home What reading provider will be dictating this exam?->CRC FINDINGS: Mild osteoarthritis at the left hip associated with acetabular spurring. No fracture or dislocation. Mild acetabular spurring indicating underlying osteoarthritis.      Ct Head Wo Contrast    Result Date: 4/14/2021  EXAMINATION: CT OF THE HEAD WITHOUT CONTRAST  4/14/2021 2:16 am TECHNIQUE: CT of the head was performed without the administration of intravenous contrast. Dose modulation, iterative reconstruction, and/or weight based adjustment of the mA/kV was utilized to reduce the radiation dose to as low as reasonably achievable. COMPARISON: 04/13/2021 HISTORY: ORDERING SYSTEM PROVIDED HISTORY: post tPA, rule out hemorrhage TECHNOLOGIST PROVIDED HISTORY: Reason for exam:->post tPA, rule out hemorrhage Has a \"code stroke\" or \"stroke alert\" been called? ->No What reading provider will be dictating this exam?->CRC FINDINGS: Interval development of mild edema with sulcal effacement involving the lateral right frontal lobe, consistent with evolving MCA distribution infarct. No significant mass effect or midline shift. The basal cisterns are patent. No acute hemorrhage. Other chronic appearing findings described previously are unchanged. No acute hemorrhage. Mild right frontal lobe edema consistent with evolving right MCA territory infarct without significant mass effect or midline shift. No other significant change. Ct Head Wo Contrast    Result Date: 4/13/2021  EXAMINATION: CT OF THE HEAD WITHOUT CONTRAST  4/13/2021 1:40 am TECHNIQUE: CT of the head was performed without the administration of intravenous contrast. Dose modulation, iterative reconstruction, and/or weight based adjustment of the mA/kV was utilized to reduce the radiation dose to as low as reasonably achievable. COMPARISON: None. HISTORY: ORDERING SYSTEM PROVIDED HISTORY: stroke TECHNOLOGIST PROVIDED HISTORY: Has a \"code stroke\" or \"stroke alert\" been called? ->Yes Reason for exam:->stroke Decision Support Exception->Emergency Medical Condition (MA) FINDINGS: No intracranial hemorrhage, mass effect or midline shift. Basal cisterns are patent. Ventricles are not enlarged. Cortical volume loss. Areas of decreased attenuation in the bilateral white matter are nonspecific but likely due to chronic microvascular ischemia. Vascular calcifications. Some gliosis adjacent to the right lateral ventricle and extending into the external capsule is consistent with an old infarct. No acute intracranial findings.   Of note, please see separate dictated significant     Ct Brain Perfusion    Addendum Date: 2021    Addendum: Comparison 2018 There appears to be progression when compared to previous    Result Date: 2021  Patient MRN: [id number] : 1950 Age:  66 years Gender: Male Order Date: 2021 Exam: CT BRAIN PERFUSION Number of Images: 333 views Indication:   stroke stroke Comparison: None. Findings: Perfusion images demonstrate symmetric blood volume Blood flow images demonstrate asymmetric blood flow There is significant ischemic penumbrar identified. There is no significant core infarct identified. Significant ischemic penumbra identified involving the right temporal lobe the right posterior frontal lobe and to lesser extent the right parietal lobe     Cta Head W Contrast    Addendum Date: 2021    Addendum: When compared to previous, there is a severe stenosis in the right M1 segment on the previous study this appears to have progressed to occlusion. Result Date: 2021  Patient MRN:  [id number] : 1950 Age: 66 years Gender: Male Order Date:  2021 EXAM: CTA NECK W CONTRAST, CTA HEAD W CONTRAST NUMBER OF IMAGES:  56 INDICATION:  stroke stroke COMPARISON: 2018 Technique: Low-dose CT  acquisition technique included one of following options; 1 . Automated exposure control, 2. Adjustment of MA and or KV according to patient's size or 3. Use of iterative reconstruction. Contiguous spiral images were obtained in the axial plane, following the administration of intravenous contrast using CT angiographic protocol. Sagittal and coronal images were reconstructed from the axial plane acquisition. Additional MIP reconstructions were presented to aid in the interpretation of this study. Images were obtained from the skull base cranially. There is mild calcified plaque identified in the vessels compatible with atherosclerotic disease. There is aortic arch and great vessels demonstrate mild atherosclerotic disease.  The right carotid is unremarkable. The left carotid is unremarkable. The right vertebral artery is unremarkable. The left vertebral artery is unremarkable. The basilar artery is unremarkable. The middle cerebral arteries are abnormal there is a right M1 occlusion The anterior cerebral arteries are unremarkable. The posterior cerebral arteries are unremarkable. 1.  Right M1 occlusion . 2. Estimated stenosis of the proximal right and left internal carotid artery by NASCET criteria is not hemodynamically significant     Ir Mechanical Art Thrombectomy Intracranial    Result Date: 4/13/2021  IR MECHANICAL ART THROMBECTOMY INTRACRANIAL PROCEDURE PERFORMED: Cerebral angiogram with mechanical thrombectomy of Right MCA thrombus COMPLETED DATE:  4/13/2021 4:19 AM CLINICAL HISTORY/PRE-PROCEDURE DIAGNOSIS: Acute ischemic stroke. Patient comes in with acute onset of symptoms around 11:30 PM on 4/12/2021. He had a history of right MCA stenosis and was deemed not to be a candidate for intervention at the time. CT perfusion showing salvageable penumbra POST-PROCEDURE DIAGNOSIS: Same COMPARISON: CT angiographic documentation done on same date ANESTHESIA: Conscious sedation and local anesthesia VESSELS CATHETERIZED: 1. Right Internal Carotid Artery 2. Right middle cerebral artery RADIATION EXPOSURE DATA:  598.3 mGy TOTAL FLUOROSCOPY TIME: 6 minutes and 28 seconds CONTRAST USED: 30 mL of Isovue-300 PROCEDURE: Following informed consent, the patient was brought to the angiography suite, both groins are prepped and draped in usual sterile manner. Vascular access was obtained in the right common femoral artery with a 8-Argentine sheath which was connected to continuous heparinized saline flush. A 6-Argentine Neuron MAX guide catheter was prepped and with the support of a diagnostic catheter was brought into the aorta, double flushed and connected to continuous heparinized saline flush.   The balloon guide catheter was then telescoped into the

## 2021-04-14 NOTE — PLAN OF CARE
Problem: Neurological  Goal: Maximum potential motor/sensory/cognitive function  4/14/2021 0047 by Cipriano Cornell RN  Outcome: Met This Shift  4/13/2021 1831 by Yossi Patel RN  Outcome: Ongoing     Problem: Skin Integrity:  Goal: Will show no infection signs and symptoms  Description: Will show no infection signs and symptoms  4/14/2021 0744 by Ubaldo Russell RN  Outcome: Met This Shift  4/14/2021 0047 by Cipriano Cornell RN  Outcome: Met This Shift  4/13/2021 1831 by Yossi Patel RN  Outcome: Met This Shift  Goal: Absence of new skin breakdown  Description: Absence of new skin breakdown  4/14/2021 0744 by Ubaldo Russell RN  Outcome: Met This Shift  4/14/2021 0047 by Cipriano Cornell RN  Outcome: Met This Shift  4/13/2021 1831 by Yossi Patel RN  Outcome: Met This Shift     Problem: Falls - Risk of:  Goal: Will remain free from falls  Description: Will remain free from falls  4/14/2021 0744 by Ubaldo Russell RN  Outcome: Met This Shift  4/14/2021 0047 by Cipriano Cronell RN  Outcome: Met This Shift  4/13/2021 1831 by Yossi Patel RN  Outcome: Met This Shift  Goal: Absence of physical injury  Description: Absence of physical injury  4/14/2021 0744 by Ubaldo Russell RN  Outcome: Met This Shift  4/14/2021 0047 by Cipriano Cornell RN  Outcome: Met This Shift  4/13/2021 1831 by Yossi Patel RN  Outcome: Met This Shift

## 2021-04-14 NOTE — PROGRESS NOTES
Intensive Care Unit  Critical Care Consult 4/14/2021      Date of Admission: 4/13/21    Date of ICU Admission:  4/13/21    CC: left side weakness    HPI: 66year old male presented to the ED by EMS for sudden onset left sided weakness. He was using the bathroom at the time when he fell to the left and hit the bathtub. He states he did not hit his head but did injure his left hip and side. He has history of previous stroke with no residual effects and was taking his ASA and plavix. He had tPA in the past. His initial NIHSS was15. He was found to have a right MCA thrombosis. He was taken to IR for thrombectomy and tPA. While in IR, he did develop a nosebleed, there is a rhino rocket in place in the right nare. His symptoms did resolve. There is a large ecchymotic area to the left hip and smaller ecchymotic area to left left flank and left middle toe.     4/13 alert and oriented. Moving all extremities and following commands. Rhino rocket removed. 4/14 no issues overnight. Moving all extremities and following commands.  He states he is confused at times but is alert and oriented at time of exam.     Problem List:   Patient Active Problem List   Diagnosis    Cerebrovascular accident (CVA) due to occlusion of right middle cerebral artery ([address]    Coronary artery disease involving native coronary artery of native heart without angina pectoris    History of myocardial infarction    Essential hypertension    Mixed hyperlipidemia    Status post placement of implantable loop recorder    Acute ischemic stroke ([address]    History of CVA (cerebrovascular accident) without residual deficits    History of TIA (transient ischemic attack)    Acute CVA (cerebrovascular accident) ([address]    Ischemic cerebral vascular accident (CVA) due to stenosis of large extracranial artery ([address]    Red blood cell antibody positive       Surgical/Interventional Procedures: right MCA thrombectomy and tPA    PMH:    has a past medical history Assessment & Plan:       Neuro: acute stroke d/t right MCA thrombosis s/p thrombectomy and tPA, hx of previous stroke with tPA  Monitor neuro status  Neurology following  Stroke education    CV: HX of HTN, hyperlipidemia, CAD, MI  Monitor hemodynamics. BP goal less than 160   PRN hydralazine & labetalol. Statin. 2D Echo. Pulm: no acute issues  Monitor RR & SpO2. Oxygen PRN  Encourage cough and deep breathing     GI: No acute issues. Pepcid  Diet. Monitor bowel function. Renal:  No acute issues  Monitor BUN & Cr.   Monitor electrolytes & replace as needed. Monitor urine output. ID: no acute issues    Endocrine: No acute issues. Monitor BS. Keep below 180    MSK: left hip ecchymosis, left side weakness  Xray left hip negative for fracture  ROM. Turn & reposition. Heme: nosebleed right nare, resolved  Monitor CBC. Bowel regime: glycolax, senna Last BM 4/13  Pain control/Sedation: Tylenol  DVT prophylaxis: SCDs.  lovenox   GI prophylaxis: Pepcid, diet  Ancillary consults: neurology, medicine  Family update: will update when available  Code status:  full    Disposition:  Transfer to telemetry      Electronically signed by Juan Alberto Mccarty CNP on 4/14/2021 at 10:27 AM

## 2021-04-14 NOTE — CARE COORDINATION
Discharge plan is for [facility]. Precert started today.

## 2021-04-14 NOTE — PLAN OF CARE
Problem: Neurological  Goal: Maximum potential motor/sensory/cognitive function  4/14/2021 0047 by Waylon Yang RN  Outcome: Met This Shift  4/13/2021 1831 by Hans Og RN  Outcome: Ongoing     Problem: Skin Integrity:  Goal: Will show no infection signs and symptoms  Description: Will show no infection signs and symptoms  4/14/2021 0047 by Waylon Yang RN  Outcome: Met This Shift  4/13/2021 1831 by Hans Og RN  Outcome: Met This Shift  Goal: Absence of new skin breakdown  Description: Absence of new skin breakdown  4/14/2021 0047 by Waylon Yang RN  Outcome: Met This Shift  4/13/2021 1831 by Hans Og RN  Outcome: Met This Shift     Problem: Falls - Risk of:  Goal: Will remain free from falls  Description: Will remain free from falls  4/14/2021 0047 by Waylon Yang RN  Outcome: Met This Shift  4/13/2021 1831 by Hans Og RN  Outcome: Met This Shift  Goal: Absence of physical injury  Description: Absence of physical injury  4/14/2021 0047 by Waylon Yang RN  Outcome: Met This Shift  4/13/2021 1831 by Hans Og RN  Outcome: Met This Shift

## 2021-04-14 NOTE — PROGRESS NOTES
Department of Internal Berhane Peck M.D. Progress Note      SUBJECTIVE: He is responds appropriately to questions although insight may be hindered    OBJECTIVE moves all extremities and can move his fingers.     Medications    Current Facility-Administered Medications: aspirin chewable tablet 81 mg, 81 mg, Oral, Daily  enoxaparin (LOVENOX) injection 40 mg, 40 mg, Subcutaneous, Daily  clopidogrel (PLAVIX) tablet 75 mg, 75 mg, Oral, Daily  acetaminophen (TYLENOL) tablet 650 mg, 650 mg, Oral, Q4H PRN  labetalol (NORMODYNE;TRANDATE) injection 5 mg, 5 mg, Intravenous, Q10 Min PRN  hydrALAZINE (APRESOLINE) injection 5 mg, 5 mg, Intravenous, Q10 Min PRN  oxyCODONE (ROXICODONE) immediate release tablet 2.5 mg, 2.5 mg, Oral, Q4H PRN  famotidine (PEPCID) injection 20 mg, 20 mg, Intravenous, BID  senna (SENOKOT) tablet 8.6 mg, 1 tablet, Oral, Nightly  polyethylene glycol (GLYCOLAX) packet 17 g, 17 g, Oral, Daily  atorvastatin (LIPITOR) tablet 80 mg, 80 mg, Oral, Daily  baclofen (LIORESAL) tablet 10 mg, 10 mg, Oral, TID  captopril (CAPOTEN) tablet 50 mg, 50 mg, Oral, TID  metoprolol tartrate (LOPRESSOR) tablet 50 mg, 50 mg, Oral, BID  sodium chloride flush 0.9 % injection 5-40 mL, 5-40 mL, Intravenous, 2 times per day  sodium chloride flush 0.9 % injection 5-40 mL, 5-40 mL, Intravenous, PRN  0.9 % sodium chloride infusion, 25 mL, Intravenous, PRN  [DISCONTINUED] promethazine (PHENERGAN) tablet 12.5 mg, 12.5 mg, Oral, Q6H PRN **OR** ondansetron (ZOFRAN) injection 4 mg, 4 mg, Intravenous, Q6H PRN  polyethylene glycol (GLYCOLAX) packet 17 g, 17 g, Oral, Daily PRN  acetaminophen (TYLENOL) tablet 650 mg, 650 mg, Oral, Q6H PRN **OR** acetaminophen (TYLENOL) suppository 650 mg, 650 mg, Rectal, Q6H PRN  perflutren lipid microspheres (DEFINITY) injection 1.65 mg, 1.5 mL, Intravenous, ONCE PRN  Physical    VITALS:  /63   Pulse 76   Temp 98.8 °F (37.1 °C) (Oral)   Resp 16   Wt 268 lb 1.3 oz (121.6 kg)   SpO2 92%   BMI 35.37 kg/m²   24HR INTAKE/OUTPUT:      Intake/Output Summary (Last 24 hours) at 4/14/2021 0806  Last data filed at 4/14/2021 0700  Gross per 24 hour   Intake 850 ml   Output 1640 ml   Net -790 ml     LUNGS:  No increased work of breathing, good air exchange, clear to auscultation bilaterally, no crackles or wheezing  CARDIOVASCULAR:  Normal apical impulse, regular rate and rhythm, normal S1 and S2, no S3 or S4, and no murmur noted  Data    CBC with Differential:    Lab Results   Component Value Date    WBC 10.8 04/14/2021    RBC 3.35 04/14/2021    HGB 9.7 04/14/2021    HCT 30.1 04/14/2021     04/14/2021    MCV 89.9 04/14/2021    MCH 29.0 04/14/2021    MCHC 32.2 04/14/2021    RDW 15.3 04/14/2021    LYMPHOPCT 23.7 04/13/2021    MONOPCT 6.3 04/13/2021    BASOPCT 0.4 04/13/2021    MONOSABS 0.81 04/13/2021    LYMPHSABS 3.05 04/13/2021    EOSABS 0.26 04/13/2021    BASOSABS 0.05 04/13/2021     CMP:    Lab Results   Component Value Date     04/14/2021    K 4.4 04/14/2021    K 4.4 04/14/2021     04/14/2021    CO2 25 04/14/2021    BUN 11 04/14/2021    CREATININE 0.9 04/14/2021    GFRAA >60 04/14/2021    LABGLOM >60 04/14/2021    GLUCOSE 107 04/14/2021    GLUCOSE 90 03/28/2012    PROT 5.5 04/14/2021    LABALBU 3.5 04/14/2021    CALCIUM 8.2 04/14/2021    BILITOT 0.4 04/14/2021    ALKPHOS 59 04/14/2021    AST 22 04/14/2021    ALT 17 04/14/2021     PT/INR:    Lab Results   Component Value Date    PROTIME 11.3 04/13/2021    INR 1.0 04/13/2021       ASSESSMENT AND PLAN      Principal Problem:    Ischemic cerebral vascular accident (CVA) due to stenosis of large extracranial artery ([address]  Plan: Rehab  Active Problems:  Epistaxis appears to resolve  Hypertension under control  Spinal stenosis aided by baclofen  Needs to sit up in chair  Transfer to intermediate unit    Electronically signed by Vin Orantes MD on 4/14/2021 at 8:06 AM

## 2021-04-15 ENCOUNTER — HOSPITAL ENCOUNTER (INPATIENT)
Age: 71
LOS: 15 days | Discharge: HOME | DRG: 056 | End: 2021-04-30
Attending: PHYSICAL MEDICINE & REHABILITATION | Admitting: PHYSICAL MEDICINE & REHABILITATION
Payer: MEDICARE

## 2021-04-15 VITALS
HEART RATE: 70 BPM | WEIGHT: 268 LBS | TEMPERATURE: 98.1 F | SYSTOLIC BLOOD PRESSURE: 114 MMHG | BODY MASS INDEX: 35.52 KG/M2 | RESPIRATION RATE: 18 BRPM | OXYGEN SATURATION: 96 % | DIASTOLIC BLOOD PRESSURE: 68 MMHG | HEIGHT: 73 IN

## 2021-04-15 PROBLEM — I63.9 ISCHEMIC STROKE (HCC): Status: ACTIVE | Noted: 2021-04-15

## 2021-04-15 LAB
ANION GAP SERPL CALCULATED.3IONS-SCNC: 6 MMOL/L (ref 7–16)
BUN BLDV-MCNC: 10 MG/DL (ref 8–23)
CALCIUM SERPL-MCNC: 8.1 MG/DL (ref 8.6–10.2)
CHLORIDE BLD-SCNC: 102 MMOL/L (ref 98–107)
CO2: 27 MMOL/L (ref 22–29)
CREAT SERPL-MCNC: 0.8 MG/DL (ref 0.7–1.2)
GFR AFRICAN AMERICAN: >60
GFR NON-AFRICAN AMERICAN: >60 ML/MIN/1.73
GLUCOSE BLD-MCNC: 98 MG/DL (ref 74–99)
LV EF: 48 %
LVEF MODALITY: NORMAL
POTASSIUM SERPL-SCNC: 4 MMOL/L (ref 3.5–5)
SARS-COV-2, NAAT: NOT DETECTED
SODIUM BLD-SCNC: 135 MMOL/L (ref 132–146)

## 2021-04-15 PROCEDURE — 97130 THER IVNTJ EA ADDL 15 MIN: CPT

## 2021-04-15 PROCEDURE — 6360000002 HC RX W HCPCS: Performed by: CLINICAL NURSE SPECIALIST

## 2021-04-15 PROCEDURE — 93306 TTE W/DOPPLER COMPLETE: CPT

## 2021-04-15 PROCEDURE — 6370000000 HC RX 637 (ALT 250 FOR IP): Performed by: INTERNAL MEDICINE

## 2021-04-15 PROCEDURE — 2500000003 HC RX 250 WO HCPCS: Performed by: CLINICAL NURSE SPECIALIST

## 2021-04-15 PROCEDURE — 2580000003 HC RX 258: Performed by: CLINICAL NURSE SPECIALIST

## 2021-04-15 PROCEDURE — 6370000000 HC RX 637 (ALT 250 FOR IP): Performed by: CLINICAL NURSE SPECIALIST

## 2021-04-15 PROCEDURE — 97530 THERAPEUTIC ACTIVITIES: CPT

## 2021-04-15 PROCEDURE — 87635 SARS-COV-2 COVID-19 AMP PRB: CPT

## 2021-04-15 PROCEDURE — 1280000000 HC REHAB R&B

## 2021-04-15 PROCEDURE — 99232 SBSQ HOSP IP/OBS MODERATE 35: CPT | Performed by: PSYCHIATRY & NEUROLOGY

## 2021-04-15 PROCEDURE — 36415 COLL VENOUS BLD VENIPUNCTURE: CPT

## 2021-04-15 PROCEDURE — 6360000004 HC RX CONTRAST MEDICATION: Performed by: INTERNAL MEDICINE

## 2021-04-15 PROCEDURE — 97535 SELF CARE MNGMENT TRAINING: CPT

## 2021-04-15 PROCEDURE — 80048 BASIC METABOLIC PNL TOTAL CA: CPT

## 2021-04-15 PROCEDURE — 97129 THER IVNTJ 1ST 15 MIN: CPT

## 2021-04-15 RX ORDER — POLYETHYLENE GLYCOL 3350 17 G/17G
17 POWDER, FOR SOLUTION ORAL DAILY
Status: DISCONTINUED | OUTPATIENT
Start: 2021-04-16 | End: 2021-04-22

## 2021-04-15 RX ORDER — ACETAMINOPHEN 325 MG/1
650 TABLET ORAL EVERY 4 HOURS PRN
Status: CANCELLED | OUTPATIENT
Start: 2021-04-15

## 2021-04-15 RX ORDER — ASPIRIN 81 MG/1
81 TABLET, CHEWABLE ORAL DAILY
Status: DISCONTINUED | OUTPATIENT
Start: 2021-04-16 | End: 2021-04-30 | Stop reason: HOSPADM

## 2021-04-15 RX ORDER — SENNA PLUS 8.6 MG/1
1 TABLET ORAL NIGHTLY
Status: DISCONTINUED | OUTPATIENT
Start: 2021-04-15 | End: 2021-04-22

## 2021-04-15 RX ORDER — SENNA PLUS 8.6 MG/1
1 TABLET ORAL NIGHTLY
Status: CANCELLED | OUTPATIENT
Start: 2021-04-15

## 2021-04-15 RX ORDER — CAPTOPRIL 12.5 MG/1
50 TABLET ORAL 3 TIMES DAILY
Status: DISCONTINUED | OUTPATIENT
Start: 2021-04-15 | End: 2021-04-16

## 2021-04-15 RX ORDER — ASPIRIN 81 MG/1
81 TABLET, CHEWABLE ORAL DAILY
Status: CANCELLED | OUTPATIENT
Start: 2021-04-16

## 2021-04-15 RX ORDER — CLOPIDOGREL BISULFATE 75 MG/1
75 TABLET ORAL DAILY
Status: CANCELLED | OUTPATIENT
Start: 2021-04-16

## 2021-04-15 RX ORDER — ACETAMINOPHEN 325 MG/1
650 TABLET ORAL EVERY 4 HOURS PRN
Status: DISCONTINUED | OUTPATIENT
Start: 2021-04-15 | End: 2021-04-30 | Stop reason: HOSPADM

## 2021-04-15 RX ORDER — METOPROLOL TARTRATE 50 MG/1
50 TABLET, FILM COATED ORAL 2 TIMES DAILY
Status: DISCONTINUED | OUTPATIENT
Start: 2021-04-15 | End: 2021-04-30 | Stop reason: HOSPADM

## 2021-04-15 RX ORDER — CLOPIDOGREL BISULFATE 75 MG/1
75 TABLET ORAL DAILY
Status: DISCONTINUED | OUTPATIENT
Start: 2021-04-16 | End: 2021-04-30 | Stop reason: HOSPADM

## 2021-04-15 RX ORDER — CAPTOPRIL 12.5 MG/1
50 TABLET ORAL 3 TIMES DAILY
Status: CANCELLED | OUTPATIENT
Start: 2021-04-15

## 2021-04-15 RX ORDER — ATORVASTATIN CALCIUM 80 MG/1
80 TABLET, FILM COATED ORAL DAILY
Status: DISCONTINUED | OUTPATIENT
Start: 2021-04-16 | End: 2021-04-30 | Stop reason: HOSPADM

## 2021-04-15 RX ORDER — BACLOFEN 10 MG/1
10 TABLET ORAL 3 TIMES DAILY
Status: CANCELLED | OUTPATIENT
Start: 2021-04-15

## 2021-04-15 RX ORDER — METOPROLOL TARTRATE 50 MG/1
50 TABLET, FILM COATED ORAL 2 TIMES DAILY
Status: CANCELLED | OUTPATIENT
Start: 2021-04-15

## 2021-04-15 RX ORDER — POLYETHYLENE GLYCOL 3350 17 G/17G
17 POWDER, FOR SOLUTION ORAL DAILY
Status: CANCELLED | OUTPATIENT
Start: 2021-04-16

## 2021-04-15 RX ORDER — ATORVASTATIN CALCIUM 80 MG/1
80 TABLET, FILM COATED ORAL DAILY
Status: CANCELLED | OUTPATIENT
Start: 2021-04-16

## 2021-04-15 RX ORDER — BACLOFEN 10 MG/1
10 TABLET ORAL 3 TIMES DAILY
Status: DISCONTINUED | OUTPATIENT
Start: 2021-04-15 | End: 2021-04-30 | Stop reason: HOSPADM

## 2021-04-15 RX ADMIN — STANDARDIZED SENNA CONCENTRATE 8.6 MG: 8.6 TABLET ORAL at 21:05

## 2021-04-15 RX ADMIN — METOPROLOL TARTRATE 50 MG: 50 TABLET, FILM COATED ORAL at 10:29

## 2021-04-15 RX ADMIN — ENOXAPARIN SODIUM 40 MG: 40 INJECTION SUBCUTANEOUS at 10:29

## 2021-04-15 RX ADMIN — CLOPIDOGREL 75 MG: 75 TABLET, FILM COATED ORAL at 10:30

## 2021-04-15 RX ADMIN — BACLOFEN 10 MG: 10 TABLET ORAL at 14:15

## 2021-04-15 RX ADMIN — ACETAMINOPHEN 650 MG: 325 TABLET ORAL at 21:10

## 2021-04-15 RX ADMIN — Medication 10 ML: at 10:35

## 2021-04-15 RX ADMIN — PERFLUTREN 1.65 MG: 6.52 INJECTION, SUSPENSION INTRAVENOUS at 10:28

## 2021-04-15 RX ADMIN — CAPTOPRIL 50 MG: 12.5 TABLET ORAL at 21:06

## 2021-04-15 RX ADMIN — CAPTOPRIL 50 MG: 12.5 TABLET ORAL at 10:30

## 2021-04-15 RX ADMIN — POLYETHYLENE GLYCOL 3350 17 G: 17 POWDER, FOR SOLUTION ORAL at 10:30

## 2021-04-15 RX ADMIN — CAPTOPRIL 50 MG: 12.5 TABLET ORAL at 14:15

## 2021-04-15 RX ADMIN — METOPROLOL TARTRATE 50 MG: 50 TABLET, FILM COATED ORAL at 21:05

## 2021-04-15 RX ADMIN — ASPIRIN 81 MG CHEWABLE TABLET 81 MG: 81 TABLET CHEWABLE at 10:30

## 2021-04-15 RX ADMIN — BACLOFEN 10 MG: 10 TABLET ORAL at 21:06

## 2021-04-15 RX ADMIN — FAMOTIDINE 20 MG: 10 INJECTION INTRAVENOUS at 11:30

## 2021-04-15 RX ADMIN — OXYCODONE HYDROCHLORIDE 2.5 MG: 5 TABLET ORAL at 05:47

## 2021-04-15 RX ADMIN — BACLOFEN 10 MG: 10 TABLET ORAL at 10:29

## 2021-04-15 RX ADMIN — ATORVASTATIN CALCIUM 80 MG: 80 TABLET, FILM COATED ORAL at 10:30

## 2021-04-15 ASSESSMENT — PAIN SCALES - GENERAL
PAINLEVEL_OUTOF10: 4
PAINLEVEL_OUTOF10: 7

## 2021-04-15 ASSESSMENT — PAIN DESCRIPTION - ONSET: ONSET: ON-GOING

## 2021-04-15 ASSESSMENT — PAIN DESCRIPTION - LOCATION: LOCATION: BACK;HIP

## 2021-04-15 ASSESSMENT — PAIN DESCRIPTION - DESCRIPTORS: DESCRIPTORS: ACHING;DISCOMFORT;PRESSURE

## 2021-04-15 ASSESSMENT — PAIN DESCRIPTION - PROGRESSION
CLINICAL_PROGRESSION: NOT CHANGED
CLINICAL_PROGRESSION: NOT CHANGED

## 2021-04-15 ASSESSMENT — PAIN DESCRIPTION - PAIN TYPE: TYPE: ACUTE PAIN

## 2021-04-15 NOTE — PROGRESS NOTES
Department of Internal Filipesanam Molina M.D. Progress Note      SUBJECTIVE: He reports bowel activity yesterday and is urinating on his own.   Echocardiogram has not been done process for acute rehab is in place as he needs gait training transfer training and cognitive training    OBJECTIVE abdomen is soft and nontender, both legs have mobility as well as both arms have mobility    Medications    Current Facility-Administered Medications: perflutren lipid microspheres (DEFINITY) injection 1.65 mg, 1.5 mL, Intravenous, ONCE PRN  aspirin chewable tablet 81 mg, 81 mg, Oral, Daily  enoxaparin (LOVENOX) injection 40 mg, 40 mg, Subcutaneous, Daily  clopidogrel (PLAVIX) tablet 75 mg, 75 mg, Oral, Daily  acetaminophen (TYLENOL) tablet 650 mg, 650 mg, Oral, Q4H PRN  famotidine (PEPCID) injection 20 mg, 20 mg, Intravenous, BID  senna (SENOKOT) tablet 8.6 mg, 1 tablet, Oral, Nightly  polyethylene glycol (GLYCOLAX) packet 17 g, 17 g, Oral, Daily  atorvastatin (LIPITOR) tablet 80 mg, 80 mg, Oral, Daily  baclofen (LIORESAL) tablet 10 mg, 10 mg, Oral, TID  captopril (CAPOTEN) tablet 50 mg, 50 mg, Oral, TID  metoprolol tartrate (LOPRESSOR) tablet 50 mg, 50 mg, Oral, BID  sodium chloride flush 0.9 % injection 5-40 mL, 5-40 mL, Intravenous, 2 times per day  sodium chloride flush 0.9 % injection 5-40 mL, 5-40 mL, Intravenous, PRN  0.9 % sodium chloride infusion, 25 mL, Intravenous, PRN  [DISCONTINUED] promethazine (PHENERGAN) tablet 12.5 mg, 12.5 mg, Oral, Q6H PRN **OR** ondansetron (ZOFRAN) injection 4 mg, 4 mg, Intravenous, Q6H PRN  Physical    VITALS:  /75   Pulse 79   Temp 98.1 °F (36.7 °C) (Temporal)   Resp 18   Ht 6' 1\" (1.854 m)   Wt 268 lb (121.6 kg)   SpO2 96%   BMI 35.36 kg/m²   24HR INTAKE/OUTPUT:      Intake/Output Summary (Last 24 hours) at 4/15/2021 0731  Last data filed at 4/15/2021 0430  Gross per 24 hour   Intake 10 ml   Output 2205 ml   Net -2195 ml     LUNGS:  No increased work of breathing, good air exchange, clear to auscultation bilaterally, no crackles or wheezing  CARDIOVASCULAR: S1-S2 weak heart sounds  Data    CBC with Differential:    Lab Results   Component Value Date    WBC 10.8 04/14/2021    RBC 3.35 04/14/2021    HGB 9.7 04/14/2021    HCT 30.1 04/14/2021     04/14/2021    MCV 89.9 04/14/2021    MCH 29.0 04/14/2021    MCHC 32.2 04/14/2021    RDW 15.3 04/14/2021    LYMPHOPCT 21.9 04/14/2021    MONOPCT 7.5 04/14/2021    BASOPCT 0.2 04/14/2021    MONOSABS 0.81 04/14/2021    LYMPHSABS 2.36 04/14/2021    EOSABS 0.02 04/14/2021    BASOSABS 0.02 04/14/2021     CMP:    Lab Results   Component Value Date     04/14/2021    K 4.4 04/14/2021    K 4.4 04/14/2021     04/14/2021    CO2 25 04/14/2021    BUN 11 04/14/2021    CREATININE 0.9 04/14/2021    GFRAA >60 04/14/2021    LABGLOM >60 04/14/2021    GLUCOSE 107 04/14/2021    GLUCOSE 90 03/28/2012    PROT 5.5 04/14/2021    LABALBU 3.5 04/14/2021    CALCIUM 8.2 04/14/2021    BILITOT 0.4 04/14/2021    ALKPHOS 59 04/14/2021    AST 22 04/14/2021    ALT 17 04/14/2021     PT/INR:    Lab Results   Component Value Date    PROTIME 11.3 04/13/2021    INR 1.0 04/13/2021       ASSESSMENT AND PLAN      Active Problems:    Acute CVA (cerebrovascular accident) [facility])  Plan: Rehab unit  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

## 2021-04-15 NOTE — PLAN OF CARE
Problem: Neurological  Goal: Maximum potential motor/sensory/cognitive function  Outcome: Met This Shift     Problem: Skin Integrity:  Goal: Will show no infection signs and symptoms  Description: Will show no infection signs and symptoms  Outcome: Met This Shift  Goal: Absence of new skin breakdown  Description: Absence of new skin breakdown  Outcome: Met This Shift     Problem: Falls - Risk of:  Goal: Will remain free from falls  Description: Will remain free from falls  Outcome: Met This Shift  Goal: Absence of physical injury  Description: Absence of physical injury  Outcome: Met This Shift     Problem: Pain:  Goal: Pain level will decrease  Description: Pain level will decrease  Outcome: Met This Shift

## 2021-04-15 NOTE — LETTER
PORTABLE PATIENT PROFILE  Song Lindsey  [id number]    MEDICAL DIAGNOSIS/CONDITION:   Patient Active Problem List   Diagnosis    Cerebrovascular accident (CVA) due to occlusion of right middle cerebral artery [facility]    Coronary artery disease involving native coronary artery of native heart without angina pectoris    History of myocardial infarction    Essential hypertension    Mixed hyperlipidemia    Status post placement of implantable loop recorder    Acute ischemic stroke ([facility]    History of CVA (cerebrovascular accident) without residual deficits    History of TIA (transient ischemic attack)    Acute [facility]A (cerebrovascular accident) [facility]    Ischemic cerebral vascular accident (CVA) due to stenosis of large extracranial artery ([facility]    Red blood cell antibody positive    Ischemic stroke ([facility]       INSURANCE INFORMATION:  Payor: Sarina Restrepo /  /  /     ADVANCED DIRECTIVES:   Advance Directives (For Healthcare)  Pre-existing DNR Comfort Care/DNR Arrest/DNI Order: No  Healthcare Directive: Yes, patient has an advance directive for healthcare treatment  Type of Healthcare Directive: Durable power of  for health care, Health care treatment directive, Living will  Copy in Chart: Yes, copy in chart  Chart Copy Status [de-identified] Current  [unfilled]     EMERGENCY CONTACT:       RISK FACTORS:   Social History     Tobacco Use    Smoking status: Current Every Day Smoker     Packs/day: 0.25     Types: Cigars, Cigarettes    Smokeless tobacco: Never Used   Substance Use Topics    Alcohol use:  Yes     Alcohol/week: 1.0 standard drinks     Types: 1 Cans of beer per week     Comment: social- mainly summer occasional beer       ALLERGIES:  Allergies   Allergen Reactions    Ciprofloxacin-Ciproflox Hcl Er      \"veins were red and burned\"    Pcn [Penicillins] Hives       IMMUNIZATIONS:  Immunization History   Administered Date(s) Administered    Pneumococcal Polysaccharide ([id number]) 03/28/2012 SWALLOWING:   Difficulty Chewing or Swallowing Food: No    VISION AND HEARING:        PHYSICIANS INVOLVED WITH CARE:    Jose Horan MD  No ref.  provider found  MD Jose Magana MD

## 2021-04-15 NOTE — PROGRESS NOTES
Patient seen for 30 minutes for cognition. Patient recalled 70% of short ~4 sentence stories with minimal cues. Patient reported not recalling unimportant information because he's used to 'tuning out' his wife. Patient reports having strengths in visual memory.   Shanna Nunez   SLP student intern

## 2021-04-15 NOTE — PROGRESS NOTES
STROKE NEUROLOGY NOTE    Will sign off, all stroke needs adressed, Detailed recommendations as below , please call with any questions    Consult requested by: Attending: Vin Orantes MD   Reason for Consultation: [id number] [employer]    Patient: Lary Saldana   : 1950   MRN: [id number]   Date of Service: 4/15/2021     -----------------------------------------------------------------------------------------------------------------  Stroke Team Contact numbers:  Rock Randall MD : Please contact via [facility], Desk phone daytime [phone]  Carline Miller MD: Please contact via [address] [facility], Stroke Coordinator: [phone]   Olivia Park, Stroke Navigator [phone]  -----------------------------------------------------------------------------------------------------------------  Impression:  #1  Acute ischemic stroke in [facility] distribution secondary to ICAD with thrombectomy of  Lesion with no residual stenosis post procedure      Very small right fronto parietal residual stroke        ICAD acute on chronic thrombus was removed with no residual stenosis post op      Plan:    Interventions  TpA - YEs  Endovascular - YES TICI 3    Imaging  Repeat CT head reviewed and shows stroke. MRI brain is unnecessary at this time. Echocardiogram not necessary as etiology of stroke is secondary to this chronic thrombus that was subocclusive on prior imaging/intracranial atherosclerotic disease. Would not  as etiology of stroke is well-known. Medications  Antiplatelet: ASA [id number] 75 mg daily for 21 days -followed by Plavix monotherapy alone. Anticoagulation: Not currently indicated. Statin: Lipitor 40 mg at bedtime. Blood Pressure Goals: If no tPA received, SBP goal of <220/110 x 24 hours then <140/90. If tPA received, SBP goal of  <180/105 x 24 hours then <140/90  DVT prophylaxis: Lovenox started.     Primary Team  Discontinue Warren. NIH neuro checks q4h. Call stroke team immediatly if increase of NIH score by 4, sudden HA or decreased LOC. Stat CT w/o contrast recommended. Cardiac Telemetry  CBC/BMP/PT/INR  HbA1c/Lipid panel  Glucose goal < 180 mg/dl  ST/ PT/OT to eval and treat    Stroke Discharge Plan  \Follow-up in stroke clinic. Can follow-up with me in 3 months. SUBJECTIVE  Interval History:    Patient was seen and examined this AM.   He is doing remarkably well. He reports he is back to his baseline. Review of Systems:   Constitutional: Denies fever, weight loss, fatigue and night sweats. Neurological: Denies headache, confusion, sleep difficulties, lightheadedness, spinning sensation, vision loss, tremor, weakness, numbness or tingling, incoordination, imbalance, and incontinence of bowel and sexual dysfunction. Psychiatric: Denies anxiety, depression and hallucinations. Eyes: Denies blurred vision, double vision and eye pain. ENMT: Denies difficulty swallowing, dental pain, hearing loss, and tinnitus. Cardiovascular: Denies chest pain and palpitations. Respiratory: Denies shortness of breath. Genitourinary: Denies urinary incontinence and retention. Integumentary: Denies rashes. Endocrine: Denies polydipsia and polyuria.     Past Medical History:   Diagnosis Date    CAD (coronary artery disease)     Cerebral artery occlusion with cerebral infarction ([facility] [address]     Hyperlipidemia     Hypertension     MI, old        Past Surgical History:   Procedure Laterality Date    CORONARY ANGIOPLASTY WITH STENT PLACEMENT      INSERTABLE CARDIAC MONITOR      reveal linq cardiac monitor    IR MECHANICAL ART THROMBECTOMY INTRACRANIAL  4/13/2021    IR MECHANICAL ART THROMBECTOMY INTRACRANIAL 4/13/2021 Mayra Montelongo MD [facility] SPECIAL PROCEDURES    OTHER SURGICAL HISTORY  07/12/2016    Dr. Alatorre Ahr- LINQ insertion       Current Medications   aspirin chewable tablet 81 mg, Daily  enoxaparin (LOVENOX) injection 40 mg, Daily  clopidogrel (PLAVIX) tablet 75 mg, Daily  acetaminophen (TYLENOL) tablet 650 mg, Q4H PRN  famotidine (PEPCID) injection 20 mg, BID  senna (SENOKOT) tablet 8.6 mg, Nightly  polyethylene glycol (GLYCOLAX) packet 17 g, Daily  atorvastatin (LIPITOR) tablet 80 mg, Daily  baclofen (LIORESAL) tablet 10 mg, TID  captopril (CAPOTEN) tablet 50 mg, TID  metoprolol tartrate (LOPRESSOR) tablet 50 mg, BID  sodium chloride flush 0.9 % injection 5-40 mL, 2 times per day  sodium chloride flush 0.9 % injection 5-40 mL, PRN  0.9 % sodium chloride infusion, PRN  ondansetron (ZOFRAN) injection 4 mg, Q6H PRN        Allergies  Allergies   Allergen Reactions    Ciprofloxacin-Ciproflox Hcl Er      \"veins were red and burned\"    Pcn [Penicillins] Hives        Family History  No family history on file. Social History     Tobacco History     Smoking Status  Current Every Day Smoker Smoking Frequency  0.25 packs/day Smoking Tobacco Type  Cigars, Cigarettes    Smokeless Tobacco Use  Never Used          Alcohol History     Alcohol Use Status  Yes Drinks/Week  1 Cans of beer per week Amount  1.0 standard drinks of alcohol/wk Comment  social- mainly summer occasional beer          Drug Use     Drug Use Status  No          Sexual Activity     Sexually Active  Yes Partners  Female                Physical Exam  Vitals:    04/14/21 2000 04/15/21 0000 04/15/21 0549 04/15/21 0730   BP: 133/69 128/75  126/65   Pulse: 86 79  83   Resp: 18 18  17   Temp: 98.7 °F (37.1 °C) 98.1 °F (36.7 °C)  98 °F (36.7 °C)   TempSrc: Temporal Temporal  Temporal   SpO2: 95% 96%     Weight:   268 lb (121.6 kg)    Height:           I/O last 3 completed shifts: In: 10 [I.V.:10]  Out: 2205 [Urine:2205]  I/O this shift:  In: 190 [P.O.:180; I.V.:10]  Out: -     Constitutional: Well developed, well nourished and in no acute distress. Respiratory: Clear to auscultation bilaterally with no use of accessory muscles during respiration. Cardiovascular:  No murmurs auscultated. Carotid arteries without bruits. Pedal pulses and radial pulses 2+ bilaterally. No edema in all four extremities. Mental Status:    Alert and oriented to person, place, and time. Recent and remote memory: Intact  Attention and concentration: Intact  Speech and language : Intact  Fund of knowledge: Intact  Judgement and Insight: Intact    Cranial Nerves:     II: Visual Fields: Full to confrontation bilaterally   III, IV, VI: Pupils: equally round and reactive to light, 3  to 2  mm bilaterally. EOMs: full with no nystagmus. V: Sensation intact to light touch and pin prick sensation bilaterally  VII: left facial droop   VIII: Hearing intact to finger rub bilaterally   IX,X: Palate elevates symmetrically. XI: head turn (sternocleidomastoid) and shoulder shrug (trapezius) 5/5 bilaterally   XII: Tongue is midline upon protrusion    Motor:   Normal tone and bulk. Normal fine finger movements. No pronator drift. No resting tremors, postural tremors or myoclonus. Upper Extremity Right Left  Lower Extremity Right Left  Deltoid              5 4      Hip Flexion             5 4  Biceps              5 4   Hip Extension             5 4  Triceps                        5 4   Hip Adduction             5 4  Wrist Extension 5 4   Knee Extension 5 4  Wrist Flexion             5 4                Knee Flexion 5 4  Index Finger Flexion 5 4  Ankle Dorsiflexion 5 4  Finger Extension 5 4  Ankle Plantarflexion 5 4  APB                        5 4   Extensor Hallucis Longus 5 4             NIH Stroke Scale  NIH Stroke Scale/Score at time of initial evaluation:    1A: Level of Consciousness 0 - alert; keenly responsive   1B: Ask Month and Age 0 - answers both questions correctly   1C:  Tell Patient To Open and Close Eyes, then Hand  Squeeze 0 - performs both tasks correctly   2: Test Horizontal Extraocular Movements 0 - normal   3: Test Visual Fields 0 - no visual loss   4: Test Facial Palsy 1 - minor paralysis (flattened nasolabial fold, asymmetric on smiling)   5A: Test Left Arm Motor Drift 0   5B: Test Right Arm Motor Drift 0 - no drift, limb holds 90 (or 45) degrees for full 10 seconds   6A: Test Left Leg Motor Drift 1   6B: Test Right Leg Motor Drift 0 - no drift; leg holds 30 degree position for full 5 seconds   7: Test Limb Ataxia (FNF/Heel-Shin) 0 - absent   8: Test Sensation 0 - normal; no sensory loss   9: Test Language/Aphasia 0 - no aphasia, normal   10: Test Dysarthria 0 - normal   11: Test Extinction/Inattention 0 - no abnormality   Total 2              Current Functional Status(Modified Arnold Scale):  2-3-he is back to his baseline previous functional MRS scale      Imaging  Xr Hip Left (2-3 Views)    Result Date: 4/13/2021  EXAMINATION: TWO XRAY VIEWS OF THE LEFT HIP 4/13/2021 9:48 am COMPARISON: Left femur dated May 20, 2018 HISTORY: ORDERING SYSTEM PROVIDED HISTORY: fall at home TECHNOLOGIST PROVIDED HISTORY: Reason for exam:->fall at home What reading provider will be dictating this exam?->CRC FINDINGS: Mild osteoarthritis at the left hip associated with acetabular spurring. No fracture or dislocation. Mild acetabular spurring indicating underlying osteoarthritis. Ct Head Wo Contrast    Result Date: 4/14/2021  EXAMINATION: CT OF THE HEAD WITHOUT CONTRAST  4/14/2021 2:16 am TECHNIQUE: CT of the head was performed without the administration of intravenous contrast. Dose modulation, iterative reconstruction, and/or weight based adjustment of the mA/kV was utilized to reduce the radiation dose to as low as reasonably achievable. COMPARISON: 04/13/2021 HISTORY: ORDERING SYSTEM PROVIDED HISTORY: post tPA, rule out hemorrhage TECHNOLOGIST PROVIDED HISTORY: Reason for exam:->post tPA, rule out hemorrhage Has a \"code stroke\" or \"stroke alert\" been called? ->No What reading provider will be dictating this exam?->CRC FINDINGS: Interval development of mild edema with the right M1 segment on the previous study this appears to have progressed to occlusion. Result Date: 2021  Patient MRN:  [id number] : 1950 Age: 66 years Gender: Male Order Date:  2021 EXAM: CTA NECK W CONTRAST, CTA HEAD W CONTRAST NUMBER OF IMAGES:  56 INDICATION:  stroke stroke COMPARISON: 2018 Technique: Low-dose CT  acquisition technique included one of following options; 1 . Automated exposure control, 2. Adjustment of MA and or KV according to patient's size or 3. Use of iterative reconstruction. Contiguous spiral images were obtained in the axial plane, following the administration of intravenous contrast using CT angiographic protocol. Sagittal and coronal images were reconstructed from the axial plane acquisition. Additional MIP reconstructions were presented to aid in the interpretation of this study. Images were obtained from the skull base cranially. There is mild calcified plaque identified in the vessels compatible with atherosclerotic disease. There is aortic arch and great vessels demonstrate mild atherosclerotic disease. The right carotid is unremarkable. The left carotid is unremarkable. The right vertebral artery is unremarkable. The left vertebral artery is unremarkable. The basilar artery is unremarkable. The middle cerebral arteries are abnormal there is a right M1 occlusion The anterior cerebral arteries are unremarkable. The posterior cerebral arteries are unremarkable. 1.  Right M1 occlusion .  2. Estimated stenosis of the proximal right and left internal carotid artery by NASCET criteria is not hemodynamically significant     Ct Brain Perfusion    Addendum Date: 2021    Addendum: Comparison 2018 There appears to be progression when compared to previous    Result Date: 2021  Patient MRN: [id number] : 1950 Age:  66 years Gender: Male Order Date: 2021 Exam: CT BRAIN PERFUSION Number of Images: 333 views Indication:   stroke stroke unremarkable. 1.  Right M1 occlusion . 2. Estimated stenosis of the proximal right and left internal carotid artery by NASCET criteria is not hemodynamically significant     Ir Mechanical Art Thrombectomy Intracranial    Result Date: 4/13/2021  IR MECHANICAL ART THROMBECTOMY INTRACRANIAL PROCEDURE PERFORMED: Cerebral angiogram with mechanical thrombectomy of Right MCA thrombus COMPLETED DATE:  4/13/2021 4:19 AM CLINICAL HISTORY/PRE-PROCEDURE DIAGNOSIS: Acute ischemic stroke. Patient comes in with acute onset of symptoms around 11:30 PM on 4/12/2021. He had a history of right MCA stenosis and was deemed not to be a candidate for intervention at the time. CT perfusion showing salvageable penumbra POST-PROCEDURE DIAGNOSIS: Same COMPARISON: CT angiographic documentation done on same date ANESTHESIA: Conscious sedation and local anesthesia VESSELS CATHETERIZED: 1. Right Internal Carotid Artery 2. Right middle cerebral artery RADIATION EXPOSURE DATA:  598.3 mGy TOTAL FLUOROSCOPY TIME: 6 minutes and 28 seconds CONTRAST USED: 30 mL of Isovue-300 PROCEDURE: Following informed consent, the patient was brought to the angiography suite, both groins are prepped and draped in usual sterile manner. Vascular access was obtained in the right common femoral artery with a 8-German sheath which was connected to continuous heparinized saline flush. A 6-German Neuron MAX guide catheter was prepped and with the support of a diagnostic catheter was brought into the aorta, double flushed and connected to continuous heparinized saline flush. The balloon guide catheter was then telescoped into the Right internal carotid artery. Fluoroscopic imaging performed at that time confirmed the presence of previously documented arterial occlusion. A microcatheter was prepped and with the support of a microwire was navigated across the arterial occlusion.   A ACE 68 aspiration catheter was then deployed into the thrombus and allowed to integrate for approximately 5 to 7 minutes. The device was then retrieved with flow of contrast .  Continuous aspiration was performed during the retrieval process. Postretrieval digital subtraction images were obtained and showed revascularization. STROKE ACUTE TIME STAMPS: Preprocedure NIH stroke scale: 15 Vascular access time: 3:20 AM Time  First Pass: 3:27 AM Time of final recanalization: 3:27 AM Reperfusion grade : TICI 3 Time of vascular closure: 03:33 AM Postprocedure NH stroke scale:10 INTERPRETATION OF IMAGES: 1. Right internal carotid artery injection: Intracranially there is normal opacification of the intracranial portions of the right internal carotid artery. There is abrupt occlusion at the M1 segment of the right middle cerebral artery. There is normal opacification of bilateral anterior cerebral arteries noticed 2. Post thrombectomy right internal carotid artery injections: Post Thrombectomy, there is complete recanalization of the right middle cerebral artery and its branches. There is no residual stenosis seen compared to prior CTA 2018. 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 IMPRESSION/RECOMMENDATIONS: Right middle cerebral artery occlusion status post successful mechanical thrombectomy. There is no residual stenosis seen compared to prior CTA 2018 Postprocedure neurological and hemodynamic monitoring was recommended. Patients: If you have questions regarding some of the verbiage in your report, please visit [email or web address] for a definition. If you have any other questions, please contact your physician.        Labs:  CBC:   Lab Results   Component Value Date    WBC 10.8 04/14/2021    RBC 3.35 04/14/2021    HGB 9.7 04/14/2021    HCT 30.1 04/14/2021    MCV 89.9 04/14/2021    MCH 29.0 04/14/2021    MCHC 32.2 04/14/2021    RDW 15.3 04/14/2021     04/14/2021    MPV 10.7 04/14/2021     CMP:    Lab Results   Component Value Date     04/15/2021    K 4.0 04/15/2021    K 4.4 04/14/2021     04/15/2021    CO2 27 04/15/2021    BUN 10 04/15/2021    CREATININE 0.8 04/15/2021    GFRAA >60 04/15/2021    LABGLOM >60 04/15/2021    GLUCOSE 98 04/15/2021    GLUCOSE 90 03/28/2012    PROT 5.5 04/14/2021    LABALBU 3.5 04/14/2021    CALCIUM 8.1 04/15/2021    BILITOT 0.4 04/14/2021    ALKPHOS 59 04/14/2021    AST 22 04/14/2021    ALT 17 04/14/2021     HgBA1c:    Lab Results   Component Value Date    LABA1C 5.3 04/13/2021     FLP:    Lab Results   Component Value Date    TRIG 55 04/13/2021    HDL 49 04/13/2021    LDLCALC 33 04/13/2021    LABVLDL 11 04/13/2021

## 2021-04-15 NOTE — PROGRESS NOTES
Cardiac monitor removed, cleaned, put in storage. Nurse to nurse called to [facility].

## 2021-04-15 NOTE — PROGRESS NOTES
Glasses:yes                                                        Hearing: min [place]     UE Assessment:  Hand Dominance: Right [x]? Left []?       ROM Strength STM goal: PRN   RUE  WFL 4/5        LUE PROM WFL  A/AROM: grossly 90 shoulder; WFL distal with impaired [facility] skills/hand manipulation skills. 3-/5 shoulder  3+/5 elbow  3-/5 wrist  3+/5 hand    WFL; 4-.5     Pt to use L UE as fair functional assist during ADLS and transfers.          Sensation: No c/o numbness or tingling in extremities   Tone: WNL   Edema: min edema L hand; moderate L foot     Functional Assessment    Initial Eval Status  Date: 4/13 Treatment Status  Date:  4/15/21 STG=LTG  7-14  days    Feeding S; set up  Set up                                      Jose Elias  while seated up in chair to increase activity tolerance & L UE hand function.            Grooming Mod A Min A   washing off face & hands, brushing hair seated at EOB                       Min A   while standing sink level & demonstrating G knowledge of compensatory techniques; using L UE as fair functional assist.      UB dressing/bathing Mod A  Mod A  Doff/donning gown seated at EOB, Fair recall of modified techniques due to L UE weakness                       Min A  demonstrating G initiation and follow through of nella dressing techniques and requiring min  cues for L sided body awareness during tasks.          LB dressing/bathing Max A  seated in chair; limited by decreased L UE awareness/ decreased [facility] skills and control.  Mod/Max A  Simulated pants , pt able to wolf slip on shoes SBA                       Min A   using AE as needed for safe reach/ energy conservation        Toileting Dep Dep                       Min A      Bed Mobility  Supine to sit: Mod A with HOB elevated; mod cues for technique     Sit to supine: NT Min A rolling  Mod A  Supine < > sit                           SBA  in prep of ADL tasks & transfers   Functional Transfers Sit to stand:  Mod A     Stand Treatment Time Out: 11:20             Treatment Charges: Mins Units   Ther Ex  43134     Manual Therapy 72122     Thera Activities 91894 25 2   ADL/Home Mgt 31967 15 1   Neuro Re-ed 47792     Group Therapy      Orthotic manage/training  29893     Non-Billable Time     Total Timed Treatment 40 3       Nayely MONCADA  [address] [place] [address] Drive, [address] [place] [address]

## 2021-04-15 NOTE — LETTER
Date: 4/23/2021    Dear Mr. Kayleigh Bishop:    Recently you completed a stay in the Acute Rehab Unit at [place] [address]. We hope that you are continuing to make progress following your stroke. Now that you are back in your home, questions may arise and you may appreciate the opportunity to speak with one of our health professionals or other stroke survivors and their families. The [facility] Stroke [facility] [[email or web address] [employer] MEETINGS VIA ZOOM BEGIN AT 4PM AND TYPICALLY CONCLUDE BY 5:30PM.  The meeting is a good opportunity for stroke survivors and their families to mix and mingle with other stroke survivors and family members. January off  February 4  March 4  April 1  May 6  Luz 3  July off  August 5   September 2  October 7  November 4   December 2    We would love to have you attend virtually thru Zoom until further notice. Please call me at [phone] OR email me: [email or web address] in order to confirm your attendance and for assistance setting up Zoom. Sincerely,    Rosa Rapp MSN, APRN, FNP-C     [address]   [facility] [place], [address] [place] [address]

## 2021-04-16 LAB
ALBUMIN SERPL-MCNC: 3.4 G/DL (ref 3.5–5.2)
ALP BLD-CCNC: 55 U/L (ref 40–129)
ALT SERPL-CCNC: 16 U/L (ref 0–40)
ANION GAP SERPL CALCULATED.3IONS-SCNC: 8 MMOL/L (ref 7–16)
AST SERPL-CCNC: 20 U/L (ref 0–39)
BASOPHILS ABSOLUTE: 0.03 E9/L (ref 0–0.2)
BASOPHILS RELATIVE PERCENT: 0.3 % (ref 0–2)
BILIRUB SERPL-MCNC: 0.6 MG/DL (ref 0–1.2)
BUN BLDV-MCNC: 11 MG/DL (ref 8–23)
CALCIUM SERPL-MCNC: 8.4 MG/DL (ref 8.6–10.2)
CHLORIDE BLD-SCNC: 99 MMOL/L (ref 98–107)
CO2: 28 MMOL/L (ref 22–29)
CREAT SERPL-MCNC: 0.9 MG/DL (ref 0.7–1.2)
EOSINOPHILS ABSOLUTE: 0.18 E9/L (ref 0.05–0.5)
EOSINOPHILS RELATIVE PERCENT: 1.8 % (ref 0–6)
FERRITIN: 28 NG/ML
FOLATE: 17 NG/ML (ref 4.8–24.2)
GFR AFRICAN AMERICAN: >60
GFR NON-AFRICAN AMERICAN: >60 ML/MIN/1.73
GLUCOSE BLD-MCNC: 86 MG/DL (ref 74–99)
HCT VFR BLD CALC: 30.9 % (ref 37–54)
HEMOGLOBIN: 9.9 G/DL (ref 12.5–16.5)
IMMATURE GRANULOCYTES #: 0.05 E9/L
IMMATURE GRANULOCYTES %: 0.5 % (ref 0–5)
INR BLD: 1.2
IRON SATURATION: 12 % (ref 20–55)
IRON: 43 MCG/DL (ref 59–158)
LYMPHOCYTES ABSOLUTE: 3.87 E9/L (ref 1.5–4)
LYMPHOCYTES RELATIVE PERCENT: 37.7 % (ref 20–42)
MCH RBC QN AUTO: 29 PG (ref 26–35)
MCHC RBC AUTO-ENTMCNC: 32 % (ref 32–34.5)
MCV RBC AUTO: 90.6 FL (ref 80–99.9)
MONOCYTES ABSOLUTE: 0.99 E9/L (ref 0.1–0.95)
MONOCYTES RELATIVE PERCENT: 9.6 % (ref 2–12)
NEUTROPHILS ABSOLUTE: 5.14 E9/L (ref 1.8–7.3)
NEUTROPHILS RELATIVE PERCENT: 50.1 % (ref 43–80)
PDW BLD-RTO: 15 FL (ref 11.5–15)
PLATELET # BLD: 224 E9/L (ref 130–450)
PMV BLD AUTO: 10.7 FL (ref 7–12)
POTASSIUM REFLEX MAGNESIUM: 3.7 MMOL/L (ref 3.5–5)
PROTHROMBIN TIME: 12.7 SEC (ref 9.3–12.4)
RBC # BLD: 3.41 E12/L (ref 3.8–5.8)
SODIUM BLD-SCNC: 135 MMOL/L (ref 132–146)
TOTAL IRON BINDING CAPACITY: 364 MCG/DL (ref 250–450)
TOTAL PROTEIN: 5.6 G/DL (ref 6.4–8.3)
VITAMIN B-12: 428 PG/ML (ref 211–946)
WBC # BLD: 10.3 E9/L (ref 4.5–11.5)

## 2021-04-16 PROCEDURE — 92610 EVALUATE SWALLOWING FUNCTION: CPT

## 2021-04-16 PROCEDURE — 6360000002 HC RX W HCPCS: Performed by: INTERNAL MEDICINE

## 2021-04-16 PROCEDURE — 82607 VITAMIN B-12: CPT

## 2021-04-16 PROCEDURE — 2580000003 HC RX 258

## 2021-04-16 PROCEDURE — 92523 SPEECH SOUND LANG COMPREHEN: CPT

## 2021-04-16 PROCEDURE — 97535 SELF CARE MNGMENT TRAINING: CPT

## 2021-04-16 PROCEDURE — 83540 ASSAY OF IRON: CPT

## 2021-04-16 PROCEDURE — 97530 THERAPEUTIC ACTIVITIES: CPT

## 2021-04-16 PROCEDURE — 82746 ASSAY OF FOLIC ACID SERUM: CPT

## 2021-04-16 PROCEDURE — 97112 NEUROMUSCULAR REEDUCATION: CPT

## 2021-04-16 PROCEDURE — 6370000000 HC RX 637 (ALT 250 FOR IP): Performed by: INTERNAL MEDICINE

## 2021-04-16 PROCEDURE — 92526 ORAL FUNCTION THERAPY: CPT

## 2021-04-16 PROCEDURE — 97166 OT EVAL MOD COMPLEX 45 MIN: CPT

## 2021-04-16 PROCEDURE — 80053 COMPREHEN METABOLIC PANEL: CPT

## 2021-04-16 PROCEDURE — 85025 COMPLETE CBC W/AUTO DIFF WBC: CPT

## 2021-04-16 PROCEDURE — 97110 THERAPEUTIC EXERCISES: CPT

## 2021-04-16 PROCEDURE — 1280000000 HC REHAB R&B

## 2021-04-16 PROCEDURE — 83550 IRON BINDING TEST: CPT

## 2021-04-16 PROCEDURE — 97162 PT EVAL MOD COMPLEX 30 MIN: CPT

## 2021-04-16 PROCEDURE — 36415 COLL VENOUS BLD VENIPUNCTURE: CPT

## 2021-04-16 PROCEDURE — 97129 THER IVNTJ 1ST 15 MIN: CPT

## 2021-04-16 PROCEDURE — 85610 PROTHROMBIN TIME: CPT

## 2021-04-16 PROCEDURE — 82728 ASSAY OF FERRITIN: CPT

## 2021-04-16 RX ORDER — KETOROLAC TROMETHAMINE 30 MG/ML
15 INJECTION, SOLUTION INTRAMUSCULAR; INTRAVENOUS NIGHTLY
Status: DISCONTINUED | OUTPATIENT
Start: 2021-04-16 | End: 2021-04-17

## 2021-04-16 RX ORDER — FLUTICASONE PROPIONATE 50 MCG
2 SPRAY, SUSPENSION (ML) NASAL DAILY
Status: DISCONTINUED | OUTPATIENT
Start: 2021-04-16 | End: 2021-04-30 | Stop reason: HOSPADM

## 2021-04-16 RX ORDER — LOSARTAN POTASSIUM 25 MG/1
25 TABLET ORAL DAILY
Status: DISCONTINUED | OUTPATIENT
Start: 2021-04-17 | End: 2021-04-19

## 2021-04-16 RX ORDER — SODIUM CHLORIDE 0.9 % (FLUSH) 0.9 %
SYRINGE (ML) INJECTION
Status: COMPLETED
Start: 2021-04-16 | End: 2021-04-16

## 2021-04-16 RX ADMIN — ATORVASTATIN CALCIUM 80 MG: 80 TABLET, FILM COATED ORAL at 08:24

## 2021-04-16 RX ADMIN — ASPIRIN 81 MG CHEWABLE TABLET 81 MG: 81 TABLET CHEWABLE at 08:23

## 2021-04-16 RX ADMIN — SODIUM CHLORIDE, PRESERVATIVE FREE 10 ML: 5 INJECTION INTRAVENOUS at 20:42

## 2021-04-16 RX ADMIN — METOPROLOL TARTRATE 50 MG: 50 TABLET, FILM COATED ORAL at 20:42

## 2021-04-16 RX ADMIN — BACLOFEN 10 MG: 10 TABLET ORAL at 13:59

## 2021-04-16 RX ADMIN — METOPROLOL TARTRATE 50 MG: 50 TABLET, FILM COATED ORAL at 08:23

## 2021-04-16 RX ADMIN — POLYETHYLENE GLYCOL 3350 17 G: 17 POWDER, FOR SOLUTION ORAL at 08:24

## 2021-04-16 RX ADMIN — BACLOFEN 10 MG: 10 TABLET ORAL at 08:23

## 2021-04-16 RX ADMIN — CLOPIDOGREL 75 MG: 75 TABLET, FILM COATED ORAL at 08:23

## 2021-04-16 RX ADMIN — FLUTICASONE PROPIONATE 2 SPRAY: 50 SPRAY, METERED NASAL at 08:21

## 2021-04-16 RX ADMIN — STANDARDIZED SENNA CONCENTRATE 8.6 MG: 8.6 TABLET ORAL at 20:42

## 2021-04-16 RX ADMIN — BACLOFEN 10 MG: 10 TABLET ORAL at 20:42

## 2021-04-16 RX ADMIN — CAPTOPRIL 50 MG: 12.5 TABLET ORAL at 08:23

## 2021-04-16 RX ADMIN — ENOXAPARIN SODIUM 40 MG: 40 INJECTION SUBCUTANEOUS at 08:24

## 2021-04-16 ASSESSMENT — PAIN DESCRIPTION - PROGRESSION: CLINICAL_PROGRESSION: NOT CHANGED

## 2021-04-16 ASSESSMENT — PAIN DESCRIPTION - ORIENTATION: ORIENTATION: LEFT

## 2021-04-16 ASSESSMENT — PAIN SCALES - GENERAL: PAINLEVEL_OUTOF10: 3

## 2021-04-16 NOTE — CARE COORDINATION
Social Work Assessment Summary    PCP: Stephanie    Patient Resides: With spouse and 3 small dogs    Home Architecture : [employer]. Front has 3 steps to enter with bilateral rails. Side has ramp access. 2 bathrooms; 1 has a tub/shower combo with shower curtains, 2nd has a walk-in shower. Will you return to your own home? yes        Primary Caregiver: Spouse, Marielle Perkins (64), retired, is healthy and can drive. Pt has 1 daughter; Pedro Peterson (39), lives locally. Level of Function PTA : Independent in all, but ambulated with a cane and wwalker. Employment: Retired from [employer] no     DME Pta  : [facility] [place], David Villarreal, 3-in-1 bedside commode, shower chair, hospital bed    wutabout Involvement PTA : Received therapy at [facility]) after his first stroke in 2020    Do they have a medical profile: no    Family Teaching: TBD    Strength: Strong willed and persistent    Preference: \"Be able to get out of bed on my own and go to the bathroom on my own. \"      NAME RELATION HOME # WORK # CELL #   Marielle Perkins spouse [phone]                     Height: 6'1  Weight: New Abi SW Gaby Ibarra Sender  4/16/2021

## 2021-04-16 NOTE — PROGRESS NOTES
Speech Language Pathology  ACUTE REHABILITATION--DAILY PROGRESS NOTE            SUMMARY OF EVALUATION    ADMITTING DIAGNOSIS: Ischemic cerebral vascular accident (CVA) due to stenosis of large extracranial artery (HCC) [I63.20]  Ischemic stroke ([facility] [address] [I63.9]                 DYSPHAGIA DIAGNOSIS:  Mild oral stage dysphagia attributed to mild left labiobuccal and lingual weakness and resolving xerostomia. Pharyngeal stage appears functional.                            DIET RECOMMENDATIONS:  Continue Dysphagia 3, Soft/advanced (Soft & Bite-sized) solids with thin liquids. SLP will trial regular consistency foods tomorrow morning, 4/17.                 FEEDING RECOMMENDATIONS:                           Assistance level:  No assistance needed. Supervision by SLP is required with PO x2-3.                             Compensatory strategies recommended: Small bites/sips     THERAPY RECOMMENDATIONS:                  Dysphagia therapy is recommended 3-5 times per week for LOS or when goals are met. Pt will complete PO trials of upgraded diet textures with SLP only to determine the least restrictive PO diet to maintain adequate nutrition/hydration with no more than 1 overt s/s of pen/asp. Meal endurance analysis 2-3 sessions to provide diet modification and compensatory strategy implementation due to inconsistent episodes of clinical indicators of dysphagia during intake.     SPEECH PATHOLOGY DIAGNOSIS:    Moderate STM deficit. Mild overall cognitive deficit.  Mild dysarthria with left labiobuccal and lingual weakness.      THERAPY RECOMMENDATIONS:   Speech Pathology intervention is recommended 3-6 times per week for LOS or when goals are met with emphasis on the following:      Improve STM recall, sequencing and problem solving for increased functional independence with completion of ADLs/IADLs to a level commensurate with supervision.     Increase speech intelligibility by               -- improving strength/ROM of the

## 2021-04-16 NOTE — PROGRESS NOTES
Occupational Therapy   Occupational Therapy Initial Assessment  Date: 2021   Patient Name: Ira Morse  MRN: [id number]     : 1950  Room: 5501B    Referring Practitioner: Karon Morel MD  Diagnosis: CVA- RMCA  Additional Pertinent Hx: hx CVA 3 years ago, CAD, HTN, HLD, hx angio with stents  Restrictions: Fall Risk & min [place]    Date of Service: 2021    Discharge Recommendations:  Home with assist PRN, Home with nursing aide, Home with Home health OT     Subjective   Chart Reviewed: Yes  Family / Caregiver Present: No  Subjective: Pt presents supine in bed & was agreeable to OT intervention. Pain Comments: Pt did c/o L hip pain 8/10- Pt fell on left when he had his stroke    Social/Functional History  Lives With: Spouse  Type of Home: House  Home Layout: One level  Home Access: Ramped entrance, Stairs to enter with rails  Entrance Stairs - Number of Steps: 3 [place] BHR front of house- Pt stated he uses ramp while using his motorized scooter  Bathroom Shower/Tub: Walk-in shower, Shower chair with back  Bathroom Toilet: Bedside commode  Bathroom Equipment: Grab bars in shower  Home Equipment: Rolling walker, Cane, Electric scooter  ADL Assistance: Needs assistance- Per chart pt wife occasionaly assists with ADLs  Ambulation Assistance: Independent  Transfer Assistance: Independent  Active : Yes  Mode of Transportation: Car  Occupation: Retired  Type of occupation: [employer]  IADL Comments: PLOF per chart- Pt wife assisted with ADLs & IADLs as needed     Objective   Vision: Impaired  Vision Exceptions: Wears glasses at all times  Hearing: Exceptions to [facility]  Hearing Exceptions: Hard of hearing/hearing concerns      Orientation  Overall Orientation Status: Within Functional Limits     Observation/Palpation  Posture: Fair     Balance  Sitting Balance: Stand by assistance  Standing Balance:  Moderate assistance  Functional Mobility  Functional - Mobility Device: Platform rollator  Activity: Other  Assist Level: Moderate assistance  Functional Mobility Comments: vc's for hand placement & safety  Toilet Transfers  Toilet - Technique: Stand pivot  Equipment Used: Raised toilet seat with rails  Toilet Transfer: Moderate assistance  Toilet Transfers Comments: vc's for hand placement & safety     ADL  Feeding: Minimal assistance;Setup  Grooming: Minimal assistance; Increased time to complete;Setup  UE Bathing: Minimal assistance; Increased time to complete;Verbal cueing;Setup  LE Bathing: Moderate assistance; Increased time to complete;Verbal cueing;Setup  UE Dressing: Minimal assistance; Increased time to complete;Verbal cueing;Setup  LE Dressing: Maximum assistance;Setup;Verbal cueing; Increased time to complete(Pt require assist to thread depends & shorts & assist to pull over his hips. Pt require assist to don long socks & to don L croc)  Toileting: Maximum assistance  Additional Comments: vc's for hand placement     Coordination  Movements Are Fluid And Coordinated: No  Coordination and Movement description: Fine motor impairments;Gross motor impairments; Left UE     Bed mobility  Supine to Sit: Minimal assistance  Scooting: Contact guard assistance  Comment: vc's for hand placement & safety   Transfers  Stand Pivot Transfers: Moderate assistance  Sit to stand: Minimal assistance  Stand to sit: Minimal assistance  Transfer Comments: Increased time to move & vc's for hand placement/safety     Vision - Basic Assessment  Prior Vision: Wears glasses all the time  Visual History: No significant visual history  Patient Visual Report: No visual complaint reported.      Cognition  Overall Cognitive Status: Exceptions  Arousal/Alertness: Delayed responses to stimuli  Following Commands: followed a 2/3 step command  Problem Solving: Assistance required to generate solutions;Assistance required to correct errors made  Insights: Decreased awareness of deficits  Initiation: Requires cues for some  Sequencing: Requires cues for some Sensation  Overall Sensation Status: grossly intact      LUE PROM: 3/4 in all planes  LUE AROM: <3/4 in all planes & LUE lags behind RUE  RUE AROM : WFL  Right Hand AROM: WFL    LUE Strength  L Hand: 3-/5  LUE Strength Comment: 3-/5 shoulder, elbow & 2+/5 wrist  RUE Strength  R Hand: 4/5  RUE Strength Comment: 4/5 strength shoulder, elbow & wrist     Hand Dominance: Right    Left Hand Strength -    Handle Setting 2: 40# & norm for pt age & gender is 65#  Right Hand Strength -    Handle Setting 2: 62# & norm for pt age & gender is 75#    Fine Motor Skills  Left 9-Hole Peg Test: 1 minute & .5 sec & norm for pt age & gender is 21.4 sec  Right 9-Hole Peg Test: 34.2 sec & norm for pt age & gender is 19.9 sec       Plan   Times per week: OT twice a dy for 2-3 weeks to address above problem areas  Times per day: Twice a day  Current Treatment Recommendations: Strengthening, Gait Training, Patient/Caregiver Education & Training, Home Management Training, ROM, Equipment Evaluation, Education, & procurement, Balance Training, Neuromuscular Re-education, Functional Mobility Training, Cognitive Reorientation, Positioning, Endurance Training, Safety Education & Training, Self-Care / ADL    Assessment   Performance deficits / Impairments: Decreased functional mobility ; Decreased endurance;Decreased coordination;Decreased ADL status; Decreased posture;Decreased balance;Decreased ROM; Decreased strength;Decreased safe awareness;Decreased high-level IADLs;Decreased cognition;Decreased fine motor control  Prognosis: Good  Decision Making: Medium Complexity  OT Education: OT Role;Plan of Care;Equipment;Precautions; ADL Adaptive Strategies;Transfer Training;Energy Conservation  Patient Education: Pt educated with regards to OT process. Pt participated in OT goal planning. Pt pleasant & cooperative throughout the OT session.  Pt pleasant & cooperative throughout the OT session  REQUIRES OT FOLLOW UP: Yes  Activity Tolerance:

## 2021-04-16 NOTE — PATIENT CARE CONFERENCE
[address] [place], [place] [address] ACUTE REHABILITATION  TEAM CONFERENCE NOTE/PATIENT PLAN OF CARE    Date: 2021  Admission date: 4/15/2021  Patient Name: Pérez Thompson        MRN: [id number]    : 1950  (72 y.o.)  Gender: male   Rehab diagnosis/surgery with date:  Right MCA stroke/thrombectomy done 21  Impairment Group Code:  1.1      MEDICAL/FUNCTIONAL HISTORY/STATUS:  successful thrombectomy done in interventional radiology, was independent prior to event, has left side weakness, dysphagia, has history of CVA in  and went to SNF    Consultations/Labs/X-rays: internal medicine to follow for medical management      MEDICATION UPDATE:  see MAR      NURSING :    Bowel:   Always Continent  [x]   Occasionally incontinent  []   Frequently incontinent  []   Always incontinent  []   Not occurred  []     Bladder:   Always Continent  [x]    Incontinent less than daily[]   Incontinent  daily []   Always incontinent  []   No urine output    []   Indwelling catheter []       Toilet Hygiene:   Current level : max assist  Short term Toilet hygiene goal: min assist  Long term toilet hygiene goal:  supervision    Skin integrity: intact  Pain: none    NUTRITION    Diet  soft and bite sized, carb controlled, low sodium  Liquid consistency   thin    SOCIAL INFORMATION:  Lives with: spouse  Prior community services:  none  Home Architecture:  ranch with 3 entry steps, 2 rails, side entrance has a ramp  Prior Level of function:  independent with cane or wheeled walker  DME:  wheelchair, quad cane , wheeled walker, scooter, shower chair, hospital bed, 3 in 1 commode    FAMILY / PATIENT EDUCATION:  safety and self care will be ongoing with patient    PHYSICAL THERAPY-not yet evaluated at time of team conference but felt to be a good candidate based on pre-admission assessment      OCCUPATIONAL THERAPY:      Tub/shower:   Current level: to be assessed      Feeding:  Current level: min assist  Short term feeding

## 2021-04-16 NOTE — PROGRESS NOTES
Department of Internal Chela Hobson M.D.  Progress Note      SUBJECTIVE: Reports he had pain sleeping with the hematoma on the left leg will try Toradol tonight but use this sparingly he also has sinus congestion and allergies and will try the Flonase    OBJECTIVE soft and nontender    Medications    Current Facility-Administered Medications: ketorolac (TORADOL) injection 15 mg, 15 mg, Intravenous, Nightly  fluticasone (VERAMYST) nasal spray 2 spray, 2 spray, Each Nostril, Daily  acetaminophen (TYLENOL) tablet 650 mg, 650 mg, Oral, Q4H PRN  aspirin chewable tablet 81 mg, 81 mg, Oral, Daily  atorvastatin (LIPITOR) tablet 80 mg, 80 mg, Oral, Daily  baclofen (LIORESAL) tablet 10 mg, 10 mg, Oral, TID  captopril (CAPOTEN) tablet 50 mg, 50 mg, Oral, TID  clopidogrel (PLAVIX) tablet 75 mg, 75 mg, Oral, Daily  enoxaparin (LOVENOX) injection 40 mg, 40 mg, Subcutaneous, Daily  metoprolol tartrate (LOPRESSOR) tablet 50 mg, 50 mg, Oral, BID  polyethylene glycol (GLYCOLAX) packet 17 g, 17 g, Oral, Daily  senna (SENOKOT) tablet 8.6 mg, 1 tablet, Oral, Nightly  magnesium hydroxide (MILK OF MAGNESIA) 400 MG/5ML suspension 30 mL, 30 mL, Oral, Daily PRN  Physical    VITALS:  /71   Pulse 80   Temp 97.6 °F (36.4 °C) (Oral)   Resp 16   Ht 6' 1\" (1.854 m)   Wt 270 lb 6.4 oz (122.7 kg)   BMI 35.67 kg/m²   24HR INTAKE/OUTPUT:  No intake or output data in the 24 hours ending 04/16/21 0740  LUNGS:  No increased work of breathing, good air exchange, clear to auscultation bilaterally, no crackles or wheezing  CARDIOVASCULAR:  Normal apical impulse, regular rate and rhythm, normal S1 and S2, no S3 or S4, and no murmur noted  Data    CBC with Differential:    Lab Results   Component Value Date    WBC 10.3 04/16/2021    RBC 3.41 04/16/2021    HGB 9.9 04/16/2021    HCT 30.9 04/16/2021     04/16/2021    MCV 90.6 04/16/2021    MCH 29.0 04/16/2021    MCHC 32.0 04/16/2021    RDW 15.0 04/16/2021

## 2021-04-16 NOTE — H&P
PM&R Admission History & Physical Examination    Patient: Allyssa Lynn  Age/sex: 66 y.o. male  Medical Record #: [id number]  Admission to Acute Rehab Unit: 04/16/2021    Consulting physician: Pricilla Conner - neurology  Primary care provider: Evelin Pérez MD    Procedures performed on/related to this admission:  None    Chief complaint:   Impairments and acitivities limitations in ADLs, mobility, functional communication, and cognition secondary to stroke with (L) hemibody involvement    HPI:   Allyssa Lynn is a 66 y.o. male with complicated  past medical history including previous stroke affecting his (R) side who presented 4/13/2021 to [address] [place] [address] with the acute onset of (R) flaccid paralysis. He did receive tPA and underwent emergent thrombectomy of the (R) MCA. His hospital course was complicated by epistaxis which did resolve. His BP was controlled. He had chronic back pain improved with baclofen. He now presents to the inpatient rehab unit to increase his level of function, independence, safety and mobility. He is seeing some improvement in his (L) sided strength. Denies diplopia. No difficulty swallowing per his report. No numbness or tingling. He is to continue DAPT for 21 days then transition to Plavix monotherapy per neurology. Present status: He is currently requiring assist with mobility and is stable from a neuro standpoint and able to participate in the rehab program.  Pain: chronic back pain but otherwise OK  PO intake: tolerating current PO diet. BM: Some degree of constipation  Micturition: voiding without difficulty. DVT prophylaxis with Lovenox.    Weight bearing status: WBAT      Past Medical History:   Diagnosis Date    CAD (coronary artery disease)     Cerebral artery occlusion with cerebral infarction ([facility] [address]     Hyperlipidemia     Hypertension     MI, old        Past Surgical History:   Procedure Laterality Date    CORONARY ANGIOPLASTY WITH STENT PLACEMENT      INSERTABLE CARDIAC MONITOR      reveal linq cardiac monitor    IR MECHANICAL ART THROMBECTOMY INTRACRANIAL  4/13/2021    IR MECHANICAL ART THROMBECTOMY INTRACRANIAL 4/13/2021 Malik Michael MD [facility] PROCED[facility]    OTHER SURGICAL HISTORY  07/12/2016    Dr. Stephanie Sultana insertion       History reviewed. No pertinent family history. Allergies   Allergen Reactions    Ciprofloxacin-Ciproflox Hcl Er      \"veins were red and burned\"    Pcn [Penicillins] Hives       Scheduled Meds:  ketorolac, 15 mg, Nightly  fluticasone, 2 spray, Daily  aspirin, 81 mg, Daily  atorvastatin, 80 mg, Daily  baclofen, 10 mg, TID  captopril, 50 mg, TID  clopidogrel, 75 mg, Daily  enoxaparin, 40 mg, Daily  metoprolol tartrate, 50 mg, BID  polyethylene glycol, 17 g, Daily  senna, 1 tablet, Nightly        PRN Meds  acetaminophen, 650 mg, Q4H PRN  magnesium hydroxide, 30 mL, Daily PRN        Home Medications: Reviewed and to continue medications as discharged from acute. Social History:  Tobacco/EtOh/Illicits: smoker but states he is going to quit. No longer drinks EtOH  Working History: retired  Social supports: Lives with wife  Home situation: Lives in 1 story home with ramp or 2 stairs to enter (with bilateral rails), and no stairs to B/B. Has WC, Sharri Wilhelmer, Segundoter, 3-in-1 bedside commode, shower chair, hospital bed but was not using equipment prior to admission per his report. Functional History:  Home DME: Choco MALDONADO WWalker, Scooter, 3-in-1 bedside commode, shower chair, hospital bed but was not using equipment prior to admission per his report. Premorbid ADL's: Indpendent  Premorbid Mobility: Independent   Day of Admission:    Physical Therapy :    Initial Evaluation  4/16/21          Short Term Goals Long Term Goals    Was pt agreeable to Eval/treatment? yes Initial Evaluation Initial Evaluation       Does pt have pain?  L hip pain - hematoma from a fall at home           Bed Mobility Rolling: SBA  Supine to sit: SBA  Sit to supine: SBA  Scooting: SBA     Supervision Independent   Transfers Sit to stand: Mod A  Stand to sit: Mod A  Stand pivot: Min A with [facility] and LOLI Brennan     Min A Supervision   Ambulation   50 feet with UpWalker with Min A     150 feet with AAD with SBA >150 feet with AAD with Supervision   Walking 10 feet on uneven surface 10 feet with UpWalker with Mod A     10 feet with AAD with Min A 10 feet with AAD with Supervision   Wheel Chair Mobility 25 feet Max A     50 feet  feet Mod I   Car Transfers NT - deferred today due to pt's difficulty with STS from high chair     Min A Supervision   Stair negotiation: ascended and descended 1 step x2 reps with B rails with Min A - backwards descending     4 steps with B rail with Min A 4 steps with B rail with Supervision   Curb Step:   ascended and descended NT           Picking up object off the floor Picked up a cone with Min A     Will  an object with SBA Will  an object with Supervision   BLE ROM WFL           BLE Strength RLE 5/5  LLE 4-/5           Balance  Sitting: Supervision  Standing: Min/Mod A                 Occupational Therapy :  Balance  Sitting Balance: Stand by assistance  Standing Balance: Moderate assistance  Functional Mobility  Functional - Mobility Device: Platform rollator  Activity: Other  Assist Level: Moderate assistance  Functional Mobility Comments: vc's for hand placement & safety  Toilet Transfers  Toilet - Technique: Stand pivot  Equipment Used: Raised toilet seat with rails  Toilet Transfer: Moderate assistance  Toilet Transfers Comments: vc's for hand placement & safety      ADL  Feeding: Minimal assistance;Setup  Grooming: Minimal assistance; Increased time to complete;Setup  UE Bathing: Minimal assistance; Increased time to complete;Verbal cueing;Setup  LE Bathing: Moderate assistance; Increased time to complete;Verbal cueing;Setup  UE Dressing: Minimal assistance; Increased time to 6--Modified Independent              Receptive                          Current Status             5--Supervision               Short Term Goal         6--Modified Independent                       Long Term Goal          6--Modified Independent              Expressive                          Current Status             5--Supervision               Short Term Goal         6--Modified Independent                       Long Term Goal          6--Modified Independent              Social Interaction                          Current Status             4--Minimal Assistance               Short Term Goal         5--Supervision               Long Term Goal          5--Supervision                                                     Problem Solving                          Current Status             4--Minimal Assistance               Short Term Goal         5--Supervision               Long Term Goal          5--Supervision                  Memory                          Current Status             3--Moderate Assistance                        Short Term Goal         4--Minimal Assistance               Long Term Goal          5--Supervision                                  Review Of Systems:   Constitutional: No Fever, chills  Skin: No rash, ulcers, or other lesions  HEENT: No HA, blurry vision  Respiratory: No Cough, SOB  Cardiovascular: No CP  Gastrointestinal: No nausea, vomiting, diarrhea, constipation, abdominal discomfort; + continent   Genitourinary: No Dysuria, frequency, urgency; + continent   Musculoskeletal:  as per HPI  Neurologic:  as per HPI  Psychiatric: No depression, No anxiety    Physical Examination:  Vitals:   Vitals:    04/15/21 1846 04/15/21 2105 04/16/21 0730   BP: 127/63 135/71 130/74   Pulse: 83 80 82   Resp: 18 16 18   Temp: 98.1 °F (36.7 °C) 98 °F (36.7 °C) 97.6 °F (36.4 °C)   TempSrc: Oral Temporal Oral   Weight: 270 lb 6.4 oz (122.7 kg)     Height: 6' 1\" (1.854 m)         GEN: NAD, conversational left carotid is unremarkable. The right vertebral artery is unremarkable. The left vertebral artery is unremarkable. The basilar artery is unremarkable. The middle cerebral arteries are abnormal there is a right M1   occlusion    The anterior cerebral arteries are unremarkable. The posterior cerebral arteries are unremarkable.            Impression   1.  Right M1 occlusion . 2. Estimated stenosis of the proximal right and left internal carotid   artery by NASCET criteria is not hemodynamically significant             IMPRESSION:  Lary Saldana is a 66 y.o. right handed male with PMH significant for previous stroke , who presented on 4/13/2021 s/p recurrent thrombotic stroke with acute (L) nella. Patient has impairments in:  cognition, impaired communication, impaired strength, impaired ROM, impaired balance, impaired safety awareness, decreased endurance. Patient has activities limitations in self care, mobility, functional communication and cognition, and is admitted to [facility] for acute comprehensive rehabilitation. I. Rehabilitation Necessity/Diagnosis:   Medical impact on function as a result of impaired functional mobility, ambulation, bed mobility, transfers, self-care/ADL's, strength, endurance, range of motion/flexibility, coordination and impaired gait/balance. Treatment plan will include a comprehensive rehabilitation program with intense therapies for 3 hours/day, 5 days/week. 1. Physical therapy to address bed mobidility, car and mat transfer, progressive ambulation with use of least restrictive assistive device, optimization of gait biomechanics, truncal strengthening, lower extremity strengthening, coordination, range of motion/flexibility, wheelchair and cushion evaluation, durable medical equipment evaluation, patient and family instruction and endurance training.   2. Occupational therapy to address feeding, grooming, upper and lower body dressing and bathing, toileting, tub/toilet/bed transfers, durable medical equipment evaluation, upper extremity strengthening, range of motion/flexibility, dexterity, coordination and patient and family instruction. 3. Rehabilitation nursing 24 hours per day to monitor bowel and bladder function, work on bowel routine, voiding trials/barriga catheter management as per bladder management protocol, assess falls risk upon admission and periodically thereafter, implement and revise falls prevention strategies, maintain skin integrity through initial and daily pressure sore risk assessment (Nate scale), implement and revise pressure sore prevention strategies, educate patient and family members regarding medication administration, ADL's, transfers, and mobility and continue therapy carryover with ADL's, transfers, and mobility  4. Social work and case management consults for discharge planning/disposition issues, as well as coordination and communication of patient progress between family and providers. 5. Rehab psychology - as needed for adjustment, coping  6. Recreational therapy for community reintegration activities. 7.  Speech therapy for cognitive rehab, memory skills and problem solving. II. Medical Management:  # Neuro - s/p stroke. Continue close neuro follow up on DAPT therapy as at risk for recurrent stroke    # Ortho - NA      # Pulm - stable    # GI/bowel/FEN -   - Diet: dysphagia 3 with thin liquids. - Bowel program: as ordered  - GI prophylaxis: None at present monitor for GI upset    # Pain control - Tylenol PRN for pain    # DVT prophylaxis - SCDs and chemoprophylaxis with Lovenox    # Skin -   - maintain skin integrity. - turns q2H. # Renal/urinary -   - Renal: No evidence of renal failure on last BMP   - Bladder: check PVR's times three    # Heme -   - Continue to monitor.   No sign of bleeding at present but now on DAPT      # Cardiovascular System- Close monitoring, ECHO as documented  # ID - NA  # Endocrine - NA  # Psych - support as needed. # Disposition/social - likely discharge home with assist of wife, will discuss in team rounds. # Code status: Full Code was discussed with patient    III. Estimated length of stay: 2 weeks    IV. Goals - Independent/mod I with household mobility, ADLs and self care    V. Anticipated discharge setting: Home    VI. Prognosis: Good    CMS Required Post-Admission Physician Evaluation Elements  History and Physical, including medical history, functional history and active comorbidities as in above text. Preadmission Screen documentation of Dr. Jacky Frye Physician Evaluation comparison:  I concur with the preadmission screen except as documented within this note. He is stable to participate in a comprehensive inpatient rehab program.      Medication Reconciliation CMS Requirements:  Drug Regimen Review:  Upon admission, a complete drug regimen review identify potential clinically significant medication issues requiring immediate attention by a physician. Medication Intervention:   Continue medications as ordered. IOPOC:  I feel care at a lower level would risk decline in function and increase risk of complications.  Begin PT 90 minutes per day 5-7 days per week with focus on strengthening, balance, progressive ambulation, elevations and positioning; OT 90 minutes per day 5-7 days per week with focus on fine motor skills, ADLs, IADLs, energy conservation and equipment needs; SLP 30 minutes per day 5-7 days per week with focus on improved communication, improved oromotor strength and reduced risk of aspiration; rehab nursing 24/7 for skin care, bowel and bladder management, medication management, education, pain control and carryover and  for discharge planning.     Electronically signed by Michael Jang MD on 4/16/2021 at 8:25 AM       Please note that the above documentation was prepared using voice recognition software. Every attempt was made to ensure accuracy but there may be spelling, grammatical, and contextual errors.

## 2021-04-16 NOTE — PROGRESS NOTES
Physical Therapy  Initial Evaluation  Evaluating Therapist: Kim Zuñiga DPT    NAME: Elyse Moore  ROOM: [id number]  DIAGNOSIS: Ischemic cerebral vascular accident -  R MCA  PRECAUTIONS: Falls, L hemiparesis, L inattention   PROCEDURE(S): 4/13 tPA, IR mechanical art intracranial thrombectomy    HPI: Presented on 4/13/21 with left sided weakness. He has history of prior stroke and is on asa and plavix as well as CAD, HLD, HTN, and MI. NIHSS 15. He was found to have right MCA M1 occlusion. Dr. Oscar Castro completed successful thrombectomy with TICI 3 reperfusion and received TPA. Social:  Pt lives with wife in a 1 story floor plan with 3 steps and 2 rails to enter. Pt uses a ramp and transport chair to enter/exit home. Prior to admission pt reports not doing much walking around the house and primarily used a transport chair to get around. Pt states that he uses a \"Upwalker\" to get into/out of his truck. Pt also owns a transport wheelchair, power wheelchair, and canes. Initial Evaluation  4/16/21          Short Term Goals Long Term Goals    Was pt agreeable to Eval/treatment? yes Initial Evaluation Initial Evaluation     Does pt have pain? L hip pain - hematoma from a fall at home       Bed Mobility  Rolling: SBA  Supine to sit: SBA  Sit to supine: SBA  Scooting: SBA   Supervision Independent   Transfers Sit to stand:  Mod A  Stand to sit: Mod A  Stand pivot: Min A with [employer] and [employer] A Supervision   Ambulation   50 feet with UpWalker with Min A   150 feet with AAD with SBA >150 feet with AAD with Supervision   Walking 10 feet on uneven surface 10 feet with UpWalker with Mod A   10 feet with AAD with Min A 10 feet with AAD with Supervision   Wheel Chair Mobility 25 feet Max A   50 feet  feet Mod I   Car Transfers NT - deferred today due to pt's difficulty with STS from high chair   Min A Supervision   Stair negotiation: ascended and descended 1 step x2 reps with B rails with Min A - backwards descending   4 steps with B rail with Min A 4 steps with B rail with Supervision   Curb Step:   ascended and descended NT       Picking up object off the floor Picked up a cone with Min A   Will  an object with SBA Will  an object with Supervision   BLE ROM WFL       BLE Strength RLE 5/5  LLE 4-/5       Balance  Sitting: Supervision  Standing: Min/Mod A       Date Family Teach Completed TBA       Is additional Family Teaching Needed? Y or N Y       Hindering Progress Weakness       PT recommended ELOS 2-3 weeks       Team's Discharge Plan        Therapist at Team Meeting          Pt is alert and Oriented x3  Sensation: Unremarkable  Skin assessment: L hip hematoma from fall at home  Endurance: Fair-     ASSESSMENT  Pt displays functional ability as noted in the objective portion of this evaluation. Comments:  Pt reported increased weakness, specifically in the L hemibody, than he has previously had prior to having this most recent stroke. Pt reported that this is his third stroke and he has had therapy multiple times to aid in his recovery. Pt reported that he actually did not walk much at home and would rather use a transport chair and use his feet to wheel himself around the house. Pt then reported that he uses his UpWalker to ambulate from the house to his truck and back when he has to leave. Pt required cueing during sit<>stand transfers to lean forward and not back. Pt initially performed transfers with the [employer] and demonstrated a slow gait speed, heel strike, and a shuffling gait pattern. When pt performed ambulation with the UpWalker he demonstrated improved foot clearance and gait mechanics, however the pt still still had a forward flexed posture. Pt reported several times throughout the session that he felt like his knees were going to give way due to weakness. Pt is mostly slow moving with all movement patterns.  Pt unable to perform more steps or ambulation due to impaired strength and the feeling of the knees about to give way. Pt will ultimately benefit from therapy services and will make progress toward goals. Patient education  Pt educated on safety with functional mobility, role of PT, PLB, mechanics with transfers, ambulation    Patient response to education:   Pt verbalized understanding Pt demonstrated skill Pt requires further education in this area   yes With cues yes     Rehab potential is Good for reaching above PT goals. Pts/ family goals   1. To return back to PLOF; walk short distance with walker    Patient and or family understand(s) diagnosis, prognosis, and plan of care: yes    PLAN  PT care will be provided in accordance with the objectives noted above. Whenever appropriate, clear delegation orders will be provided for nursing staff. Exercises and functional mobility practice will be used as well as appropriate assistive devices or modalities to obtain goals. Patient and family education will also be administered as needed. Frequency of treatments will be 2x/day M-F and 1x/day Sat or Sun x  2-3 weeks.     Time in: 1045  Time out: [address] [place], [place] [id number]

## 2021-04-16 NOTE — PROGRESS NOTES
SPEECH/LANGUAGE PATHOLOGY  CLINICAL ASSESSMENT OF SWALLOWING FUNCTION    PATIENT NAME:  Alex Corbett      :  1950      TODAY'S DATE:  2021  ROOM:  [address] [place] [address]    SUMMARY OF EVALUATION     DYSPHAGIA DIAGNOSIS:  Mild oral stage dysphagia attributed to mild left labiobuccal and lingual weakness and resolving xerostomia. Pharyngeal stage appears functional.      DIET RECOMMENDATIONS:  Continue Dysphagia 3, Soft/advanced (Soft & Bite-sized) solids with thin liquids. SLP will trial regular consistency foods tomorrow morning, . FEEDING RECOMMENDATIONS:     Assistance level:  No assistance needed. Supervision by SLP is required with PO x2-3. Compensatory strategies recommended: Small bites/sips    THERAPY RECOMMENDATIONS:      Dysphagia therapy is recommended 3-5 times per week for LOS or when goals are met. Pt will complete PO trials of upgraded diet textures with SLP only to determine the least restrictive PO diet to maintain adequate nutrition/hydration with no more than 1 overt s/s of pen/asp. Meal endurance analysis 2-3 sessions to provide diet modification and compensatory strategy implementation due to inconsistent episodes of clinical indicators of dysphagia during intake. PROCEDURE     Consistencies Administered During the Evaluation   Liquids: thin liquid   Solids:  pureed foods and soft solid foods      Method of Intake:   cup, spoon  Self fed, Fed by clinician      Position:   Seated, upright                  RESULTS     Oral Stage:         Dentition: natural. Delayed A-P transit due to: decreased ability for initiation and reduced lingual strength       Pharyngeal Stage:      Cough/throat clear observed x1 which followed presentation of beef; although, not immediately. Patient attributes to medication he is taking and not food. All additional presentations tolerated well. Silent aspiration cannot be ruled out at bedside.   If silent aspiration is suspected, a Videofluoroscopic Study of Swallowing (MBS) is recommended and requires a physician order. The Speech Language Pathologist (SLP) completed education with the patient regarding results of evaluation. Explained that Speech Pathology intervention is warranted  at this time   Prognosis for improvements is good     This plan will be re-evaluated and revised in 1 week  if warranted. Patient stated goals: Agreed with above,   Treatment goals discussed with Patient   The Patient understand(s) the diagnosis, prognosis and plan of care       CPT code:  15771  bedside swallow eval      [x]The admitting diagnosis and active problem list, as listed below have been reviewed prior to initiation of this evaluation.      ADMITTING DIAGNOSIS: Ischemic cerebral vascular accident (CVA) due to stenosis of large extracranial artery (HCC) [I63.20]  Ischemic stroke ([address] [I63.9]     ACTIVE PROBLEM LIST:   Patient Active Problem List   Diagnosis    Cerebrovascular accident (CVA) due to occlusion of right middle cerebral artery ([address]    Coronary artery disease involving native coronary artery of native heart without angina pectoris    History of myocardial infarction    Essential hypertension    Mixed hyperlipidemia    Status post placement of implantable loop recorder    Acute ischemic stroke ([address]    History of CVA (cerebrovascular accident) without residual deficits    History of TIA (transient ischemic attack)    Acute CVA (cerebrovascular accident) ([address]    Ischemic cerebral vascular accident (CVA) due to stenosis of large extracranial artery ([address]    Red blood cell antibody positive    Ischemic stroke ([address]

## 2021-04-16 NOTE — CARE COORDINATION
Per brief team: Plan re-eval (ELOS 2-3 weeks). Updated pt and his spouse, Clemente Ramos. OT D/c goals; stand-by assist to Independent.     DME - TBD    Aftercare - TBD    FT - TBD    Siomara Senters LUISA Intern  Dejon Math  4/16/2021

## 2021-04-16 NOTE — PROGRESS NOTES
SPEECH/LANGUAGE PATHOLOGY  SPEECH/LANGUAGE/COGNITIVE EVALUATION      PATIENT NAME:  Lary Saldana      :  1950         TODAY'S DATE:  2021 ROOM:  [address] [place] [address]     ADMITTING DIAGNOSIS: Ischemic cerebral vascular accident (CVA) due to stenosis of large extracranial artery ([facility] [I63.20]  Ischemic stroke ([facility] [I63.9]    SPEECH PATHOLOGY DIAGNOSIS:    Moderate STM deficit. Mild overall cognitive deficit. Mild dysarthria with left labiobuccal and lingual weakness. THERAPY RECOMMENDATIONS:   Speech Pathology intervention is recommended 3-6 times per week for LOS or when goals are met with emphasis on the following:     Improve STM recall, sequencing and problem solving for increased functional independence with completion of ADLs/IADLs to a level commensurate with supervision. Increase speech intelligibility by               -- improving strength/ROM of the facial and lingual musculature to facilitate and sustain accuracy of articulator placement. -- articulation drill training to promote precision of phoneme production at the word and sentence levels (goal met given 90% accuracy of production with unknown context).                  FIMS SCORES      Swallowing    Current Status  5--Supervision    Short Term Goal 6--Modified Independent    Long Term Goal 6--Modified Independent     Receptive    Current Status  5--Supervision    Short Term Goal 6--Modified Independent    Long Term Goal 6--Modified Independent     Expressive    Current Status  5--Supervision    Short Term Goal 6--Modified Independent    Long Term Goal 6--Modified Independent     Social Interaction    Current Status  4--Minimal Assistance    Short Term Goal 5--Supervision    Long Term Goal 5--Supervision         Problem Solving    Current Status  4--Minimal Assistance    Short Term Goal 5--Supervision    Long Term Goal 5--Supervision      Memory    Current Status  3--Moderate Assistance    Short Term Goal 4--Minimal Assistance    Long Term Goal 5--Supervision         MOTOR SPEECH       Oral Peripheral Examination   Left labiobuccal weakness and Left lingual deviation     Parameters of Speech Production  Respiration:  Adequate for speech production  Articulation:  Distortion  Resonance:  Within functional limits  Quality:   Within functional limits  Pitch: Within functional limits  Intensity: Within functional limits  Fluency:  Intact  Prosody Intact    RECEPTIVE LANGUAGE    Comprehension of Yes/No Questions: Within functional limits    Process  Simple Verbal Commands:   Within functional limits  Process Intermediate Verbal Commands:   Within functional limits  Process Complex Verbal Commands:     Within functional limits    Comprehension of Conversation:      Within functional limits given occasional v/c and increased time. Patient reports longstanding auditory discrimination / hearing deficits. EXPRESSIVE LANGUAGE     Serials: Functional    Imitation:  Words   Functional   Sentences Functional    Naming:  (Modality used:  Verbal)  Confrontation Naming  Functional  Functional Description  To be assessed  Response Naming: Functional    Conversation:      Conversation was within functional limits given occasional cues and increased time. COGNITION     Attention/Orientation  Attention: Sustained attention   Orientation:   Person:  oriented    Place:  oriented    Year:  correct    Month:  accurate within 5 days    Day of Week:  correct    Situation:  accurately described    Memory   Long Term Recall:    Address:  recalled without cuing  Birthdate:  recalled without cuing  Age: recalled after cuing  Family: recalled without cuing    Immediate Recall: Sock:  recalled accurately     Blue:  recalled accurately     Bed:  recalled accurately    Delayed Recall:   Sock: could not recall     Blue:  recalled without cuing     Bed:  recalled without cuing    Organization/Problem Solving/Reasoning   Sequencing:    Verbal: Impaired      Motor: To be assessed    Problem solving: Verbal: Impaired      Motor: To be assessed    Mathematics:  Simple:  Functional     Complex: To be assessed      CLINICAL OBSERVATIONS NOTED DURING THE EVALUATION  Latent responses and Cueing was required      EDUCATION    Speech Pathologist (SLP) completed education with the patient and/or family regarding identified cognitive linguistic deficits and subsequent need for speech pathology intervention. Discussed deficit areas to be targeted by formal intervention and established short/long term goals. Reviewed compensatory strategies to improve functional outcome (as appropriate). Encouraged patient and/or family to engage SLP in structured Q&A session relative to identified deficit areas. Patient and/or family indicated understanding of all information provided via satisfactory verbal response. ? Prognosis for improvements is good  This plan will be re-evaluated and revised in 1 week  if warranted. Patient stated goals: Agreed with above  Treatment goals discussed with Patient   The Patient understand(s) the diagnosis, prognosis and plan of care       The admitting diagnosis and active problem list, as listed below have been reviewed prior to initiation of this evaluation.         ACTIVE PROBLEM LIST:   Patient Active Problem List   Diagnosis    Cerebrovascular accident (CVA) due to occlusion of right middle cerebral artery ([address]    Coronary artery disease involving native coronary artery of native heart without angina pectoris    History of myocardial infarction    Essential hypertension    Mixed hyperlipidemia    Status post placement of implantable loop recorder    Acute ischemic stroke ([address]    History of CVA (cerebrovascular accident) without residual deficits    History of TIA (transient ischemic attack)    Acute CVA (cerebrovascular accident) ([address]    Ischemic cerebral vascular accident (CVA) due to stenosis of large extracranial artery (HCC)    Red blood cell antibody positive    Ischemic stroke ([facility]

## 2021-04-16 NOTE — CONSULTS
Internal Medicine Consult Note    Reason for Consult:  Medical management     Requesting Physician:  Tisha Crane MD    HISTORY OF PRESENT ILLNESS:    The patient is a 66 y.o. male who presents with PMHx of CVA(L MCA in 2016 received tPA at that time) with no residual effects, multiple TIAs on ASA every other day for intolerance and plavix and statin, HLD. 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 ECHO on 4/13 shows EF 45-50% with stage I DD, mild aortic stenosis, no PFO or thrombus. A1c 5.3 on admission       Past Medical History:   Diagnosis Date    CAD (coronary artery disease)     Cerebral artery occlusion with cerebral infarction ([facility]     Hyperlipidemia     Hypertension     MI, old        Past Surgical History:   Procedure Laterality Date    CORONARY ANGIOPLASTY WITH STENT PLACEMENT      INSERTABLE CARDIAC MONITOR      reveal linq cardiac monitor    IR MECHANICAL ART THROMBECTOMY INTRACRANIAL  4/13/2021    IR MECHANICAL ART THROMBECTOMY INTRACRANIAL 4/13/2021 Akhil Perry MD [facility]    OTHER SURGICAL HISTORY  07/12/2016    Dr. Kim Bella insertion       Prior to Admission medications    Medication Sig Start Date End Date Taking? Authorizing Provider   baclofen (LIORESAL) 10 MG tablet Take 1 tablet by mouth 3 times daily 5/23/18   Eveline Anderson MD   metoprolol tartrate (LOPRESSOR) 50 MG tablet Take 50 mg by mouth 2 times daily    Historical Provider, MD   captopril (CAPOTEN) 50 MG tablet Take 50 mg by mouth 3 times daily    Historical Provider, MD   aspirin 81 MG tablet Take 81 mg by mouth every other day    Historical Provider, MD   atorvastatin (LIPITOR) 80 MG tablet Take 80 mg by mouth daily.       Historical Provider, MD   clopidogrel (PLAVIX) on file    Intimate partner violence     Fear of current or ex partner: Not on file     Emotionally abused: Not on file     Physically abused: Not on file     Forced sexual activity: Not on file   Other Topics Concern    Not on file   Social History Narrative    Not on file       History reviewed. No pertinent family history. Review Of Systems:   Constitutional: Denies fever, weight loss, fatigue and night sweats.   Neurological: Denies headache, confusion, sleep difficulties, lightheadedness, spinning sensation, vision loss, tremor, weakness, numbness or tingling, incoordination, imbalance, and incontinence of bowel and sexual dysfunction. Psychiatric: Denies anxiety, depression and hallucinations. Eyes: Denies blurred vision, double vision and eye pain.   ENMT: Denies difficulty swallowing, dental pain, hearing loss, and tinnitus. Cardiovascular: Denies chest pain and palpitations. Respiratory: Denies shortness of breath. Genitourinary: Denies urinary incontinence and retention. Integumentary: Denies rashes. Endocrine: Denies polydipsia and polyuria. Physical Exam:  Vitals:    04/15/21 1846 04/15/21 2105 04/16/21 0730   BP: 127/63 135/71 130/74   Pulse: 83 80 82   Resp: 18 16 18   Temp: 98.1 °F (36.7 °C) 98 °F (36.7 °C) 97.6 °F (36.4 °C)   TempSrc: Oral Temporal Oral   Weight: 270 lb 6.4 oz (122.7 kg)     Height: 6' 1\" (1.854 m)         LUNGS:  No increased work of breathing, good air exchange, clear to auscultation bilaterally, no crackles or wheezing  CARDIOVASCULAR:  Normal apical impulse, regular rate and rhythm, normal S1 and S2, no S3 or S4, and no murmur noted  ABDOMEN:  No scars, normal bowel sounds, soft, non-distended, non-tender, no masses palpated, no hepatosplenomegally  NEUROLOGIC:  Awake, alert, oriented to name, place and time. Cranial nerves II-XII are grossly intact. Motor is 5 out of 5 bilaterally. Cerebellar finger to nose, heel to shin intact. Sensory is intact.   Babinski down going, Romberg negative, and gait is normal.    Assessment/Plan:  1  Acute ischemic stroke in right MCA distribution status post TPA status post thrombectomy. Likely ischemic arthrosclerosis. NIHSS 15 down to 2; hx of multiple CVAs on DAPT and statin   2. HTN on ACEI and BB  3. Active smoker, 0.25 pack per day for > 40 yrs, he reported switched to cigar for the past 3 yrs  4. Anemia  5. LIONEL confirmed with sleep study years ago, he reports not tolerating the mask so not on CPAP    · Cont DAPT and high intensity statin   · Cont lopressor, will switch captopril to losartan for cough   · Consult on smoking cessation, he failed on chantix in the past   · Obtain iron study, folate, B12; check FOBT    Electronically signed by Taz Galeano MD  on 4/16/2021 at 11:08 AM         [facility]  Internal Medicine Clinic    Attending Physician Statement:  Asif Gracia M.D., F.A.C.P. I have discussed the case, including pertinent history and exam findings with the resident/NP. I have seen and examined the patient and the key elements of the encounter have been performed by me. I agree with the resident ROS, PMHx, PSHx, meds reviewed and assessment, plan and orders as documented by the resident/NP      Hospital charts reviewed, including other providers notes, relevant labs and imaging. Seen by Dr Montserrat Estrada yesterday  History of myocardial infarction  Plan: Double therapy with aspirin and Plavix    Essential hypertension  Plan: On several drugs    Mixed hyperlipidemia  Plan: On atorvastatin  Middle cerebral artery thrombus  Hemorrhagic blister on left thigh    ?transfer to our group-- ?fu dr. Jannette Gill  We have called PMR floor to clarify- who will follow? Overnight -issues  Cough, ?switch ACE to ARB    Dropping hemoglobin since admission  (?blood loss noted sp embolectomy- thigh etc)  Also While in IR, he did develop a nosebleed, there is a rhino rocket in place in the right nare.  His symptoms did resolve. There is a large ecchymotic area to the left hip and smaller ecchymotic area to left left flank and left middle toe. Stable hemoglobin now 3 days 9.6 range  Dual antiplatelets- 21 days-- then plavix indefinitely  Obtain iron study, folate, B12; check FOBT-- anemia workup  Ferritin low- suggesting iron deficeny-- will start iron daily      Sp CVA-- new Left sided weakness and neglect  right-sided weakness is back to baseline. He had pre-existing right-sided weakness from a stroke in 2018. >50% of time spent coordinating care with other providers and/or counseling patient/family  Remainder of medical problems as per resident note.

## 2021-04-16 NOTE — PROGRESS NOTES
Occupational Therapy  OCCUPATIONAL THERAPY DAILY NOTE    Date:2021  Patient Name: Min Menezes  MRN: [id number]  : 1950  Room: [address] [place] [address]     Diagnosis: CVA- RMCA  Precautions: Fall Risk & min [place]    Functional Assessment:   Date Status AE  Comments   Feeding 21 Min A     Grooming 21 Min A           Oral Care 21      Bathing 21 Ub: Min A  LB: Mod A     UB Dressing 21 Min A     LB Dressing 21 Max A     Footwear 21 Max A     Toileting 21      Homemaking         Functional Transfers / Balance:   Date Status DME  Comments   Sit Balance 21 SBA     Stand Balance 21 Mod A     [] Tub  [] Shower   Transfer       Commode   Transfer 21 Mod A     Functional   Mobility 21 Mod A Platform rollator    Other:         Functional Exercises / Activity:  Arm Bike X 4 Mins to increase BUE strength and ROM for independence with functional tasks and transfers. Pt demo's fair tolerance. Mod resistive theraputty with wheel and coins to increase BUE coordination, strength, endurance and L hand FMC. Pt completes with increased time and encouragement to use L hand. Sensory / Neuromuscular Re-Education:      Cognitive Skills:   Status Comments   Problem   Solving fair     Memory fair     Sequencing fair     Safety fair       Visual Perception:    Education:  Pt educated on role of OT.     [] Family teach completed on:    Pain Level: Pt c/o knee pain    Additional Notes:       Patient has made good  progress during treatment sessions toward set goals. Therapy emphasis to obtain goals:Strengthening, Gait Training, Patient/Caregiver Education & Training, Home Management Training, ROM, Equipment Evaluation, Education, & procurement, Balance Training, Neuromuscular Re-education, Functional Mobility Training, Cognitive Reorientation, Positioning, Endurance Training, Safety Education & Training, Self-Care / ADL    [x] Continue with current OT Plan of care.   [] Prepare for Discharge     DISCHARGE RECOMMENDATIONS  Recommended DME:    Post Discharge Care:   []Home Independently  []Home with 24hr Care / Supervision []Home with Partial Supervision []Home with Home Health OT []Home with Out Pt OT []Other: ___   Comments:         Time in Time out Tx Time Breakdown  Variance:   First Session  eval+rx  [] Individual Tx-   [] Concurrent Tx -  [] Co-Tx -   [] Group Tx -   [] Time Missed -     Second Session 10:00 10:45 [x] Individual Tx- 45 Mins  [] Concurrent Tx -  [] Co-Tx -   [] Group Tx -   [] Time Missed -     Third Session    [] Individual Tx-   [] Concurrent Tx -  [] Co-Tx -   [] Group Tx -   [] Time Missed -         Total Tx Time 45 Mins        Juan Verma QUINONES/L [id number]    I have read & agree with the above status    Ananda Mora OTR/L [id number]

## 2021-04-16 NOTE — PROGRESS NOTES
Physical Therapy  Daily Treatment Note  Evaluating Therapist: Anamaria Ashley DPT    NAME: Renu Weller  ROOM: [address]  DIAGNOSIS: Ischemic cerebral vascular accident -  R MCA  PRECAUTIONS: Falls, L hemiparesis, L inattention   PROCEDURE(S): 4/13 tPA, IR mechanical art intracranial thrombectomy    HPI: Presented on 4/13/21 with left sided weakness. He has history of prior stroke and is on asa and plavix as well as CAD, HLD, HTN, and MI. NIHSS 15. He was found to have right MCA M1 occlusion. Dr. Aba Leslie completed successful thrombectomy with TICI 3 reperfusion and received TPA. Social:  Pt lives with wife in a 1 story floor plan with 3 steps and 2 rails to enter. Pt uses a ramp and transport chair to enter/exit home. Prior to admission pt reports not doing much walking around the house and primarily used a transport chair to get around. Pt states that he uses a \"Upwalker\" to get into/out of his truck. Pt also owns a transport wheelchair, power wheelchair, and canes. Initial Evaluation  4/16/21      PM    Short Term Goals Long Term Goals    Was pt agreeable to Eval/treatment? yes Initial Evaluation yes     Does pt have pain? L hip pain - hematoma from a fall at home  Nausea      Bed Mobility  Rolling: SBA  Supine to sit: SBA  Sit to supine: SBA  Scooting: SBA  NT - pt found and left in chair Supervision Independent   Transfers Sit to stand:  Mod A  Stand to sit: Mod A  Stand pivot: Min A with [employer] and [employer] Brennan  Sit to stand: Min A  Stand to sit: Min A  Stand pivot: Min A with [facility] Utilities A Supervision   Ambulation   50 feet with UpWalker with Min A  65 feet x1 rep and 75 x1 rep with UpWalker Min A + WC follow 150 feet with AAD with SBA >150 feet with AAD with Supervision   Walking 10 feet on uneven surface 10 feet with UpWalker with Mod A  NT 10 feet with AAD with Min A 10 feet with AAD with Supervision   Wheel Chair Mobility 25 feet Max A  NT 50 feet  feet Mod I   Car Transfers NT - deferred today due to pt's difficulty with STS from high chair  NT Min A Supervision   Stair negotiation: ascended and descended 1 step x2 reps with B rails with Min A - backwards descending   4 steps with B rail with Min A 4 steps with B rail with Supervision   Curb Step:   ascended and descended NT  NT     Picking up object off the floor Picked up a cone with Min A  NT Will  an object with SBA Will  an object with Supervision   BLE ROM WFL  WFL     BLE Strength RLE 5/5  LLE 4-/5  RLE 5/5  LLE 4-/5     Balance  Sitting: Supervision  Standing: Min/Mod A  Sitting: Supervision  Standing: Min/Mod A     Date Family Teach Completed TBA  TBA     Is additional Family Teaching Needed? Y or N Y  Y     Hindering Progress Weakness  Weakness     PT recommended ELOS 2-3 weeks       Team's Discharge Plan        Therapist at Team Meeting          Therapeutic Exercise:   PM:   - Functional mobility as noted above in chart  - Sit<>stand x5 reps  - Forward to backward walking in parallel bars    Additional Comments: Pt reported some mild nausea and tiredness this afternoon. Practiced sit<>Stands to improve technique and decrease assistance from PT with this task. Pt relies on posterior aspects of knees against the chair to anchor his body during sit<>Stands. Pt has impaired eccentric control during stand to sit. Cues given to pt to ambulate with an upright posture with forward eye gaze. Pt's L foot drag becomes more prominent with increased ambulation distances. Pt unable to tolerate much activity this afternoon on account fo feeling \"run down\". Will continue to focus on improving pt's strength, endurance, and function with all mobility activities.      Patient/family education  Pt/family educated on sit<>Stand technique, upright posture    Patient/family response to education:   Pt/family verbalized understanding Pt/family demonstrated skill Pt/family requires further education in this area   yes With cues yes     PM  Time in: 1300  Time out: 1345    Pt is making fair progress toward established Physical Therapy goals. Continue with physical therapy current plan of care.     William Valentine DPT [id number]

## 2021-04-17 LAB
BASOPHILS ABSOLUTE: 0.07 E9/L (ref 0–0.2)
BASOPHILS RELATIVE PERCENT: 0.7 % (ref 0–2)
EOSINOPHILS ABSOLUTE: 0.4 E9/L (ref 0.05–0.5)
EOSINOPHILS RELATIVE PERCENT: 4.2 % (ref 0–6)
HCT VFR BLD CALC: 30.4 % (ref 37–54)
HCT VFR BLD CALC: 30.6 % (ref 37–54)
HEMOGLOBIN: 9.6 G/DL (ref 12.5–16.5)
IMMATURE GRANULOCYTES #: 0.04 E9/L
IMMATURE GRANULOCYTES %: 0.4 % (ref 0–5)
IMMATURE RETIC FRACT: 31.4 % (ref 2.3–13.4)
LYMPHOCYTES ABSOLUTE: 3.86 E9/L (ref 1.5–4)
LYMPHOCYTES RELATIVE PERCENT: 40.7 % (ref 20–42)
MCH RBC QN AUTO: 28.7 PG (ref 26–35)
MCHC RBC AUTO-ENTMCNC: 31.6 % (ref 32–34.5)
MCV RBC AUTO: 90.7 FL (ref 80–99.9)
MONOCYTES ABSOLUTE: 0.92 E9/L (ref 0.1–0.95)
MONOCYTES RELATIVE PERCENT: 9.7 % (ref 2–12)
NEUTROPHILS ABSOLUTE: 4.2 E9/L (ref 1.8–7.3)
NEUTROPHILS RELATIVE PERCENT: 44.3 % (ref 43–80)
PDW BLD-RTO: 15.4 FL (ref 11.5–15)
PLATELET # BLD: 252 E9/L (ref 130–450)
PMV BLD AUTO: 10.1 FL (ref 7–12)
RBC # BLD: 3.35 E12/L (ref 3.8–5.8)
RETIC HGB EQUIVALENT: 35.4 PG (ref 28.2–36.6)
RETICULOCYTE ABSOLUTE COUNT: 0.08 E12/L
RETICULOCYTE COUNT PCT: 2.4 % (ref 0.4–1.9)
WBC # BLD: 9.5 E9/L (ref 4.5–11.5)

## 2021-04-17 PROCEDURE — 6370000000 HC RX 637 (ALT 250 FOR IP): Performed by: PHYSICAL MEDICINE & REHABILITATION

## 2021-04-17 PROCEDURE — 97530 THERAPEUTIC ACTIVITIES: CPT

## 2021-04-17 PROCEDURE — 1280000000 HC REHAB R&B

## 2021-04-17 PROCEDURE — 97110 THERAPEUTIC EXERCISES: CPT

## 2021-04-17 PROCEDURE — 99222 1ST HOSP IP/OBS MODERATE 55: CPT | Performed by: INTERNAL MEDICINE

## 2021-04-17 PROCEDURE — 97535 SELF CARE MNGMENT TRAINING: CPT

## 2021-04-17 PROCEDURE — 85045 AUTOMATED RETICULOCYTE COUNT: CPT

## 2021-04-17 PROCEDURE — 36415 COLL VENOUS BLD VENIPUNCTURE: CPT

## 2021-04-17 PROCEDURE — 92526 ORAL FUNCTION THERAPY: CPT

## 2021-04-17 PROCEDURE — 6370000000 HC RX 637 (ALT 250 FOR IP): Performed by: INTERNAL MEDICINE

## 2021-04-17 PROCEDURE — 51798 US URINE CAPACITY MEASURE: CPT

## 2021-04-17 PROCEDURE — 85025 COMPLETE CBC W/AUTO DIFF WBC: CPT

## 2021-04-17 PROCEDURE — 6360000002 HC RX W HCPCS: Performed by: INTERNAL MEDICINE

## 2021-04-17 RX ORDER — FERROUS SULFATE 325(65) MG
325 TABLET ORAL
Status: DISCONTINUED | OUTPATIENT
Start: 2021-04-18 | End: 2021-04-30 | Stop reason: HOSPADM

## 2021-04-17 RX ORDER — BISACODYL 10 MG
10 SUPPOSITORY, RECTAL RECTAL DAILY PRN
Status: DISCONTINUED | OUTPATIENT
Start: 2021-04-17 | End: 2021-04-30 | Stop reason: HOSPADM

## 2021-04-17 RX ORDER — BISACODYL 10 MG
10 SUPPOSITORY, RECTAL RECTAL DAILY PRN
Status: DISCONTINUED | OUTPATIENT
Start: 2021-04-17 | End: 2021-04-17

## 2021-04-17 RX ADMIN — MAGNESIUM HYDROXIDE 30 ML: 2400 SUSPENSION ORAL at 17:43

## 2021-04-17 RX ADMIN — STANDARDIZED SENNA CONCENTRATE 8.6 MG: 8.6 TABLET ORAL at 22:20

## 2021-04-17 RX ADMIN — ACETAMINOPHEN 650 MG: 325 TABLET ORAL at 22:25

## 2021-04-17 RX ADMIN — CLOPIDOGREL 75 MG: 75 TABLET, FILM COATED ORAL at 08:50

## 2021-04-17 RX ADMIN — ACETAMINOPHEN 650 MG: 325 TABLET ORAL at 17:53

## 2021-04-17 RX ADMIN — BACLOFEN 10 MG: 10 TABLET ORAL at 13:23

## 2021-04-17 RX ADMIN — BACLOFEN 10 MG: 10 TABLET ORAL at 08:50

## 2021-04-17 RX ADMIN — METOPROLOL TARTRATE 50 MG: 50 TABLET, FILM COATED ORAL at 08:49

## 2021-04-17 RX ADMIN — METOPROLOL TARTRATE 50 MG: 50 TABLET, FILM COATED ORAL at 22:20

## 2021-04-17 RX ADMIN — ATORVASTATIN CALCIUM 80 MG: 80 TABLET, FILM COATED ORAL at 08:50

## 2021-04-17 RX ADMIN — LOSARTAN POTASSIUM 25 MG: 25 TABLET, FILM COATED ORAL at 08:50

## 2021-04-17 RX ADMIN — ENOXAPARIN SODIUM 40 MG: 40 INJECTION SUBCUTANEOUS at 08:48

## 2021-04-17 RX ADMIN — ASPIRIN 81 MG CHEWABLE TABLET 81 MG: 81 TABLET CHEWABLE at 08:49

## 2021-04-17 RX ADMIN — BACLOFEN 10 MG: 10 TABLET ORAL at 22:20

## 2021-04-17 RX ADMIN — POLYETHYLENE GLYCOL 3350 17 G: 17 POWDER, FOR SOLUTION ORAL at 08:50

## 2021-04-17 RX ADMIN — FLUTICASONE PROPIONATE 2 SPRAY: 50 SPRAY, METERED NASAL at 08:49

## 2021-04-17 ASSESSMENT — PAIN DESCRIPTION - FREQUENCY
FREQUENCY: CONTINUOUS
FREQUENCY: INTERMITTENT

## 2021-04-17 ASSESSMENT — PAIN DESCRIPTION - PROGRESSION: CLINICAL_PROGRESSION: NOT CHANGED

## 2021-04-17 ASSESSMENT — PAIN SCALES - GENERAL
PAINLEVEL_OUTOF10: 9
PAINLEVEL_OUTOF10: 6
PAINLEVEL_OUTOF10: 0

## 2021-04-17 ASSESSMENT — PAIN DESCRIPTION - PAIN TYPE
TYPE: ACUTE PAIN
TYPE: ACUTE PAIN

## 2021-04-17 ASSESSMENT — PAIN DESCRIPTION - LOCATION
LOCATION: BACK
LOCATION: LEG

## 2021-04-17 ASSESSMENT — PAIN DESCRIPTION - ONSET: ONSET: ON-GOING

## 2021-04-17 ASSESSMENT — PAIN DESCRIPTION - ORIENTATION: ORIENTATION: LEFT

## 2021-04-17 ASSESSMENT — PAIN - FUNCTIONAL ASSESSMENT: PAIN_FUNCTIONAL_ASSESSMENT: PREVENTS OR INTERFERES SOME ACTIVE ACTIVITIES AND ADLS

## 2021-04-17 ASSESSMENT — PAIN DESCRIPTION - DESCRIPTORS: DESCRIPTORS: ACHING

## 2021-04-17 NOTE — PROGRESS NOTES
Speech Language Pathology  ACUTE REHABILITATION--DAILY PROGRESS NOTE            SUMMARY OF EVALUATION    ADMITTING DIAGNOSIS: Ischemic cerebral vascular accident (CVA) due to stenosis of large extracranial artery (HCC) [I63.20]  Ischemic stroke ([facility] [I63.9]                 DYSPHAGIA DIAGNOSIS:  Mild oral stage dysphagia attributed to mild left labiobuccal and lingual weakness and resolving xerostomia. Pharyngeal stage appears functional.                            DIET RECOMMENDATIONS:   Recommend advance to regular consistency solids. Continue thin liquids.                 FEEDING RECOMMENDATIONS:                           Assistance level:  No assistance needed.                              Compensatory strategies recommended: Small bites/sips     THERAPY RECOMMENDATIONS:                       SPEECH PATHOLOGY DIAGNOSIS:    Moderate STM deficit. Mild overall cognitive deficit. Mild dysarthria with left labiobuccal and lingual weakness.      THERAPY RECOMMENDATIONS:   Speech Pathology intervention is recommended 3-6 times per week for LOS or when goals are met with emphasis on the following:      Improve STM recall, sequencing and problem solving for increased functional independence with completion of ADLs/IADLs to a level commensurate with supervision.     Increase speech intelligibility by               -- improving strength/ROM of the facial and lingual musculature to facilitate and sustain accuracy of articulator placement.               -- articulation drill training to promote precision of phoneme production at the word and sentence levels (goal met given 90% accuracy of production with unknown context).                                                                                                                                            FIMS SCORES                            Swallowing                          Current Status               6--Modified Independent               Short Term Goal 6--Modified Independent                       Long Term Goal          6--Modified Independent              Receptive                          Current Status             5--Supervision               Short Term Goal         6--Modified Independent                       Long Term Goal          6--Modified Independent              Expressive                          Current Status             5--Supervision               Short Term Goal         6--Modified Independent                       Long Term Goal          6--Modified Independent              Social Interaction                          Current Status             4--Minimal Assistance               Short Term Goal         5--Supervision               Long Term Goal          5--Supervision                                                     Problem Solving                          Current Status             4--Minimal Assistance               Short Term Goal         5--Supervision               Long Term Goal          5--Supervision                  Memory                          Current Status             3--Moderate Assistance                        Short Term Goal         4--Minimal Assistance               Long Term Goal          5--Supervision                             SWALLOWING:      Diet:   Recommend advance to regular consistency solids. Continue thin liquids      Patient actively participated in functionally-based mealtime assessment; required no assistance to set up meal tray and was able to feed self independently. Reviewed need for slow rate of intake and regulated bite size prior to initiation of PO intake. Oral prep/oral-     No oral containment issues were identified. Good oral prep and A-P propulsion of bolus were noted with good clearance of oral residuals. Pharyngeal-     Clear vocal quality was maintained throughout. No overt s/s of aspiration. Completed education with the patient regarding type of swallowing impairment.

## 2021-04-17 NOTE — PROGRESS NOTES
IV catheter removed intact from LFA per physician order without complication. No c/o pain or discomfort, no redness or drainage at site.

## 2021-04-17 NOTE — PROGRESS NOTES
Progress Note    Subjective/   66y.o. year old male on the rehab unit for recurrent stroke. No new complaints. He has been tolerating the therapy to date. No shortness of breath or chest pain. Objective/   VITALS:  BP (!) 156/91   Pulse 86   Temp 99 °F (37.2 °C) (Temporal)   Resp 18   Ht 6' 1\" (1.854 m)   Wt 270 lb 6.4 oz (122.7 kg)   SpO2 93%   BMI 35.67 kg/m²   24HR INTAKE/OUTPUT:      Intake/Output Summary (Last 24 hours) at 4/17/2021 1729  Last data filed at 4/17/2021 1553  Gross per 24 hour   Intake 1080 ml   Output 1425 ml   Net -345 ml     Constitutional:  Alert, awake, no apparent distress   Cardiovascular:  S1, S2 without m/r/g   Respiratory:  CTA B without w/r/r   Abdomen: Positive bowel sounds, no tenderness  Ext: No pitting LE edema, no calf tenderness or cords  Neuro: Awake and alert. Left-sided weakness    Functional Level         Date   Status   AE    Comments     Feeding   4/16/21   Min A             Grooming   4/16/21   Min A                   Oral Care   4/16/21                 Bathing   4/16/21   Ub: Min A    LB: Mod A           UB Dressing   4/16/21   Min A             LB Dressing   4/17/21   Mod/Max A   reacher   Pt used reacher to wolf/. doff simulated pants needing vc;s and demo to increase follow thru. Footwear   4/17/21   Mod/max A   Reacher sock aid   Pt required vc's and assist using reacher to doff gripper socks then wolf using sock aid. Toileting   4/16/21                 Homemaking                          Functional Transfers / Balance:      Date Status DME  Comments   Sit Balance 4/17/21   SBA   Sitting balance demo during A.E training for LB dressing along with table top arm ex's with BUE's to increase core strength/trunk control for functional tasks. Stand Balance 4/16/21   Mod A   4/17/21 Deferred standing or transfers per patient request due to fatigue and pain issues in back    []? Tub  []?  Shower   Transfer   TBA   \"   Commode   Transfer 4/16/21   Mod A   \"   Functional   Mobility 4/16/21   Mod A Platform rollator \"   Other:              Functional Exercises / Activity:  Pt engaged in A.E training using reacher and sock aid to improve LB dressing skills needing vc;s and demo at mod/max assist.   BUE ex's using david box with 1# wt up/down & circles on incline wedge to increase strength/endurance along with Left handed  tolerating 3 reps of 10 ea      Left handed ex's using [employer] board turning yellow circles over to red in order to increase dexterity, object manipulation for Left hand along with scanning towards Left side. Pt  Needed time to complete intended tasks with cues to incorporate LUE/hand with all therapeutic tasks. Pt used reacher to  objects placed on floor to increase A.E skills for ADL';s.       Sensory / Neuromuscular Re-Education:        Cognitive Skills:    Status Comments   Problem   Solving fair      Memory fair      Sequencing fair      Safety fair             Scheduled Meds:   [START ON 4/18/2021] ferrous sulfate  325 mg Oral Daily with breakfast    ketorolac  15 mg Intravenous Nightly    fluticasone  2 spray Each Nostril Daily    losartan  25 mg Oral Daily    aspirin  81 mg Oral Daily    atorvastatin  80 mg Oral Daily    baclofen  10 mg Oral TID    clopidogrel  75 mg Oral Daily    enoxaparin  40 mg Subcutaneous Daily    metoprolol tartrate  50 mg Oral BID    polyethylene glycol  17 g Oral Daily    senna  1 tablet Oral Nightly     Continuous Infusions:  PRN Meds:acetaminophen, magnesium hydroxide  I/O last 3 completed shifts:   In: 1080 [P.O.:1080]  Out: 1025 [Urine:1025]  I/O this shift:  In: -   Out: 400 [Urine:400]    Labs reviewed  CBC:   Recent Labs     04/16/21  0457 04/17/21  0515   WBC 10.3 9.5   HGB 9.9* 9.6*    252     BMP:    Recent Labs     04/15/21  0718 04/16/21  0457    135   K 4.0 3.7    99   CO2 27 28   BUN 10 11   CREATININE 0.8 0.9   GLUCOSE 98 86 Hepatic:   Recent Labs     04/16/21  0457   AST 20   ALT 16   BILITOT 0.6   ALKPHOS 55     BNP: No results for input(s): BNP in the last 72 hours. Lipids: No results for input(s): CHOL, HDL in the last 72 hours. Invalid input(s): LDLCALCU  INR:   Recent Labs     04/16/21  0457   INR 1.2       Assessment/  Patient Active Problem List:     Cerebrovascular accident (CVA) due to occlusion of right middle cerebral artery ([address]     Coronary artery disease involving native coronary artery of native heart without angina pectoris     History of myocardial infarction     Essential hypertension     Mixed hyperlipidemia     Status post placement of implantable loop recorder     Acute ischemic stroke ([address]     History of CVA (cerebrovascular accident) without residual deficits     History of TIA (transient ischemic attack)     Acute CVA (cerebrovascular accident) ([address]     Ischemic cerebral vascular accident (CVA) due to stenosis of large extracranial artery ([address]     Red blood cell antibody positive     Ischemic stroke ([address]      Plan/  1. Continue rehab program  2. Continue to monitor neuro status  3. Continue secondary stroke prevention  4.  Continue DVT prophylaxis          Rita Fischer MD

## 2021-04-17 NOTE — PROGRESS NOTES
A.E skills for ADL';s.      Sensory / Neuromuscular Re-Education:      Cognitive Skills:   Status Comments   Problem   Solving fair     Memory fair     Sequencing fair     Safety fair       Visual Perception:    Education:  Pt educated using A.E for LB dressing to improve independence with ADL's   Pt educated to incorporate LUE/L hand in all therapeutic tasks to increase functional use. [] Family teach completed on:    Pain Level: Pt c/o B knee and back pain 5-6/10 today    Additional Notes:       Patient has made good  progress during treatment sessions toward set goals. Therapy emphasis to obtain goals:Strengthening, Gait Training, Patient/Caregiver Education & Training, Home Management Training, ROM, Equipment Evaluation, Education, & procurement, Balance Training, Neuromuscular Re-education, Functional Mobility Training, Cognitive Reorientation, Positioning, Endurance Training, Safety Education & Training, Self-Care / ADL    [x] Continue with current OT Plan of care.   [] Prepare for Discharge     DISCHARGE RECOMMENDATIONS  Recommended DME:    Post Discharge Care:   []Home Independently  []Home with 24hr Care / Supervision []Home with Partial Supervision []Home with Home Health OT []Home with Out Pt OT []Other: ___   Comments:         Time in Time out Tx Time Breakdown  Variance:   First Session  9:00am 9:55am [x] Individual Tx- 55mins  [] Concurrent Tx -  [] Co-Tx -   [] Group Tx -   [] Time Missed -     Second Session   [] Individual Tx-  Mins  [] Concurrent Tx -  [] Co-Tx -   [] Group Tx -   [] Time Missed -     Third Session    [] Individual Tx-   [] Concurrent Tx -  [] Co-Tx -   [] Group Tx -   [] Time Missed -         Total Tx Time 55 Mins      Neil Rater STACIE/JESIKA [id number]

## 2021-04-18 ENCOUNTER — APPOINTMENT (OUTPATIENT)
Dept: GENERAL RADIOLOGY | Age: 71
DRG: 056 | End: 2021-04-18
Attending: PHYSICAL MEDICINE & REHABILITATION
Payer: MEDICARE

## 2021-04-18 LAB
ANION GAP SERPL CALCULATED.3IONS-SCNC: 13 MMOL/L (ref 7–16)
BACTERIA: ABNORMAL /HPF
BASOPHILS ABSOLUTE: 0.03 E9/L (ref 0–0.2)
BASOPHILS ABSOLUTE: 0.04 E9/L (ref 0–0.2)
BASOPHILS RELATIVE PERCENT: 0.2 % (ref 0–2)
BASOPHILS RELATIVE PERCENT: 0.4 % (ref 0–2)
BILIRUBIN URINE: NEGATIVE
BLOOD, URINE: ABNORMAL
BUN BLDV-MCNC: 14 MG/DL (ref 8–23)
CALCIUM SERPL-MCNC: 8.7 MG/DL (ref 8.6–10.2)
CHLORIDE BLD-SCNC: 98 MMOL/L (ref 98–107)
CLARITY: CLEAR
CO2: 24 MMOL/L (ref 22–29)
COLOR: YELLOW
CREAT SERPL-MCNC: 0.9 MG/DL (ref 0.7–1.2)
EOSINOPHILS ABSOLUTE: 0.01 E9/L (ref 0.05–0.5)
EOSINOPHILS ABSOLUTE: 0.35 E9/L (ref 0.05–0.5)
EOSINOPHILS RELATIVE PERCENT: 0 % (ref 0–6)
EOSINOPHILS RELATIVE PERCENT: 4.2 % (ref 0–6)
GFR AFRICAN AMERICAN: >60
GFR NON-AFRICAN AMERICAN: >60 ML/MIN/1.73
GLUCOSE BLD-MCNC: 105 MG/DL (ref 74–99)
GLUCOSE URINE: NEGATIVE MG/DL
HCT VFR BLD CALC: 29.8 % (ref 37–54)
HCT VFR BLD CALC: 32.9 % (ref 37–54)
HEMOGLOBIN: 10.5 G/DL (ref 12.5–16.5)
HEMOGLOBIN: 9.6 G/DL (ref 12.5–16.5)
IMMATURE GRANULOCYTES #: 0.03 E9/L
IMMATURE GRANULOCYTES #: 0.14 E9/L
IMMATURE GRANULOCYTES %: 0.4 % (ref 0–5)
IMMATURE GRANULOCYTES %: 0.7 % (ref 0–5)
KETONES, URINE: 15 MG/DL
LEUKOCYTE ESTERASE, URINE: ABNORMAL
LYMPHOCYTES ABSOLUTE: 1.14 E9/L (ref 1.5–4)
LYMPHOCYTES ABSOLUTE: 2.91 E9/L (ref 1.5–4)
LYMPHOCYTES RELATIVE PERCENT: 34.9 % (ref 20–42)
LYMPHOCYTES RELATIVE PERCENT: 5.4 % (ref 20–42)
MCH RBC QN AUTO: 28.5 PG (ref 26–35)
MCH RBC QN AUTO: 29.4 PG (ref 26–35)
MCHC RBC AUTO-ENTMCNC: 31.9 % (ref 32–34.5)
MCHC RBC AUTO-ENTMCNC: 32.2 % (ref 32–34.5)
MCV RBC AUTO: 89.4 FL (ref 80–99.9)
MCV RBC AUTO: 91.1 FL (ref 80–99.9)
METER GLUCOSE: 78 MG/DL (ref 74–99)
MONOCYTES ABSOLUTE: 0.84 E9/L (ref 0.1–0.95)
MONOCYTES ABSOLUTE: 1.37 E9/L (ref 0.1–0.95)
MONOCYTES RELATIVE PERCENT: 10.1 % (ref 2–12)
MONOCYTES RELATIVE PERCENT: 6.5 % (ref 2–12)
NEUTROPHILS ABSOLUTE: 18.52 E9/L (ref 1.8–7.3)
NEUTROPHILS ABSOLUTE: 4.17 E9/L (ref 1.8–7.3)
NEUTROPHILS RELATIVE PERCENT: 50 % (ref 43–80)
NEUTROPHILS RELATIVE PERCENT: 87.2 % (ref 43–80)
NITRITE, URINE: NEGATIVE
PDW BLD-RTO: 15.4 FL (ref 11.5–15)
PDW BLD-RTO: 15.8 FL (ref 11.5–15)
PH UA: 7.5 (ref 5–9)
PLATELET # BLD: 265 E9/L (ref 130–450)
PLATELET # BLD: 275 E9/L (ref 130–450)
PMV BLD AUTO: 9.4 FL (ref 7–12)
PMV BLD AUTO: 9.8 FL (ref 7–12)
POTASSIUM SERPL-SCNC: 3.5 MMOL/L (ref 3.5–5)
PROTEIN UA: NEGATIVE MG/DL
RBC # BLD: 3.27 E12/L (ref 3.8–5.8)
RBC # BLD: 3.68 E12/L (ref 3.8–5.8)
RBC UA: >20 /HPF (ref 0–2)
SARS-COV-2, NAAT: NOT DETECTED
SODIUM BLD-SCNC: 135 MMOL/L (ref 132–146)
SPECIFIC GRAVITY UA: 1.01 (ref 1–1.03)
UROBILINOGEN, URINE: 1 E.U./DL
WBC # BLD: 21.2 E9/L (ref 4.5–11.5)
WBC # BLD: 8.3 E9/L (ref 4.5–11.5)
WBC UA: ABNORMAL /HPF (ref 0–5)

## 2021-04-18 PROCEDURE — 80048 BASIC METABOLIC PNL TOTAL CA: CPT

## 2021-04-18 PROCEDURE — 51798 US URINE CAPACITY MEASURE: CPT

## 2021-04-18 PROCEDURE — 87088 URINE BACTERIA CULTURE: CPT

## 2021-04-18 PROCEDURE — 6370000000 HC RX 637 (ALT 250 FOR IP): Performed by: PHYSICAL MEDICINE & REHABILITATION

## 2021-04-18 PROCEDURE — 71045 X-RAY EXAM CHEST 1 VIEW: CPT

## 2021-04-18 PROCEDURE — 81001 URINALYSIS AUTO W/SCOPE: CPT

## 2021-04-18 PROCEDURE — 87635 SARS-COV-2 COVID-19 AMP PRB: CPT

## 2021-04-18 PROCEDURE — 87077 CULTURE AEROBIC IDENTIFY: CPT

## 2021-04-18 PROCEDURE — 6370000000 HC RX 637 (ALT 250 FOR IP): Performed by: FAMILY MEDICINE

## 2021-04-18 PROCEDURE — 85025 COMPLETE CBC W/AUTO DIFF WBC: CPT

## 2021-04-18 PROCEDURE — 1280000000 HC REHAB R&B

## 2021-04-18 PROCEDURE — 6360000002 HC RX W HCPCS: Performed by: FAMILY MEDICINE

## 2021-04-18 PROCEDURE — 36415 COLL VENOUS BLD VENIPUNCTURE: CPT

## 2021-04-18 PROCEDURE — 6370000000 HC RX 637 (ALT 250 FOR IP): Performed by: INTERNAL MEDICINE

## 2021-04-18 PROCEDURE — 6360000002 HC RX W HCPCS: Performed by: INTERNAL MEDICINE

## 2021-04-18 PROCEDURE — 2580000003 HC RX 258: Performed by: FAMILY MEDICINE

## 2021-04-18 PROCEDURE — 82962 GLUCOSE BLOOD TEST: CPT

## 2021-04-18 PROCEDURE — 87186 SC STD MICRODIL/AGAR DIL: CPT

## 2021-04-18 PROCEDURE — 87040 BLOOD CULTURE FOR BACTERIA: CPT

## 2021-04-18 RX ORDER — SODIUM PHOSPHATE, DIBASIC AND SODIUM PHOSPHATE, MONOBASIC 7; 19 G/133ML; G/133ML
1 ENEMA RECTAL
Status: COMPLETED | OUTPATIENT
Start: 2021-04-18 | End: 2021-04-18

## 2021-04-18 RX ORDER — DOXYCYCLINE HYCLATE 100 MG/1
100 CAPSULE ORAL EVERY 12 HOURS SCHEDULED
Status: DISCONTINUED | OUTPATIENT
Start: 2021-04-18 | End: 2021-04-19

## 2021-04-18 RX ORDER — LACTULOSE 10 G/15ML
20 SOLUTION ORAL 3 TIMES DAILY
Status: COMPLETED | OUTPATIENT
Start: 2021-04-18 | End: 2021-04-18

## 2021-04-18 RX ORDER — TAMSULOSIN HYDROCHLORIDE 0.4 MG/1
0.4 CAPSULE ORAL DAILY
Status: DISCONTINUED | OUTPATIENT
Start: 2021-04-18 | End: 2021-04-19

## 2021-04-18 RX ADMIN — TAMSULOSIN HYDROCHLORIDE 0.4 MG: 0.4 CAPSULE ORAL at 09:07

## 2021-04-18 RX ADMIN — LACTULOSE 20 G: 20 SOLUTION ORAL at 09:10

## 2021-04-18 RX ADMIN — BISACODYL 10 MG: 10 SUPPOSITORY RECTAL at 05:13

## 2021-04-18 RX ADMIN — FLUTICASONE PROPIONATE 2 SPRAY: 50 SPRAY, METERED NASAL at 09:06

## 2021-04-18 RX ADMIN — POLYETHYLENE GLYCOL 3350 17 G: 17 POWDER, FOR SOLUTION ORAL at 09:10

## 2021-04-18 RX ADMIN — LACTULOSE 20 G: 20 SOLUTION ORAL at 14:00

## 2021-04-18 RX ADMIN — ENOXAPARIN SODIUM 40 MG: 40 INJECTION SUBCUTANEOUS at 09:08

## 2021-04-18 RX ADMIN — ACETAMINOPHEN 650 MG: 325 TABLET ORAL at 20:02

## 2021-04-18 RX ADMIN — ACETAMINOPHEN 650 MG: 325 TABLET ORAL at 09:07

## 2021-04-18 RX ADMIN — STANDARDIZED SENNA CONCENTRATE 8.6 MG: 8.6 TABLET ORAL at 21:36

## 2021-04-18 RX ADMIN — WATER 1000 MG: 1 INJECTION INTRAMUSCULAR; INTRAVENOUS; SUBCUTANEOUS at 17:42

## 2021-04-18 RX ADMIN — ATORVASTATIN CALCIUM 80 MG: 80 TABLET, FILM COATED ORAL at 09:06

## 2021-04-18 RX ADMIN — SODIUM PHOSPHATE 1 ENEMA: 7; 19 ENEMA RECTAL at 16:36

## 2021-04-18 RX ADMIN — FERROUS SULFATE TAB 325 MG (65 MG ELEMENTAL FE) 325 MG: 325 (65 FE) TAB at 09:07

## 2021-04-18 RX ADMIN — DOXYCYCLINE HYCLATE 100 MG: 100 CAPSULE ORAL at 21:33

## 2021-04-18 RX ADMIN — LOSARTAN POTASSIUM 25 MG: 25 TABLET, FILM COATED ORAL at 09:08

## 2021-04-18 RX ADMIN — CLOPIDOGREL 75 MG: 75 TABLET, FILM COATED ORAL at 09:07

## 2021-04-18 RX ADMIN — BACLOFEN 10 MG: 10 TABLET ORAL at 09:07

## 2021-04-18 RX ADMIN — METOPROLOL TARTRATE 50 MG: 50 TABLET, FILM COATED ORAL at 21:33

## 2021-04-18 RX ADMIN — METOPROLOL TARTRATE 50 MG: 50 TABLET, FILM COATED ORAL at 09:07

## 2021-04-18 RX ADMIN — BACLOFEN 10 MG: 10 TABLET ORAL at 21:32

## 2021-04-18 RX ADMIN — ASPIRIN 81 MG CHEWABLE TABLET 81 MG: 81 TABLET CHEWABLE at 09:06

## 2021-04-18 RX ADMIN — LACTULOSE 20 G: 20 SOLUTION ORAL at 21:36

## 2021-04-18 RX ADMIN — BACLOFEN 10 MG: 10 TABLET ORAL at 14:00

## 2021-04-18 ASSESSMENT — PAIN SCALES - GENERAL
PAINLEVEL_OUTOF10: 7
PAINLEVEL_OUTOF10: 0
PAINLEVEL_OUTOF10: 4

## 2021-04-18 ASSESSMENT — PAIN DESCRIPTION - PAIN TYPE: TYPE: ACUTE PAIN

## 2021-04-18 ASSESSMENT — PAIN DESCRIPTION - FREQUENCY: FREQUENCY: INTERMITTENT

## 2021-04-18 NOTE — PROGRESS NOTES
Patient called RN to go to bathroom and try to have a bowel movement. Urine Hat placed in toilet to monitor urine output. Patient voided 100 cc of urine that was red tinged. Patient stated he feels as if he emptied his bladder and did not have to urinate any more. Patient also admitted to not feeling as if he had to move his bowels any longer. Patient was then placed back in bed and bladder scanned. PVR was 410 cc. Patient was asked about health history and does not recall much; pt states he was once on Cialis but that was \"over a year ago\", denies taking Flomax and denies urologic or prostate issues. Dr. Darren Fong called and made aware of the issue via telephone and new orders placed. Will hold off on straight cath at this time. Encourage patient to continue to utilize urinal to void and monitor for bowel movement throughout shift. Will educate patient and continue to monitor.

## 2021-04-18 NOTE — PROGRESS NOTES
Physical Therapy  Facility/Department: [address] REHAB  Daily Treatment Note  NAME: Lea Gomez  : 1950  MRN: [id number]    Date of Service: 2021    Pt approached 4x by two different PTs for physical therapy treatment and chart review completed. Patient found seated in [facility] and reported being shaky and feeling cold. Blankets given for comfort and reported findings to RN. On second attempt, RN in the room performing vitals and administering medication. Pt asking to return to bed at this time. On third attempt, pt reported having a fever and to possibly try later. On fourth attempt, pt refused due to not feeling well. Will attempt at a later time/date as able. Thank you for the opportunity to assist in the care of this patient.     Tamy Morales, PT, DPT  License [id number]

## 2021-04-18 NOTE — PROGRESS NOTES
[address] [facility] catheter inserted into Rt. Hand, dorsal side x1 attempt without difficulty, no c/o pain/discomfort, no redness or drainage at IV site.

## 2021-04-18 NOTE — PROGRESS NOTES
Therapeutic Recreation Assessment     LEISURE INTEREST SURVEY               Key: P = Past Interest C = Current Interest F = Future Interest     Arts/Crafts:  _P__Mechanical  ___Woodworking    ___ Painting   ___Floral arranging ___ Ceramics         _ __ Knitting                                                     ___ Crocheting        ___Other: ___________      Cooking:  _ _Baking ___Cooking    ___   Other: _______   Comments:       Games:  ___Cards  ___Darts  _P__Board games    ___Word games  ___Crossword puzzles    ___Seek-n-find/jumble                   _P__Other: Nilton Coelho [address] 's________      Horticulture:  ___Vegetables  ___Flowers  ___House plants                                                     ___Other: ___________      House/Yard Care:  ___Ironing  ___Laundry  ___Cleaning                                                      ___Repairs              _C__Lawn care                                                     ___Other: ___________      Music Listening/Playing:  ___Classical       ___Big Band       _I__Rkks-n-Vlnx                                                     ___Country          ___R&B             ___Gospel/Hymns                                                     ___Other: ___________      Outdoor Activities:  ___Hunting          _C__Fishing          _C_Camping                                                     _C__Other: __[employer] Shooting_________      Pets/Animals:  X 3___Dogs             ___Cats                ___Fish                                                     ___Birds             ___Livestock         ___Other: _________    Reading:   ___Newspapers  ___Magazines ___Other:stories                                                     ___Other: ___________      Lutheran Activities:  Caodaism: ___________   [place] Activities: ___________      Shopping:   ___Grocery  ___Clothing  ___Flea market     ___Other: ___________      Socialization: _C__Family  _C__Friends  _C__Neighbors ___Church  ___Volunteer ___Club/Organization                                                     ___Other: ___________    Sports/Exercises:  ___Walking  P___Football  ___Basketball     ___Golf  ___Baseball  ___Tennis       ___Bowling  ___Other: ___________      Traveling:   ___Global  _C__Stateside  ___Local       ___Other: ___________      TV/Radio:   C___Mysteries  ___Comedies ___Romance                                                     ___Game show       ___Sports       ___News                                                      ___Talk show          _C__Other: [employer] repairs__________      Other:     Personal Leisure Profile:   Question Response   Attributes Identify a positive quality: []Ambitious  []Appreciative  []Caring  []Courteous  []Considerate  []Compassionate  []Creative  []Dependable   []Generous  []Honest  []Hard working  [employer]  [x]Kind  [][place]   [][place]  []Mannerly  []Patient  []Polite  []Respectful  []Sociable  []Sense of humor  []Thoughtful  []Other: ___________    [facility] are very good at Getting along with people   Awareness Why do you participate in your leisure interest: []Competition  []Creativity  []Concentration  []Exercise  [x]Enjoyment  []Fun  []Hand/eye Coordination  []Increase confidence  []Learning a new skill  []Relaxation  []Social Interaction  []Supporting one another  []Thinking  []Other: ___________    Are your leisure activities limited at this time? [x]Yes  []No  Comments: _________    How often do you participate in your leisure interest? Everyday with the dogs   Resources Name a restaurant, store or business you frequent? [employer]   Personal When are you most happy?  With my dogs     Barriers to Leisure Participation:  []Visual   [][place] [employer]  []Cognition/Communication  []Motivation  []Financial  []Transportation  []Time Constraints  [x]Physical Ability  []Leisure Awareness  []Drug/Alcohol  []Other: ___________    Recommendations:  []Group Session  [x]Individual Session []Client Refused Services  []No Therapy  []Other: ___________    Objectives:  [x]Establish adaptations for leisure involvement   []Increase Self worth/self esteem  []Community reintegration training   [x]Social interaction  [x]Leisure participation  []Other: ___________    Goals for Therapeutic Recreation Services:   Social Interaction:   Admission Functional [place] Measure: ____5_______  Discharge Functional [place]imer Measure: _____6______   Other:________________________________      Leisure Choice/ Increase [place] Quality of Life: To participate in 1 premorbid leisure activities of choice by discharge to increase leisure choice and independence thus increasing the overall quality of his/her life. Socialization/Social Interaction: To increase social interaction skills by introducing self 1 x per week at the beginning of TR session Socialization/Social Interaction: To initiate conversation 1 x per week during the TR session    Adaptations: To increase knowledge of adaptations to leisure involvement by participating in 1 modified leisure activities by dc if available . Leisure Activity Tolerance: To increase leisure tolerance by attending 1 leisure activities per week for 20 minutes with active participation. Self Expression: To participate in 1 leisure activity(s) of choice, identifying 1 benefits to leisure participation. Tomás Delay  [place] [employer]

## 2021-04-18 NOTE — PROGRESS NOTES
Dr. Talia Paula called and made aware of CXR result. Per Dr. Talia Paula, Dr. Vannesa Arceo (on call for Dr. Darius Cuellar) called with results of CXR and Lab values. N.O. Given for Rocephin 1gm IV QD and Doxycycline 100mg PO BID, and repeat CBC in am. Patient has order for daily cbc/diff.

## 2021-04-18 NOTE — PROGRESS NOTES
Pt had voided another 225cc of urine so he was bladder scanned again for 458cc. Explained to Pt that he may empty his bladder more fully if he were up to the bathroom or standing @ the besdside to void in a urinal. Explained that his bladder needed emptied as he was showing a high residual after voiding again. Pt may likely have issues with his high residuals as he stated earlier he remembers taking flomax or and agent that does the same thing \"a long log time ago\" Pt could not remember why he was on it exactly, or when or why he was taken off of it. Pt ALSO C/O back pain. Using k-pad and taking tyenol. Pt states that he felt his back hurt because he was laying in the bed too much. States that this was not a chronic pain. Pt also informed that he had not moved his bwels since 4/14/21. So that may be what is hampering his bladder from emptying, or perhaps the bladder is also keeping him from having a BM. Pt  Had the urge to defecate earlier but once he was in the [address] [place] [address] Street lost the sensation to go. I told the pt that his constipation may be attributing to his back discomfort as well. Pt had taken his glycolax today and his MOM AND SENOKOT. He refused the dulcolax R/S I brought in for him but stated he would take it in the morning if he still has not gone by then.

## 2021-04-18 NOTE — PROGRESS NOTES
Dr. Charanjit Thomson covering for Dr. Allie Tavares returned call and made aware of patient symptoms/complaints and new orders from Dr. Ash Sol. Dr. Charanjit Thomson ordered Covid 19 test x1.

## 2021-04-19 ENCOUNTER — APPOINTMENT (OUTPATIENT)
Dept: GENERAL RADIOLOGY | Age: 71
DRG: 056 | End: 2021-04-19
Attending: PHYSICAL MEDICINE & REHABILITATION
Payer: MEDICARE

## 2021-04-19 LAB
BASOPHILS ABSOLUTE: 0.04 E9/L (ref 0–0.2)
BASOPHILS RELATIVE PERCENT: 0.2 % (ref 0–2)
EOSINOPHILS ABSOLUTE: 0.02 E9/L (ref 0.05–0.5)
EOSINOPHILS RELATIVE PERCENT: 0.1 % (ref 0–6)
HCT VFR BLD CALC: 29.5 % (ref 37–54)
HEMOGLOBIN: 9.5 G/DL (ref 12.5–16.5)
IMMATURE GRANULOCYTES #: 0.2 E9/L
IMMATURE GRANULOCYTES %: 1 % (ref 0–5)
LYMPHOCYTES ABSOLUTE: 1.81 E9/L (ref 1.5–4)
LYMPHOCYTES RELATIVE PERCENT: 8.9 % (ref 20–42)
MCH RBC QN AUTO: 29.2 PG (ref 26–35)
MCHC RBC AUTO-ENTMCNC: 32.2 % (ref 32–34.5)
MCV RBC AUTO: 90.8 FL (ref 80–99.9)
MONOCYTES ABSOLUTE: 0.65 E9/L (ref 0.1–0.95)
MONOCYTES RELATIVE PERCENT: 3.2 % (ref 2–12)
NEUTROPHILS ABSOLUTE: 17.53 E9/L (ref 1.8–7.3)
NEUTROPHILS RELATIVE PERCENT: 86.6 % (ref 43–80)
PDW BLD-RTO: 16.2 FL (ref 11.5–15)
PLATELET # BLD: 263 E9/L (ref 130–450)
PMV BLD AUTO: 10.1 FL (ref 7–12)
RBC # BLD: 3.25 E12/L (ref 3.8–5.8)
WBC # BLD: 20.3 E9/L (ref 4.5–11.5)

## 2021-04-19 PROCEDURE — 6370000000 HC RX 637 (ALT 250 FOR IP): Performed by: INTERNAL MEDICINE

## 2021-04-19 PROCEDURE — 1280000000 HC REHAB R&B

## 2021-04-19 PROCEDURE — 97530 THERAPEUTIC ACTIVITIES: CPT

## 2021-04-19 PROCEDURE — 2580000003 HC RX 258: Performed by: FAMILY MEDICINE

## 2021-04-19 PROCEDURE — 97535 SELF CARE MNGMENT TRAINING: CPT

## 2021-04-19 PROCEDURE — 36415 COLL VENOUS BLD VENIPUNCTURE: CPT

## 2021-04-19 PROCEDURE — 97130 THER IVNTJ EA ADDL 15 MIN: CPT

## 2021-04-19 PROCEDURE — 97112 NEUROMUSCULAR REEDUCATION: CPT

## 2021-04-19 PROCEDURE — 6370000000 HC RX 637 (ALT 250 FOR IP): Performed by: FAMILY MEDICINE

## 2021-04-19 PROCEDURE — 2580000003 HC RX 258: Performed by: SPECIALIST

## 2021-04-19 PROCEDURE — 6360000002 HC RX W HCPCS: Performed by: INTERNAL MEDICINE

## 2021-04-19 PROCEDURE — 71046 X-RAY EXAM CHEST 2 VIEWS: CPT

## 2021-04-19 PROCEDURE — 6360000002 HC RX W HCPCS: Performed by: SPECIALIST

## 2021-04-19 PROCEDURE — 85025 COMPLETE CBC W/AUTO DIFF WBC: CPT

## 2021-04-19 PROCEDURE — 51798 US URINE CAPACITY MEASURE: CPT

## 2021-04-19 PROCEDURE — 6360000002 HC RX W HCPCS: Performed by: FAMILY MEDICINE

## 2021-04-19 PROCEDURE — 97129 THER IVNTJ 1ST 15 MIN: CPT

## 2021-04-19 RX ORDER — SODIUM CHLORIDE 0.9 % (FLUSH) 0.9 %
SYRINGE (ML) INJECTION
Status: COMPLETED
Start: 2021-04-19 | End: 2021-04-20

## 2021-04-19 RX ORDER — TAMSULOSIN HYDROCHLORIDE 0.4 MG/1
0.4 CAPSULE ORAL NIGHTLY
Status: DISCONTINUED | OUTPATIENT
Start: 2021-04-19 | End: 2021-04-30 | Stop reason: HOSPADM

## 2021-04-19 RX ORDER — LOSARTAN POTASSIUM 50 MG/1
50 TABLET ORAL DAILY
Status: DISCONTINUED | OUTPATIENT
Start: 2021-04-19 | End: 2021-04-30 | Stop reason: HOSPADM

## 2021-04-19 RX ADMIN — LOSARTAN POTASSIUM 50 MG: 50 TABLET, FILM COATED ORAL at 08:58

## 2021-04-19 RX ADMIN — ASPIRIN 81 MG CHEWABLE TABLET 81 MG: 81 TABLET CHEWABLE at 08:58

## 2021-04-19 RX ADMIN — FLUTICASONE PROPIONATE 2 SPRAY: 50 SPRAY, METERED NASAL at 09:04

## 2021-04-19 RX ADMIN — FERROUS SULFATE TAB 325 MG (65 MG ELEMENTAL FE) 325 MG: 325 (65 FE) TAB at 08:58

## 2021-04-19 RX ADMIN — VANCOMYCIN HYDROCHLORIDE 1500 MG: 10 INJECTION, POWDER, LYOPHILIZED, FOR SOLUTION INTRAVENOUS at 23:09

## 2021-04-19 RX ADMIN — BACLOFEN 10 MG: 10 TABLET ORAL at 08:59

## 2021-04-19 RX ADMIN — ATORVASTATIN CALCIUM 80 MG: 80 TABLET, FILM COATED ORAL at 08:59

## 2021-04-19 RX ADMIN — METOPROLOL TARTRATE 50 MG: 50 TABLET, FILM COATED ORAL at 20:41

## 2021-04-19 RX ADMIN — ACETAMINOPHEN 650 MG: 325 TABLET ORAL at 09:05

## 2021-04-19 RX ADMIN — POLYETHYLENE GLYCOL 3350 17 G: 17 POWDER, FOR SOLUTION ORAL at 09:05

## 2021-04-19 RX ADMIN — BACLOFEN 10 MG: 10 TABLET ORAL at 13:17

## 2021-04-19 RX ADMIN — BACLOFEN 10 MG: 10 TABLET ORAL at 20:40

## 2021-04-19 RX ADMIN — STANDARDIZED SENNA CONCENTRATE 8.6 MG: 8.6 TABLET ORAL at 20:41

## 2021-04-19 RX ADMIN — DOXYCYCLINE HYCLATE 100 MG: 100 CAPSULE ORAL at 08:58

## 2021-04-19 RX ADMIN — WATER 1000 MG: 1 INJECTION INTRAMUSCULAR; INTRAVENOUS; SUBCUTANEOUS at 17:31

## 2021-04-19 RX ADMIN — MEROPENEM 1000 MG: 1 INJECTION, POWDER, FOR SOLUTION INTRAVENOUS at 18:43

## 2021-04-19 RX ADMIN — CLOPIDOGREL 75 MG: 75 TABLET, FILM COATED ORAL at 08:59

## 2021-04-19 RX ADMIN — METOPROLOL TARTRATE 50 MG: 50 TABLET, FILM COATED ORAL at 08:58

## 2021-04-19 RX ADMIN — TAMSULOSIN HYDROCHLORIDE 0.4 MG: 0.4 CAPSULE ORAL at 20:41

## 2021-04-19 RX ADMIN — ENOXAPARIN SODIUM 40 MG: 40 INJECTION SUBCUTANEOUS at 09:05

## 2021-04-19 ASSESSMENT — PAIN DESCRIPTION - FREQUENCY: FREQUENCY: CONTINUOUS

## 2021-04-19 ASSESSMENT — PAIN SCALES - GENERAL
PAINLEVEL_OUTOF10: 0
PAINLEVEL_OUTOF10: 4

## 2021-04-19 ASSESSMENT — PAIN - FUNCTIONAL ASSESSMENT: PAIN_FUNCTIONAL_ASSESSMENT: PREVENTS OR INTERFERES SOME ACTIVE ACTIVITIES AND ADLS

## 2021-04-19 ASSESSMENT — PAIN DESCRIPTION - PAIN TYPE: TYPE: ACUTE PAIN

## 2021-04-19 ASSESSMENT — PAIN DESCRIPTION - ONSET: ONSET: ON-GOING

## 2021-04-19 NOTE — PROGRESS NOTES
Call placed to Dr. Reynoso Right office. Message left to call facility r/t results of Blood culture #1.

## 2021-04-19 NOTE — PROGRESS NOTES
to 0.156. Pt shown his results & good see the gains he had made. Plan to use as a part of his intervention to improved pt ADLs. Cognitive Skills:   Status Comments   Problem   Solving fair     Memory fair     Sequencing fair     Safety fair       Visual Perception:    Education:  Pt educated on hand placement and safety during functional transfers. .   [] Family teach completed on:    Pain Level: no c/o pain this date. Additional Notes:       Patient has made good  progress during treatment sessions toward set goals. Therapy emphasis to obtain goals:Strengthening, Gait Training, Patient/Caregiver Education & Training, Home Management Training, ROM, Equipment Evaluation, Education, & procurement, Balance Training, Neuromuscular Re-education, Functional Mobility Training, Cognitive Reorientation, Positioning, Endurance Training, Safety Education & Training, Self-Care / ADL    [x] Continue with current OT Plan of care. [] Prepare for Discharge     DISCHARGE RECOMMENDATIONS  Recommended DME:    Post Discharge Care:   []Home Independently  []Home with 24hr Care / Supervision []Home with Partial Supervision []Home with Home Health OT []Home with Out Pt OT []Other: ___   Comments:         Time in Time out Tx Time Breakdown  Variance:   First Session  730 800 [x] Individual Tx-  30 mins  [] Concurrent Tx -  [] Co-Tx -   [] Group Tx -   [] Time Missed -     Second Session 1:45 2:30 [x] Individual Tx- 45 Mins  [] Concurrent Tx -  [] Co-Tx -   [] Group Tx -   [] Time Missed -     Third Session    [] Individual Tx-   [] Concurrent Tx -  [] Co-Tx -   [] Group Tx -   [] Time Missed -         Total Tx Time 75 Mins    Ira Lopez OTR/L [id number] [facility] [place]/L [id number]    I have read & agree with the above status.     Ira Lopez OTR/L [id number]

## 2021-04-19 NOTE — PROGRESS NOTES
6--Modified Independent                       Long Term Goal          6--Modified Independent              Receptive                          Current Status             5--Supervision               Short Term Goal         6--Modified Independent                       Long Term Goal          6--Modified Independent              Expressive                          Current Status             5--Supervision               Short Term Goal         6--Modified Independent                       Long Term Goal          6--Modified Independent              Social Interaction                          Current Status             4--Minimal Assistance               Short Term Goal         5--Supervision               Long Term Goal          5--Supervision                                                     Problem Solving                          Current Status             4--Minimal Assistance               Short Term Goal         5--Supervision               Long Term Goal          5--Supervision                  Memory                          Current Status             3--Moderate Assistance                        Short Term Goal         4--Minimal Assistance               Long Term Goal          5--Supervision                             SWALLOWING:      Diet:   Recommend advance to regular consistency solids. Continue thin liquids      Supervision is no longer required during mealtimes. LANGUAGE:     Benefits from increased time and occasional cues. Per patient, he has decreased auditory acuity and discrimination; reports issue as longstanding. COGNITION:      Decreased recall of items on breakfast/lunch trays this date. Patient requires consistent min v/c for hand placement during execution of stand/stand transfers. Fair/fair+ outcome when provided 2D pattern and asked to generate corresponding 3D design.  Patient benefited from inconsistent min cues with completion of mildly-moderately complex designs and consistent min-mod v/c as complexity of task increased. Patient also benefited from increased time. Safety:  Fair-; SLP educated patient multiple times regarding need to request assistance (and not transfer self or take self to bathroom). SPEECH:     Mild dysarthria with left labiobuccal and lingual weakness. Patient feels his speech is at baseline. SP recommended after discharge: Yes pending discussion with patient's wife (patient feels he is at baseline from previous CVAs ~3 and ~5 years ago). Supervision recommended at discharge: 24 hour    Will continue SP intervention as per previously established POC.     Minute Tracking:    Individual therapy:     0 minutes  Concurrent therapy:    0 minutes  Group therapy:     0 minutes  Co-treatment therapy:   45 minutes    Total minutes for 4/19/2021: 45 minutes

## 2021-04-19 NOTE — PROGRESS NOTES
Physical Therapy  Daily Treatment Note  Evaluating Therapist: Andreea Clement DPT    NAME: Tosha Mabry  ROOM: [id number]  DIAGNOSIS: Ischemic cerebral vascular accident -  R MCA  PRECAUTIONS: Falls, L hemiparesis, L inattention   PROCEDURE(S): 4/13 tPA, IR mechanical art intracranial thrombectomy    HPI: Presented on 4/13/21 with left sided weakness. He has history of prior stroke and is on asa and plavix as well as CAD, HLD, HTN, and MI. NIHSS 15. He was found to have right MCA M1 occlusion. Dr. Bob Patten completed successful thrombectomy with TICI 3 reperfusion and received TPA. Social:  Pt lives with wife in a 1 story floor plan with 3 steps and 2 rails to enter. Pt uses a ramp and transport chair to enter/exit home. Prior to admission pt reports not doing much walking around the house and primarily used a transport chair to get around. Pt states that he uses a \"Upwalker\" to get into/out of his truck. Pt also owns a transport wheelchair, power wheelchair, and canes. Initial Evaluation  4/16/21 AM     PM    Short Term Goals Long Term Goals    Was pt agreeable to Eval/treatment? yes yes yes     Does pt have pain? L hip pain - hematoma from a fall at home No c/o pain B knee \"grinding\"      Bed Mobility  Rolling: SBA  Supine to sit: SBA  Sit to supine: SBA  Scooting: SBA Supine to sit: Supervision Supine to sit: Supervision Supervision Independent   Transfers Sit to stand:  Mod A  Stand to sit: Mod A  Stand pivot: Min A with [employer] and UpWalker Sit to stand: CGA  Stand to sit: CGA  Stand pivot: CGA with UpWalker Sit to stand: CGA  Stand to sit: CGA  Stand pivot: CGA with UpWalker Min A Supervision   Ambulation   50 feet with UpWalker with Min A 50 feet x2 reps with UpWalker CGA 50 feet x2 reps with UpWalker  feet with AAD with SBA >150 feet with AAD with Supervision   Walking 10 feet on uneven surface 10 feet with UpWalker with Mod A NT NT 10 feet with AAD with Min A 10 feet with AAD with Supervision Wheel Chair Mobility 25 feet Max A NT NT 50 feet  feet Mod I   Car Transfers NT - deferred today due to pt's difficulty with STS from high chair NT NT Min A Supervision   Stair negotiation: ascended and descended 1 step x2 reps with B rails with Min A - backwards descending 1 step x5 reps and x3 reps with B rails Min A - backwards descending  4 steps with B rail with Min A 4 steps with B rail with Supervision   Curb Step:   ascended and descended NT NT NT     Picking up object off the floor Picked up a cone with Min A NT NT Will  an object with SBA Will  an object with Supervision   BLE ROM WFL WFL      BLE Strength RLE 5/5  LLE 4-/5 RLE 5/5  LLE 4-/5      Balance  Sitting: Supervision  Standing: Min/Mod A Sitting: Supervision  Standing: Min A with UpWalker      Date Family Teach Completed TBA TBA      Is additional Family Teaching Needed? Y or N Y Y      Hindering Progress Weakness Weakness      PT recommended ELOS 2-3 weeks       Team's Discharge Plan        Therapist at Team Meeting          Therapeutic Exercise:   AM:   - Functional mobility as noted above in chart  - Sit<>stand x5 reps  - 60 feet with UpWalker with CGA + WC follow  PM:   - Functional mobility as noted above in chart  - Sit<>stand x5 reps  - Side stepping in parallel bars, down and back 2x1/side  - 4-inch box step up downs in parallel bars x5/side    Additional Comments: Pt had been running a fever this past weekend but has since resolved. O2 sats ranged in the low to mid 90's during each session. Pt moves slow with all mobility activities. Pt ambulates with a forward flexed posture with decreased LLE swing. Pt's endurance with activity is impaired compared to his PLOF per pt report. Pt reported that his knees are  \"grinding\" and that this is limiting his ambulation distance. PT attempting to progress pt in all functional mobility, strengthening, and endurance to maximize progression toward goals.  Will continue to

## 2021-04-19 NOTE — PROGRESS NOTES
Progress Note    Subjective/   66y.o. year old male on the rehab unit for recurrent stroke with new left-sided weakness. Tolerating therapy. No pain complaints. No new dizziness. No shortness of breath or chest pain. Bowel and bladder okay. Objective/   VITALS:  /74   Pulse 88   Temp 97.5 °F (36.4 °C) (Temporal)   Resp 16   Ht 6' 1\" (1.854 m)   Wt 270 lb 6.4 oz (122.7 kg)   SpO2 92%   BMI 35.67 kg/m²   24HR INTAKE/OUTPUT:      Intake/Output Summary (Last 24 hours) at 4/19/2021 1058  Last data filed at 4/19/2021 6259  Gross per 24 hour   Intake 840 ml   Output 950 ml   Net -110 ml     Constitutional:  Alert, awake, no apparent distress   Cardiovascular:  S1, S2 without m/r/g   Respiratory:  CTA B without w/r/r   Abdomen: Positive bowel sounds  Ext: No calf tenderness. No pitting LE edema  Neuro: Awake and alert. Good sitting balance. Left-sided weakness    Functional Level    Initial Evaluation  4/16/21 AM     PM    Short Term Goals Long Term Goals    Was pt agreeable to Eval/treatment? yes yes yes       Does pt have pain? L hip pain - hematoma from a fall at home No c/o pain B knee \"grinding\"        Bed Mobility  Rolling: SBA  Supine to sit: SBA  Sit to supine: SBA  Scooting: SBA Supine to sit: Supervision Supine to sit: Supervision Supervision Independent   Transfers Sit to stand:  Mod A  Stand to sit: Mod A  Stand pivot: Min A with [address] and UpWalker Sit to stand: CGA  Stand to sit: CGA  Stand pivot: CGA with UpWalker Sit to stand: CGA  Stand to sit: CGA  Stand pivot: CGA with UpWalker Min A Supervision   Ambulation   50 feet with UpWalker with Min A 50 feet x2 reps with UpWalker CGA 50 feet x2 reps with UpWalker  feet with AAD with SBA >150 feet with AAD with Supervision   Walking 10 feet on uneven surface 10 feet with UpWalker with Mod A NT NT 10 feet with AAD with Min A 10 feet with AAD with Supervision   Wheel Chair Mobility 25 feet Max A NT NT 50 feet  feet Mod hours. Lipids: No results for input(s): CHOL, HDL in the last 72 hours. Invalid input(s): LDLCALCU  INR: No results for input(s): INR in the last 72 hours. Assessment/  Patient Active Problem List:     Cerebrovascular accident (CVA) due to occlusion of right middle cerebral artery ([facility] [address]     Coronary artery disease involving native coronary artery of native heart without angina pectoris     History of myocardial infarction     Essential hypertension     Mixed hyperlipidemia     Status post placement of implantable loop recorder     Acute ischemic stroke ([facility] [address]     History of CVA (cerebrovascular accident) without residual deficits     History of TIA (transient ischemic attack)     Acute CVA (cerebrovascular accident) ([facility] [address]     Ischemic cerebral vascular accident (CVA) due to stenosis of large extracranial artery ([facility] [address]     Red blood cell antibody positive     [facility]      Plan/  1. Continue rehab program  2. Continue secondary stroke prevention  3. Continue close neuro monitoring  4. Continue DVT prophylaxis  5. Continue blood pressure control  6. Labs reviewed. Elevated white count. Suspect UTI  7.  Monitor H&H          Maria Isabel Kumar MD

## 2021-04-19 NOTE — PROGRESS NOTES
Explained need for catheter placement,pt does not want an indwelling barriga cath placed d/t difficulty in intermittent cath procedure and feeling that trauma was involved. Pt is also not agreeable to see a urologist at this point as well.

## 2021-04-19 NOTE — PROGRESS NOTES
Department of [facility]  Progress Note      SUBJECTIVE: Fever seems to be down although white count is still elevated;    OBJECTIVE chest x-ray essentially normal; he does have significant urinary retention and apparently was catheterized 4 times I am questioning whether the prostate was inflamed  I would do a upright chest x-ray  Medications    Current Facility-Administered Medications: losartan (COZAAR) tablet 50 mg, 50 mg, Oral, Daily  tamsulosin (FLOMAX) capsule 0.4 mg, 0.4 mg, Oral, Nightly  cefTRIAXone (ROCEPHIN) 1,000 mg in sterile water 10 mL IV syringe, 1,000 mg, Intravenous, Q24H  doxycycline hyclate (VIBRAMYCIN) capsule 100 mg, 100 mg, Oral, 2 times per day  ferrous sulfate (IRON 325) tablet 325 mg, 325 mg, Oral, Daily with breakfast  bisacodyl (DULCOLAX) suppository 10 mg, 10 mg, Rectal, Daily PRN  fluticasone (FLONASE) 50 MCG/ACT nasal spray 2 spray, 2 spray, Each Nostril, Daily  acetaminophen (TYLENOL) tablet 650 mg, 650 mg, Oral, Q4H PRN  aspirin chewable tablet 81 mg, 81 mg, Oral, Daily  atorvastatin (LIPITOR) tablet 80 mg, 80 mg, Oral, Daily  baclofen (LIORESAL) tablet 10 mg, 10 mg, Oral, TID  clopidogrel (PLAVIX) tablet 75 mg, 75 mg, Oral, Daily  enoxaparin (LOVENOX) injection 40 mg, 40 mg, Subcutaneous, Daily  metoprolol tartrate (LOPRESSOR) tablet 50 mg, 50 mg, Oral, BID  polyethylene glycol (GLYCOLAX) packet 17 g, 17 g, Oral, Daily  senna (SENOKOT) tablet 8.6 mg, 1 tablet, Oral, Nightly  magnesium hydroxide (MILK OF MAGNESIA) 400 MG/5ML suspension 30 mL, 30 mL, Oral, Daily PRN  Physical    VITALS:  BP (!) 103/52   Pulse 88   Temp 99.9 °F (37.7 °C) (Temporal)   Resp 20   Ht 6' 1\" (1.854 m)   Wt 270 lb 6.4 oz (122.7 kg)   SpO2 93%   BMI 35.67 kg/m²   24HR INTAKE/OUTPUT:      Intake/Output Summary (Last 24 hours) at 4/19/2021 0812  Last data filed at 4/19/2021 0742  Gross per 24 hour   Intake 840 ml   Output 950 ml   Net -110 ml     LUNGS: No increased work of breathing, good air exchange, clear to auscultation bilaterally, no crackles or wheezing  CARDIOVASCULAR:  Normal apical impulse, regular rate and rhythm, normal S1 and S2, no S3 or S4, and no murmur noted  Data    CBC with Differential:    Lab Results   Component Value Date    WBC 20.3 04/19/2021    RBC 3.25 04/19/2021    HGB 9.5 04/19/2021    HCT 29.5 04/19/2021     04/19/2021    MCV 90.8 04/19/2021    MCH 29.2 04/19/2021    MCHC 32.2 04/19/2021    RDW 16.2 04/19/2021    LYMPHOPCT 8.9 04/19/2021    MONOPCT 3.2 04/19/2021    BASOPCT 0.2 04/19/2021    MONOSABS 0.65 04/19/2021    LYMPHSABS 1.81 04/19/2021    EOSABS 0.02 04/19/2021    BASOSABS 0.04 04/19/2021     CMP:    Lab Results   Component Value Date     04/18/2021    K 3.5 04/18/2021    K 3.7 04/16/2021    CL 98 04/18/2021    CO2 24 04/18/2021    BUN 14 04/18/2021    CREATININE 0.9 04/18/2021    GFRAA >60 04/18/2021    LABGLOM >60 04/18/2021    GLUCOSE 105 04/18/2021    GLUCOSE 90 03/28/2012    PROT 5.6 04/16/2021    LABALBU 3.4 04/16/2021    CALCIUM 8.7 04/18/2021    BILITOT 0.6 04/16/2021    ALKPHOS 55 04/16/2021    AST 20 04/16/2021    ALT 16 04/16/2021     PT/INR:    Lab Results   Component Value Date    PROTIME 12.7 04/16/2021    INR 1.2 04/16/2021       ASSESSMENT AND PLAN      Urinary retention most likely from prostatitis which caused the fever we will continue present antibiotics until cultures come out    Ischemic stroke [facility])  Plan: Stable          Electronically signed by Denis Borges MD on 4/19/2021 at 8:12 AM

## 2021-04-19 NOTE — PROGRESS NOTES
Call place to Dr. Enmanuel Weiss office. Message left to call facility r/t results of blood culture #1 bottle.

## 2021-04-20 LAB
ALBUMIN SERPL-MCNC: 3.1 G/DL (ref 3.5–5.2)
ALP BLD-CCNC: 52 U/L (ref 40–129)
ALT SERPL-CCNC: 20 U/L (ref 0–40)
ANION GAP SERPL CALCULATED.3IONS-SCNC: 9 MMOL/L (ref 7–16)
AST SERPL-CCNC: 27 U/L (ref 0–39)
BASOPHILS ABSOLUTE: 0.04 E9/L (ref 0–0.2)
BASOPHILS RELATIVE PERCENT: 0.4 % (ref 0–2)
BILIRUB SERPL-MCNC: 1 MG/DL (ref 0–1.2)
BUN BLDV-MCNC: 11 MG/DL (ref 8–23)
CALCIUM SERPL-MCNC: 8.1 MG/DL (ref 8.6–10.2)
CHLORIDE BLD-SCNC: 100 MMOL/L (ref 98–107)
CO2: 27 MMOL/L (ref 22–29)
CREAT SERPL-MCNC: 0.9 MG/DL (ref 0.7–1.2)
EOSINOPHILS ABSOLUTE: 0.15 E9/L (ref 0.05–0.5)
EOSINOPHILS RELATIVE PERCENT: 1.5 % (ref 0–6)
GFR AFRICAN AMERICAN: >60
GFR NON-AFRICAN AMERICAN: >60 ML/MIN/1.73
GLUCOSE BLD-MCNC: 86 MG/DL (ref 74–99)
HCT VFR BLD CALC: 30.7 % (ref 37–54)
HEMOGLOBIN: 9.7 G/DL (ref 12.5–16.5)
IMMATURE GRANULOCYTES #: 0.06 E9/L
IMMATURE GRANULOCYTES %: 0.6 % (ref 0–5)
LYMPHOCYTES ABSOLUTE: 1.64 E9/L (ref 1.5–4)
LYMPHOCYTES RELATIVE PERCENT: 16.4 % (ref 20–42)
MCH RBC QN AUTO: 28.7 PG (ref 26–35)
MCHC RBC AUTO-ENTMCNC: 31.6 % (ref 32–34.5)
MCV RBC AUTO: 90.8 FL (ref 80–99.9)
MONOCYTES ABSOLUTE: 0.72 E9/L (ref 0.1–0.95)
MONOCYTES RELATIVE PERCENT: 7.2 % (ref 2–12)
NEUTROPHILS ABSOLUTE: 7.42 E9/L (ref 1.8–7.3)
NEUTROPHILS RELATIVE PERCENT: 73.9 % (ref 43–80)
PDW BLD-RTO: 16.3 FL (ref 11.5–15)
PLATELET # BLD: 282 E9/L (ref 130–450)
PMV BLD AUTO: 9.9 FL (ref 7–12)
POTASSIUM SERPL-SCNC: 3.5 MMOL/L (ref 3.5–5)
RBC # BLD: 3.38 E12/L (ref 3.8–5.8)
SODIUM BLD-SCNC: 136 MMOL/L (ref 132–146)
TOTAL PROTEIN: 5.4 G/DL (ref 6.4–8.3)
WBC # BLD: 10 E9/L (ref 4.5–11.5)

## 2021-04-20 PROCEDURE — 97530 THERAPEUTIC ACTIVITIES: CPT

## 2021-04-20 PROCEDURE — 36415 COLL VENOUS BLD VENIPUNCTURE: CPT

## 2021-04-20 PROCEDURE — 2580000003 HC RX 258: Performed by: SPECIALIST

## 2021-04-20 PROCEDURE — 97110 THERAPEUTIC EXERCISES: CPT

## 2021-04-20 PROCEDURE — 97130 THER IVNTJ EA ADDL 15 MIN: CPT

## 2021-04-20 PROCEDURE — 6370000000 HC RX 637 (ALT 250 FOR IP): Performed by: PHYSICAL MEDICINE & REHABILITATION

## 2021-04-20 PROCEDURE — 97535 SELF CARE MNGMENT TRAINING: CPT

## 2021-04-20 PROCEDURE — 80053 COMPREHEN METABOLIC PANEL: CPT

## 2021-04-20 PROCEDURE — 6360000002 HC RX W HCPCS: Performed by: SPECIALIST

## 2021-04-20 PROCEDURE — 6370000000 HC RX 637 (ALT 250 FOR IP): Performed by: INTERNAL MEDICINE

## 2021-04-20 PROCEDURE — 85025 COMPLETE CBC W/AUTO DIFF WBC: CPT

## 2021-04-20 PROCEDURE — 97129 THER IVNTJ 1ST 15 MIN: CPT

## 2021-04-20 PROCEDURE — 6360000002 HC RX W HCPCS: Performed by: INTERNAL MEDICINE

## 2021-04-20 PROCEDURE — 2580000003 HC RX 258

## 2021-04-20 PROCEDURE — 1280000000 HC REHAB R&B

## 2021-04-20 RX ORDER — TAMSULOSIN HYDROCHLORIDE 0.4 MG/1
0.4 CAPSULE ORAL NIGHTLY
Status: DISCONTINUED | OUTPATIENT
Start: 2021-04-20 | End: 2021-04-20

## 2021-04-20 RX ADMIN — MEROPENEM 1000 MG: 1 INJECTION, POWDER, FOR SOLUTION INTRAVENOUS at 09:05

## 2021-04-20 RX ADMIN — ATORVASTATIN CALCIUM 80 MG: 80 TABLET, FILM COATED ORAL at 08:08

## 2021-04-20 RX ADMIN — BACLOFEN 10 MG: 10 TABLET ORAL at 20:24

## 2021-04-20 RX ADMIN — TAMSULOSIN HYDROCHLORIDE 0.4 MG: 0.4 CAPSULE ORAL at 20:24

## 2021-04-20 RX ADMIN — LOSARTAN POTASSIUM 50 MG: 50 TABLET, FILM COATED ORAL at 08:07

## 2021-04-20 RX ADMIN — ASPIRIN 81 MG CHEWABLE TABLET 81 MG: 81 TABLET CHEWABLE at 08:08

## 2021-04-20 RX ADMIN — BACLOFEN 10 MG: 10 TABLET ORAL at 08:07

## 2021-04-20 RX ADMIN — BISMUTH SUBSALICYLATE 30 ML: 525 SUSPENSION ORAL at 18:04

## 2021-04-20 RX ADMIN — FLUTICASONE PROPIONATE 2 SPRAY: 50 SPRAY, METERED NASAL at 08:07

## 2021-04-20 RX ADMIN — ENOXAPARIN SODIUM 40 MG: 40 INJECTION SUBCUTANEOUS at 08:08

## 2021-04-20 RX ADMIN — CLOPIDOGREL 75 MG: 75 TABLET, FILM COATED ORAL at 08:07

## 2021-04-20 RX ADMIN — POLYETHYLENE GLYCOL 3350 17 G: 17 POWDER, FOR SOLUTION ORAL at 08:07

## 2021-04-20 RX ADMIN — SODIUM CHLORIDE, PRESERVATIVE FREE 10 ML: 5 INJECTION INTRAVENOUS at 01:18

## 2021-04-20 RX ADMIN — MEROPENEM 1000 MG: 1 INJECTION, POWDER, FOR SOLUTION INTRAVENOUS at 17:34

## 2021-04-20 RX ADMIN — BACLOFEN 10 MG: 10 TABLET ORAL at 14:00

## 2021-04-20 RX ADMIN — ACETAMINOPHEN 650 MG: 325 TABLET ORAL at 08:44

## 2021-04-20 RX ADMIN — MEROPENEM 1000 MG: 1 INJECTION, POWDER, FOR SOLUTION INTRAVENOUS at 03:01

## 2021-04-20 RX ADMIN — FERROUS SULFATE TAB 325 MG (65 MG ELEMENTAL FE) 325 MG: 325 (65 FE) TAB at 08:08

## 2021-04-20 RX ADMIN — METOPROLOL TARTRATE 50 MG: 50 TABLET, FILM COATED ORAL at 20:24

## 2021-04-20 RX ADMIN — METOPROLOL TARTRATE 50 MG: 50 TABLET, FILM COATED ORAL at 08:08

## 2021-04-20 ASSESSMENT — PAIN SCALES - GENERAL: PAINLEVEL_OUTOF10: 5

## 2021-04-20 NOTE — PROGRESS NOTES
Occupational Therapy  OCCUPATIONAL THERAPY DAILY NOTE    Date:2021  Patient Name: Pérez Thompson  MRN: [id number]  : 1950  Room: [address] [place] [address]     Diagnosis: CVA- RMCA  Precautions: Fall Risk & min [place]    Functional Assessment:   Date Status AE  Comments   Feeding 21 s/u     Grooming 21 Min A           Oral Care 21      Bathing 21 Ub: Min A  LB: Mod A     UB Dressing 21 Min A     LB Dressing 21 Mod A reacher Seated on commode pt doffed shorts and shoes. Pt donned brief with assistance manage around L foot. To don shorts pt able to thread BLEs. Assist to pull brief and shorts over hips while standing using platform rollator. Footwear 21 Mod/max A Reacher sock aid    Toileting 21 Min A  Assistance to pull pants over hips. Homemaking         Functional Transfers / Balance:   Date Status DME  Comments   Sit Balance 21 SBA     Stand Balance 21 CGA     [] Tub  [x] Shower   Transfer 21 Min A Tub bench, Platform rollator    Commode   Transfer 21 CGA Platofrm Rollator, 3 in 1     Functional   Mobility 21 CGA Platform rollator    Other:         Functional Exercises / Activity:  Schedule activity with focus on problem solving and sequencing. Pt able to answer questions with Min A for problem solving. Mod resistive rex bar X 25 reps on 4 planes to increase BUE forearm, wrist and  strength and BUE coordination for independence with ADLs. Guillermo box with 3# weight X 30 reps on 3 planes to increase stand balance and endurance with SBA for safety. Pt required seated break d/t knee pain. Sensory / Neuromuscular Re-Education:      Cognitive Skills:   Status Comments   Problem   Solving fair     Memory fair     Sequencing fair     Safety fair       Visual Perception:    Education:  Pt educated on hand placement and safety during functional transfers. .   [] Family teach completed on:    Pain Level: no c/o pain this date.     Additional Notes: Patient has made good  progress during treatment sessions toward set goals. Therapy emphasis to obtain goals:Strengthening, Gait Training, Patient/Caregiver Education & Training, Home Management Training, ROM, Equipment Evaluation, Education, & procurement, Balance Training, Neuromuscular Re-education, Functional Mobility Training, Cognitive Reorientation, Positioning, Endurance Training, Safety Education & Training, Self-Care / ADL    [x] Continue with current OT Plan of care. [] Prepare for Discharge     DISCHARGE RECOMMENDATIONS  Recommended DME:    Post Discharge Care:   []Home Independently  []Home with 24hr Care / Supervision []Home with Partial Supervision []Home with Home Health OT []Home with Out Pt OT []Other: ___   Comments:         Time in Time out Tx Time Breakdown  Variance:   First Session  10:45 11:30 [] Individual Tx-  [x] Concurrent Tx - 45 Mins  [] Co-Tx -   [] Group Tx -   [] Time Missed -     Second Session 1:45 2:30 [x] Individual Tx- 45 Mins  [] Concurrent Tx -  [] Co-Tx -   [] Group Tx -   [] Time Missed -     Third Session    [] Individual Tx-   [] Concurrent Tx -  [] Co-Tx -   [] Group Tx -   [] Time Missed -         Total Tx Time 90 Mins      Katarina Cain QUINONES/L [id number]  I have read the above note and agree with the documentation.   Prince Fung OTR/L [id number]

## 2021-04-20 NOTE — CONSULTS
[address] [place] [address] [facility]    Consultation Note     Admit Date: 4/15/2021  6:41 PM    Reason for Consult:   leukocytosis    Attending Physician:  Tisha Crane MD     Chief Complaint:     HISTORY OF PRESENT ILLNESS:   The patient is a 66 y.o.  man not known to the Infectious Diseases service. The patient is admitted to rehab after CVA. He developed elevated WBC and  Urinary retention. [employer] He was also noted to have fever. He was started on doxy and ceftriaxone. Chest xray   Today shows rt lower lobe pneumonia. Pt had oral intake adjusted c additives post CVA. He  presents with PMHx of CVA(L MCA in 2016 received tPA at that time) with no residual effects, multiple TIAs on ASA every other day for intolerance and plavix and statin, HLD. 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 ECHO on 4/13 shows EF 45-50% with stage I DD, mild aortic stenosis, no PFO or thrombus. On 4-18 blood were drawn and 1 out of 2 sets has Staph. WBC was 21K and  Bun/Cr was 14/0.9.   ID consult for treatment             Past Medical History:        Diagnosis Date    CAD (coronary artery disease)     Cerebral artery occlusion with cerebral infarction ([address]     Hyperlipidemia     Hypertension     MI, old      Past Surgical History:        Procedure Laterality Date    CORONARY ANGIOPLASTY WITH STENT PLACEMENT      INSERTABLE CARDIAC MONITOR      reveal linq cardiac monitor    IR MECHANICAL ART THROMBECTOMY INTRACRANIAL  4/13/2021    IR MECHANICAL ART THROMBECTOMY INTRACRANIAL 4/13/2021 Akhil Perry MD [facility]    OTHER SURGICAL HISTORY  07/12/2016    Dr. Kim Meyer- LINQ insertion     Current Medications:   Scheduled Meds:   losartan  50 mg Oral Daily    Social History Narrative    None     Family History:   History reviewed. No pertinent family history. . Otherwise non-pertinent to the chief complaint. REVIEW OF SYSTEMS:    CONSTITUTIONAL:  No chills, fevers or night sweats. No loss of weight. EYES:  No double vision or drainage from eyes, ears or throat. HEENT:  No neck stiffness. No dysphagia. No drainage from eyes, ears or throat  RESPIRATORY:  No cough, productive sputum or hemoptysis. CARDIOVASCULAR:  No chest pain, palpitations, orthopnea or dyspnea on exertion. GASTROINTESTINAL: + nausea,- vomiting, diarrhea or constipation or hematochezia   GENITOURINARY:  No frequency burning dysuria or hematuria. INTEGUMENT/BREAST:  No rash or breast masses. HEMATOLOGIC/LYMPHATIC:  No lymphadenopathy or blood dyscrasics. ALLERGIC/IMMUNOLOGIC:  No anaphylaxis. ENDOCRINE:  No polyuria or polydipsia or temperature intolerance. MUSCULOSKELETAL:  No myalgia or arthralgia. Full ROM. NEUROLOGICAL:  No focal motor sensory deficit. BEHAVIOR/PSYCH:  No psychosis. PHYSICAL EXAM:    Vitals:    /74   Pulse 88   Temp 97.5 °F (36.4 °C) (Temporal)   Resp 16   Ht 6' 1\" (1.854 m)   Wt 270 lb 6.4 oz (122.7 kg)   SpO2 92%   BMI 35.67 kg/m²   Constitutional: The patient is awake, alert, and oriented. Skin: Warm and dry. No rashes were noted. No jaundice. HEENT: Eyes show round, and reactive pupils. Moist mucous membranes, no ulcerations, no thrush. Neck: Supple to movements. No lymphadenopathy. Chest: No use of accessory muscles to breathe. Symmetrical expansion. Auscultation reveals no wheezing, crackles, or rhonchi. Cardiovascular: S1 and S2 are rhythmic and regular. No murmurs appreciated. Abdomen: Positive bowel sounds to auscultation. Benign to palpation. No masses felt. No hepatosplenomegaly. Genitourinary: male retention - barriga  Extremities: No clubbing, no cyanosis, no edema.   Musculoskeletal: E/S  Neurological: Status post CVA generally weak 3 out of 5 strength but no focal  Lines: peripheral      CBC+dif:  Recent Labs     04/18/21  0604 04/18/21  1232 04/19/21  0359   WBC 8.3 21.2* 20.3*   HGB 9.6* 10.5* 9.5*   HCT 29.8* 32.9* 29.5*   MCV 91.1 89.4 90.8    275 263   NEUTROABS 4.17 18.52* 17.53*     No results found for: CRP  No results found for: CRPHS  No results found for: SEDRATE  Lab Results   Component Value Date    ALT 16 04/16/2021    AST 20 04/16/2021    ALKPHOS 55 04/16/2021    BILITOT 0.6 04/16/2021     Lab Results   Component Value Date     04/18/2021    K 3.5 04/18/2021    K 3.7 04/16/2021    CL 98 04/18/2021    CO2 24 04/18/2021    BUN 14 04/18/2021    CREATININE 0.9 04/18/2021    GFRAA >60 04/18/2021    LABGLOM >60 04/18/2021    GLUCOSE 105 04/18/2021    GLUCOSE 90 03/28/2012    PROT 5.6 04/16/2021    LABALBU 3.4 04/16/2021    CALCIUM 8.7 04/18/2021    BILITOT 0.6 04/16/2021    ALKPHOS 55 04/16/2021    AST 20 04/16/2021    ALT 16 04/16/2021       Lab Results   Component Value Date    PROTIME 12.7 04/16/2021    INR 1.2 04/16/2021       No results found for: TSH    Lab Results   Component Value Date    COLORU Yellow 04/18/2021    PHUR 7.5 04/18/2021    WBCUA 1-3 04/18/2021    RBCUA >20 04/18/2021    BACTERIA FEW 04/18/2021    CLARITYU Clear 04/18/2021    SPECGRAV 1.015 04/18/2021    LEUKOCYTESUR TRACE 04/18/2021    UROBILINOGEN 1.0 04/18/2021    BILIRUBINUR Negative 04/18/2021    BLOODU LARGE 04/18/2021    GLUCOSEU Negative 04/18/2021       No results found for: HJX6RZX, BEART, D2KXKQGF, PHART, THGBART, HWN7TKY, PO2ART, UOL9FFL  Radiology:  XR CHEST (2 VW)   Final Result   Trace strandy densities at the right lung base which may be due to   atelectasis or scarring. XR CHEST PORTABLE   Final Result   Cardiomegaly with atelectasis/infiltrates lung bases concerning for pneumonia.          XR CHEST LATERAL    (Results Pending)       Microbiology:  Pending  Recent Labs     04/18/21  1232   BC Gram stain performed from blood culture bottle media  Gram positive cocci in clusters  *     No results for input(s): ORG in the last 72 hours. Recent Labs     04/18/21  1232   BLOODCULT2 24 Hours no growth     No results for input(s): STREPNEUMAGU in the last 72 hours. No results for input(s): LP1UAG in the last 72 hours. No results for input(s): ASO in the last 72 hours. No results for input(s): CULTRESP in the last 72 hours. Assessment:  · Aspiration pneumonia versus atelectasis in the right lung  · Urinary retention   · Leucocytosis assoc c above   · Aerococcus in the blood probably contaminant    Plan:    · Cont c merrem -as a nosocomial aspiration  · Check cultures  · Baseline ESR, CRP  · Monitor labs  · Will follow with you    Thank you for having us see this patient in consultation. I will be discussing this case with the treating physicians.       Electronically signed by Huy Capps MD on 4/19/2021 at 8:33 PM

## 2021-04-20 NOTE — PROGRESS NOTES
Late Entry: Patient given a signed copy of The Important Message from [employer].  Lazarus Maxwell 10:01 AM  4/20/2021

## 2021-04-20 NOTE — PROGRESS NOTES
6--Modified Independent                       Long Term Goal          6--Modified Independent              Receptive                          Current Status             5--Supervision               Short Term Goal         6--Modified Independent                       Long Term Goal          6--Modified Independent              Expressive                          Current Status             5--Supervision               Short Term Goal         6--Modified Independent                       Long Term Goal          6--Modified Independent              Social Interaction                          Current Status             4--Minimal Assistance               Short Term Goal         5--Supervision               Long Term Goal          5--Supervision                                                     Problem Solving                          Current Status             4--Minimal Assistance               Short Term Goal         5--Supervision               Long Term Goal          5--Supervision                  Memory                          Current Status             3--Moderate Assistance                        Short Term Goal         4--Minimal Assistance               Long Term Goal          5--Supervision                             SWALLOWING:      Diet:   Recommend advance to regular consistency solids. Continue thin liquids      Supervision is no longer required during mealtimes. LANGUAGE:     Benefits from increased time and occasional cues. Per patient, he has decreased auditory acuity and discrimination; reports issue as longstanding. COGNITION:        Completed functional cognitive activity with focus on attention, auditory comprehension, memory, and processing speed. Patient was asked to listen to brief, informative voicemails and then answer questions regarding the information presented. Each voicemail was ~2-3 sentences in length and presented at a natural speaking rate.  SLP asked questions about information explicitly stated in the voicemail. Task completed with 40% accuracy given benefit of multiple choice format during questioning. Completed concentration / memory match up game in which patient was asked to match pictures of common items in a field of 6 overturned cards. Task completed with 80% accuracy. Patient benefited from increased time and mod v/c. Patient was able to recall information from short stories with 80% accuracy given benefit of multiple choice format for responses. Completed activity which required patient to refer to a TV schedule with 7 channels and shows listed from 7:00pm-9:30pm. Patient was asked to answer questions based on the schedule provided (e.g. \"You enjoy game shows. What channel might you tune in to?\" \"It's 7:00pm. Can you see all of the movie 'Treks'? \"). Patient completed task with 70% accuracy given consistent min-mod v/c. Safety:  Fair-; SLP educated patient multiple times regarding need to request assistance (and not transfer self or take self to bathroom). SPEECH:     Mild dysarthria with left labiobuccal and lingual weakness. Patient feels his speech is at baseline. SP recommended after discharge: Yes pending discussion with patient's wife (patient feels he is at baseline from previous CVAs ~3 and ~5 years ago). Supervision recommended at discharge: 24 hour    Will continue SP intervention as per previously established POC.     Minute Tracking:    Individual therapy:   45 minutes  Concurrent therapy:    0 minutes  Group therapy:     0 minutes  Co-treatment therapy:   30 minutes    Total minutes for 4/20/2021: 75 minutes

## 2021-04-20 NOTE — PROGRESS NOTES
Department of Internal Larisa Gonzalez M.D.  Progress Note      SUBJECTIVE:  Starting to urinate better ;he had 4 straight catheters and does not want another    OBJECTIVE  abd soft    Medications    Current Facility-Administered Medications: losartan (COZAAR) tablet 50 mg, 50 mg, Oral, Daily  tamsulosin (FLOMAX) capsule 0.4 mg, 0.4 mg, Oral, Nightly  meropenem (MERREM) 1,000 mg in sodium chloride 0.9 % 100 mL IVPB (mini-bag), 1,000 mg, Intravenous, Q8H  ferrous sulfate (IRON 325) tablet 325 mg, 325 mg, Oral, Daily with breakfast  bisacodyl (DULCOLAX) suppository 10 mg, 10 mg, Rectal, Daily PRN  fluticasone (FLONASE) 50 MCG/ACT nasal spray 2 spray, 2 spray, Each Nostril, Daily  acetaminophen (TYLENOL) tablet 650 mg, 650 mg, Oral, Q4H PRN  aspirin chewable tablet 81 mg, 81 mg, Oral, Daily  atorvastatin (LIPITOR) tablet 80 mg, 80 mg, Oral, Daily  baclofen (LIORESAL) tablet 10 mg, 10 mg, Oral, TID  clopidogrel (PLAVIX) tablet 75 mg, 75 mg, Oral, Daily  enoxaparin (LOVENOX) injection 40 mg, 40 mg, Subcutaneous, Daily  metoprolol tartrate (LOPRESSOR) tablet 50 mg, 50 mg, Oral, BID  polyethylene glycol (GLYCOLAX) packet 17 g, 17 g, Oral, Daily  senna (SENOKOT) tablet 8.6 mg, 1 tablet, Oral, Nightly  magnesium hydroxide (MILK OF MAGNESIA) 400 MG/5ML suspension 30 mL, 30 mL, Oral, Daily PRN  Physical    VITALS:  /73   Pulse 87   Temp 98.6 °F (37 °C) (Oral)   Resp 18   Ht 6' 1\" (1.854 m)   Wt 270 lb 6.4 oz (122.7 kg)   SpO2 92%   BMI 35.67 kg/m²   24HR INTAKE/OUTPUT:      Intake/Output Summary (Last 24 hours) at 4/20/2021 0805  Last data filed at 4/20/2021 0425  Gross per 24 hour   Intake 1060 ml   Output 1225 ml   Net -165 ml     LUNGS:  No increased work of breathing, good air exchange, clear to auscultation bilaterally, no crackles or wheezing  CARDIOVASCULAR:  Normal apical impulse, regular rate and rhythm, normal S1 and S2, no S3 or S4, and no murmur noted  Data    CBC with Differential:    Lab Results   Component Value Date    WBC 10.0 04/20/2021    RBC 3.38 04/20/2021    HGB 9.7 04/20/2021    HCT 30.7 04/20/2021     04/20/2021    MCV 90.8 04/20/2021    MCH 28.7 04/20/2021    MCHC 31.6 04/20/2021    RDW 16.3 04/20/2021    LYMPHOPCT 16.4 04/20/2021    MONOPCT 7.2 04/20/2021    BASOPCT 0.4 04/20/2021    MONOSABS 0.72 04/20/2021    LYMPHSABS 1.64 04/20/2021    EOSABS 0.15 04/20/2021    BASOSABS 0.04 04/20/2021     CMP:    Lab Results   Component Value Date     04/20/2021    K 3.5 04/20/2021    K 3.7 04/16/2021     04/20/2021    CO2 27 04/20/2021    BUN 11 04/20/2021    CREATININE 0.9 04/20/2021    GFRAA >60 04/20/2021    LABGLOM >60 04/20/2021    GLUCOSE 86 04/20/2021    GLUCOSE 90 03/28/2012    PROT 5.4 04/20/2021    LABALBU 3.1 04/20/2021    CALCIUM 8.1 04/20/2021    BILITOT 1.0 04/20/2021    ALKPHOS 52 04/20/2021    AST 27 04/20/2021    ALT 20 04/20/2021     PT/INR:    Lab Results   Component Value Date    PROTIME 12.7 04/16/2021    INR 1.2 04/16/2021       ASSESSMENT AND PLAN      Active Problems:  Prostatitis with urinary retention   Resolving add flomax        Ischemic stroke [facility])  Plan: rehab          Electronically signed by Marge Rock MD on 4/20/2021 at 8:05 AM

## 2021-04-21 LAB
ORGANISM: ABNORMAL
URINE CULTURE, ROUTINE: ABNORMAL
URINE CULTURE, ROUTINE: ABNORMAL

## 2021-04-21 PROCEDURE — 97530 THERAPEUTIC ACTIVITIES: CPT

## 2021-04-21 PROCEDURE — 97110 THERAPEUTIC EXERCISES: CPT

## 2021-04-21 PROCEDURE — 6360000002 HC RX W HCPCS: Performed by: SPECIALIST

## 2021-04-21 PROCEDURE — 1280000000 HC REHAB R&B

## 2021-04-21 PROCEDURE — 51701 INSERT BLADDER CATHETER: CPT

## 2021-04-21 PROCEDURE — 97130 THER IVNTJ EA ADDL 15 MIN: CPT

## 2021-04-21 PROCEDURE — 6370000000 HC RX 637 (ALT 250 FOR IP): Performed by: INTERNAL MEDICINE

## 2021-04-21 PROCEDURE — 97129 THER IVNTJ 1ST 15 MIN: CPT

## 2021-04-21 PROCEDURE — 2580000003 HC RX 258: Performed by: SPECIALIST

## 2021-04-21 PROCEDURE — 6360000002 HC RX W HCPCS: Performed by: INTERNAL MEDICINE

## 2021-04-21 PROCEDURE — 97535 SELF CARE MNGMENT TRAINING: CPT

## 2021-04-21 RX ADMIN — MEROPENEM 1000 MG: 1 INJECTION, POWDER, FOR SOLUTION INTRAVENOUS at 10:28

## 2021-04-21 RX ADMIN — BACLOFEN 10 MG: 10 TABLET ORAL at 08:45

## 2021-04-21 RX ADMIN — BACLOFEN 10 MG: 10 TABLET ORAL at 19:45

## 2021-04-21 RX ADMIN — METOPROLOL TARTRATE 50 MG: 50 TABLET, FILM COATED ORAL at 19:44

## 2021-04-21 RX ADMIN — LOSARTAN POTASSIUM 50 MG: 50 TABLET, FILM COATED ORAL at 08:45

## 2021-04-21 RX ADMIN — FLUTICASONE PROPIONATE 2 SPRAY: 50 SPRAY, METERED NASAL at 14:34

## 2021-04-21 RX ADMIN — BACLOFEN 10 MG: 10 TABLET ORAL at 14:32

## 2021-04-21 RX ADMIN — METOPROLOL TARTRATE 50 MG: 50 TABLET, FILM COATED ORAL at 08:45

## 2021-04-21 RX ADMIN — ATORVASTATIN CALCIUM 80 MG: 80 TABLET, FILM COATED ORAL at 08:45

## 2021-04-21 RX ADMIN — BISMUTH SUBSALICYLATE 30 ML: 525 SUSPENSION ORAL at 19:45

## 2021-04-21 RX ADMIN — ACETAMINOPHEN 650 MG: 325 TABLET ORAL at 23:31

## 2021-04-21 RX ADMIN — ASPIRIN 81 MG CHEWABLE TABLET 81 MG: 81 TABLET CHEWABLE at 08:45

## 2021-04-21 RX ADMIN — MEROPENEM 1000 MG: 1 INJECTION, POWDER, FOR SOLUTION INTRAVENOUS at 01:44

## 2021-04-21 RX ADMIN — ACETAMINOPHEN 650 MG: 325 TABLET ORAL at 12:51

## 2021-04-21 RX ADMIN — ENOXAPARIN SODIUM 40 MG: 40 INJECTION SUBCUTANEOUS at 08:45

## 2021-04-21 RX ADMIN — MEROPENEM 1000 MG: 1 INJECTION, POWDER, FOR SOLUTION INTRAVENOUS at 18:35

## 2021-04-21 RX ADMIN — CLOPIDOGREL 75 MG: 75 TABLET, FILM COATED ORAL at 08:45

## 2021-04-21 RX ADMIN — FERROUS SULFATE TAB 325 MG (65 MG ELEMENTAL FE) 325 MG: 325 (65 FE) TAB at 10:28

## 2021-04-21 RX ADMIN — TAMSULOSIN HYDROCHLORIDE 0.4 MG: 0.4 CAPSULE ORAL at 19:44

## 2021-04-21 ASSESSMENT — PAIN DESCRIPTION - PAIN TYPE: TYPE: ACUTE PAIN

## 2021-04-21 ASSESSMENT — PAIN SCALES - GENERAL
PAINLEVEL_OUTOF10: 6
PAINLEVEL_OUTOF10: 3
PAINLEVEL_OUTOF10: 4

## 2021-04-21 ASSESSMENT — PAIN DESCRIPTION - ORIENTATION: ORIENTATION: UPPER

## 2021-04-21 ASSESSMENT — PAIN DESCRIPTION - DESCRIPTORS: DESCRIPTORS: ACHING

## 2021-04-21 ASSESSMENT — PAIN DESCRIPTION - LOCATION: LOCATION: LEG

## 2021-04-21 NOTE — PROGRESS NOTES
[address] [place] [address] [facility]  Progress Note    Chief complaint: Fever and urinary retention    SUBJECTIVE:  Patient is tolerating medications. No reported adverse drug reactions. No nausea, vomiting, diarrhea. Review of systems:  As stated above in the chief complaint, otherwise negative. Medications:  Scheduled Meds:   losartan  50 mg Oral Daily    tamsulosin  0.4 mg Oral Nightly    meropenem  1,000 mg Intravenous Q8H    ferrous sulfate  325 mg Oral Daily with breakfast    fluticasone  2 spray Each Nostril Daily    aspirin  81 mg Oral Daily    atorvastatin  80 mg Oral Daily    baclofen  10 mg Oral TID    clopidogrel  75 mg Oral Daily    enoxaparin  40 mg Subcutaneous Daily    metoprolol tartrate  50 mg Oral BID    polyethylene glycol  17 g Oral Daily    senna  1 tablet Oral Nightly     Continuous Infusions:  PRN Meds:bismuth subsalicylate, bisacodyl, acetaminophen, magnesium hydroxide    OBJECTIVE:  /74   Pulse 76   Temp 97.7 °F (36.5 °C) (Oral)   Resp 18   Ht 6' 1\" (1.854 m)   Wt 270 lb 6.4 oz (122.7 kg)   SpO2 94%   BMI 35.67 kg/m²   Temp  Av.7 °F (36.5 °C)  Min: 97.7 °F (36.5 °C)  Max: 97.7 °F (36.5 °C)  Constitutional: The patient is awake, alert, and oriented. Skin: Warm and dry. No rashes were noted. HEENT: Round and reactive pupils. Moist mucous membranes. No ulcerations or thrush. Neck: Supple to movements. Chest: No use of accessory muscles to breathe. Symmetrical expansion. No wheezing, crackles or rhonchi. Cardiovascular: S1 and S2 are rhythmic and regular. No murmurs appreciated. Abdomen: Positive bowel sounds to auscultation. Benign to palpation. No masses felt. No hepatosplenomegaly. Genitourinary: Male reviewed  Extremities: No clubbing, no cyanosis, no edema.   Lines: peripheral    Laboratory and Tests Review:  Lab Results   Component Value Date    WBC 10.0 2021    WBC 20.3 (H) 2021    WBC 21.2 (H) 2021 HGB 9.7 (L) 04/20/2021    HCT 30.7 (L) 04/20/2021    MCV 90.8 04/20/2021     04/20/2021     Lab Results   Component Value Date    NEUTROABS 7.42 (H) 04/20/2021    NEUTROABS 17.53 (H) 04/19/2021    NEUTROABS 18.52 (H) 04/18/2021     No results found for: CRPHS  Lab Results   Component Value Date    ALT 20 04/20/2021    AST 27 04/20/2021    ALKPHOS 52 04/20/2021    BILITOT 1.0 04/20/2021     Lab Results   Component Value Date     04/20/2021    K 3.5 04/20/2021    K 3.7 04/16/2021     04/20/2021    CO2 27 04/20/2021    BUN 11 04/20/2021    CREATININE 0.9 04/20/2021    CREATININE 0.9 04/18/2021    CREATININE 0.9 04/16/2021    GFRAA >60 04/20/2021    LABGLOM >60 04/20/2021    GLUCOSE 86 04/20/2021    GLUCOSE 90 03/28/2012    PROT 5.4 04/20/2021    LABALBU 3.1 04/20/2021    CALCIUM 8.1 04/20/2021    BILITOT 1.0 04/20/2021    ALKPHOS 52 04/20/2021    AST 27 04/20/2021    ALT 20 04/20/2021     No results found for: CRP  No results found for: [address]  Radiology:      Microbiology:   Lab Results   Component Value Date    [facility]  04/18/2021     Gram stain performed from blood culture bottle media  Gram positive cocci in [facility]  04/18/2021     Identification to follow  This isolate is often of questionable clinical significance. Validated  susceptibility testing methods do not exist for this isolate. This organism was isolated in one set. Susceptibility testing is not routinely done as this  organism frequently represents skin contamination.       ORG Aerococcus urinae 04/18/2021    ORG Aerococcus urinae 04/18/2021     Lab Results   Component Value Date    BLOODCULT2 24 Hours no growth 04/18/2021    ORG Aerococcus urinae 04/18/2021    ORG Aerococcus urinae 04/18/2021     No results found for: WNDABS  No results found for: RESPSMEAR  No results found for: MPNEUMO, CLAMYDCU, LABLEGI, AFBCX, FUNGSM, LABFUNG  No results found for: CULTRESP  No results found for: CXCATHTIP  No results found for: BFCS  No results found for: CXSURG  Urine Culture, Routine   Date Value Ref Range Status   04/18/2021 <10,000 CFU/mL  Gram positive organism   (A)  Final   04/18/2021 >100,000 CFU/ml  Final     [facility] Culture Only   Date Value Ref Range Status   06/22/2016 Methicillin resistant Staph aureus not isolated  Final       ASSESSMENT:  · UTI with bacteremia associated with Aerococcus and urine manipulation    PLAN:  · Continue with ceftriaxone  · Check final cultures  · Monitor labs    Aubrey Rose  4:40 PM  4/21/2021

## 2021-04-21 NOTE — PROGRESS NOTES
Patient wife was here for visitation very unhappy with care does not think patient needs Ot or ST states he needs his oxygen on at all times during hs PT sessions is not happy with medicine r/t to his bowels. Wanted bed changed to bed A due to Bed B is to hot too close to the door and noisy. Sticky notes placed for Physician regarding end date for ATB. Bed change completed .

## 2021-04-21 NOTE — PROGRESS NOTES
Progress Note    Subjective/   66y.o. year old male on the rehab unit for stroke. Complaining of diarrhea. Requesting PeptoBismol. No chest pain or palpitations. Tolerating therapy. Objective/   VITALS:  /71   Pulse 84   Temp 98.6 °F (37 °C) (Oral)   Resp 18   Ht 6' 1\" (1.854 m)   Wt 270 lb 6.4 oz (122.7 kg)   SpO2 92%   BMI 35.67 kg/m²   24HR INTAKE/OUTPUT:      Intake/Output Summary (Last 24 hours) at 4/20/2021 2302  Last data filed at 4/20/2021 2248  Gross per 24 hour   Intake 360 ml   Output 1950 ml   Net -1590 ml     Constitutional:  Alert, awake, no apparent distress   Cardiovascular:  S1, S2 without m/r/g   Respiratory:  CTA B without w/r/r, moist cough  Abdomen: +BS, no tenderness  Ext: no pitting LE edema  Neuro: awake and alert, oriented to person and place    Functional Level      Date   Status   AE    Comments     Feeding   4/19/21   s/u             Grooming   4/16/21   Min A                   Oral Care   4/16/21                 Bathing   4/16/21   Ub: Min A    LB: Mod A           UB Dressing   4/16/21   Min A             LB Dressing   4/20/21   Mod A   reacher   Seated on commode pt doffed shorts and shoes. Pt donned brief with assistance manage around L foot. To don shorts pt able to thread BLEs. Assist to pull brief and shorts over hips while standing using platform rollator. Footwear   4/17/21   Mod/max A   Reacher sock aid         Toileting   4/20/21   Min A       Assistance to pull pants over hips. Homemaking                          Functional Transfers / Balance:      Date Status DME  Comments   Sit Balance 4/17/21   SBA       Stand Balance 4/19/21   CGA       []? Tub  [x]?  Shower   Transfer 4/19/21 Min A Tub bench, Platform rollator     Commode   Transfer 4/20/21   CGA Platofrm Rollator, 3 in 1      Functional   Mobility 4/20/21   CGA Platform rollator     Other:              Functional Exercises / Activity:  Schedule activity with focus on problem solving and sequencing. Pt able to answer questions with Min A for problem solving.      Mod resistive rex bar X 25 reps on 4 planes to increase BUE forearm, wrist and  strength and BUE coordination for independence with ADLs.    Guillermo box with 3# weight X 30 reps on 3 planes to increase stand balance and endurance with SBA for safety. Pt required seated break d/t knee pain. Sensory / Neuromuscular Re-Education:        Cognitive Skills:    Status Comments   Problem   Solving fair      Memory fair      Sequencing fair      Safety fair             Scheduled Meds:   losartan  50 mg Oral Daily    tamsulosin  0.4 mg Oral Nightly    meropenem  1,000 mg Intravenous Q8H    ferrous sulfate  325 mg Oral Daily with breakfast    fluticasone  2 spray Each Nostril Daily    aspirin  81 mg Oral Daily    atorvastatin  80 mg Oral Daily    baclofen  10 mg Oral TID    clopidogrel  75 mg Oral Daily    enoxaparin  40 mg Subcutaneous Daily    metoprolol tartrate  50 mg Oral BID    polyethylene glycol  17 g Oral Daily    senna  1 tablet Oral Nightly     Continuous Infusions:  PRN Meds:bismuth subsalicylate, bisacodyl, acetaminophen, magnesium hydroxide  I/O last 3 completed shifts: In: 360 [P.O.:360]  Out: 1950 [Urine:1950]  No intake/output data recorded. Labs reviewed  CBC:   Recent Labs     04/18/21  1232 04/19/21  0359 04/20/21  0429   WBC 21.2* 20.3* 10.0   HGB 10.5* 9.5* 9.7*    263 282     BMP:    Recent Labs     04/18/21  1232 04/20/21  0429    136   K 3.5 3.5   CL 98 100   CO2 24 27   BUN 14 11   CREATININE 0.9 0.9   GLUCOSE 105* 86     Hepatic:   Recent Labs     04/20/21  0429   AST 27   ALT 20   BILITOT 1.0   ALKPHOS 52     BNP: No results for input(s): BNP in the last 72 hours. Lipids: No results for input(s): CHOL, HDL in the last 72 hours. Invalid input(s): LDLCALCU  INR: No results for input(s): INR in the last 72 hours.     Assessment/  Patient Active Problem List: Cerebrovascular accident (CVA) due to occlusion of right middle cerebral artery (HCC)     Coronary artery disease involving native coronary artery of native heart without angina pectoris     History of myocardial infarction     Essential hypertension     Mixed hyperlipidemia     Status post placement of implantable loop recorder     Acute ischemic stroke (HCC)     History of CVA (cerebrovascular accident) without residual deficits     History of TIA (transient ischemic attack)     Acute CVA (cerebrovascular accident) ([address]     Ischemic cerebral vascular accident (CVA) due to stenosis of large extracranial artery ([address]     Red blood cell antibody positive     Ischemic stroke ([address]      Plan/  1. Pepto Bismol at patients request  2. Continue antibiotics as per Dr. Andria Burgess  3. Continue current diet  4. Continue dvt prophylaxis  5. Continue secondary stroke prevention  6.  Continue other medications without change          Ara Martinez MD

## 2021-04-21 NOTE — PROGRESS NOTES
Occupational Therapy  OCCUPATIONAL THERAPY DAILY NOTE    Date:2021  Patient Name: Kaci Antoine  MRN: [id number]  : 1950  Room: [address] [place] [address]     Diagnosis: CVA- RMCA  Precautions: Fall Risk & min [place]    Functional Assessment:   Date Status AE  Comments   Feeding 21 s/u     Grooming 21 S/u   Pt seated to wash face, brush hair, apply deodorant          Oral Care 21 S/u  Pt seated at sink to complete oral hygiene    Bathing 21 Ub: supervision   LB: MIN A Shower bench  Pt completed full shower requiring assist to bathe buttocks    UB Dressing 21 S/u  To wolf/doff pull over shirt seated   LB Dressing 21 Mod A reacher To wolf/doff underwear, short requiring assist to thread over L LE foot, pull up from floor to thighs upon standing 2* to shorts/underwear falling to floor after standing, assist to pull up short around waist on left side    Footwear 21 Mod/max A    SBA Reacher sock aid     Slip on shoes    Toileting 21 Min A  Assistance with hygiene    Homemaking         Functional Transfers / Balance:   Date Status DME  Comments   Sit Balance 21 SBA  demo'd sitting on standard toilet and shower bench    Stand Balance 21 CGA  Demo'd during stand tasks to complete LE dressing, and transfers    [] Tub  [x] Shower   Transfer 21 Min A Shower bench, w/w V/c's for safety and hand placement    Commode   Transfer 21 CGA Standard toilet, w/w V/c's for hand placement    Functional   Mobility 21 CGA W/w  Less than house hold distances in bathroom. Other:         Functional Exercises / Activity:  Pt engaged in B UE ex's focusing on strength/endurance to increase independence with transfers/mobility and ADL tasks;  7# weighted box 2 x 25 reps forward/backward, side/side;   3# pamela shoulder ladder up/down 3 x 5 reps;   2# dumb bell in all planes 2 x 10 reps;  Min resistant can do bar 2 x 15 rep 3 planes;   Power gripper 2 x 25 reps B UE;   W/c push ups 3 x 5 reps;     Sensory / Neuromuscular Re-Education:      Cognitive Skills:   Status Comments   Problem   Solving fair     Memory fair     Sequencing fair     Safety fair       Visual Perception:    Education:  Pt educated on hand placement and safety during functional transfers. .   [] Family teach completed on:    Pain Level: no c/o pain this date. Additional Notes:       Patient has made good  progress during treatment sessions toward set goals. Therapy emphasis to obtain goals:Strengthening, Gait Training, Patient/Caregiver Education & Training, Home Management Training, ROM, Equipment Evaluation, Education, & procurement, Balance Training, Neuromuscular Re-education, Functional Mobility Training, Cognitive Reorientation, Positioning, Endurance Training, Safety Education & Training, Self-Care / ADL    [x] Continue with current OT Plan of care. [] Prepare for Discharge     DISCHARGE RECOMMENDATIONS  Recommended DME:    Post Discharge Care:   []Home Independently  []Home with 24hr Care / Supervision []Home with Partial Supervision []Home with Home Health OT []Home with Out Pt OT []Other: ___   Comments:         Time in Time out Tx Time Breakdown  Variance:   First Session   [phone] [x] Individual Tx-50 min  [] Concurrent Tx -    [] Co-Tx -   [] Group Tx -   [] Time Missed -     Second Session 1:45 2:30 [x] Individual Tx- 45 Mins  [] Concurrent Tx -  [] Co-Tx -   [] Group Tx -   [] Time Missed -     Third Session    [] Individual Tx-   [] Concurrent Tx -  [] Co-Tx -   [] Group Tx -   [] Time Missed -         Total Tx Time  95 Mins      Marquis Carmona  QUINONES/L [id number]  I have read the above note and agree with the documentation.   Oscar Cowden OTR/L [id number]

## 2021-04-21 NOTE — PROGRESS NOTES
Physical Therapy  Daily Treatment Note  Evaluating Therapist: Anamaria Ashley DPT    NAME: Renu Weller  ROOM: [address]  DIAGNOSIS: Ischemic cerebral vascular accident -  R MCA  PRECAUTIONS: Falls, L hemiparesis, L inattention   PROCEDURE(S): 4/13 tPA, IR mechanical art intracranial thrombectomy    HPI: Presented on 4/13/21 with left sided weakness. He has history of prior stroke and is on asa and plavix as well as CAD, HLD, HTN, and MI. NIHSS 15. He was found to have right MCA M1 occlusion. Dr. Aba Leslie completed successful thrombectomy with TICI 3 reperfusion and received TPA. Social:  Pt lives with wife in a 1 story floor plan with 3 steps and 2 rails to enter. Pt uses a ramp and transport chair to enter/exit home. Prior to admission pt reports not doing much walking around the house and primarily used a transport chair to get around. Pt states that he uses a \"Upwalker\" to get into/out of his truck. Pt also owns a transport wheelchair, power wheelchair, and canes. Initial Evaluation  4/16/21 AM     PM    Short Term Goals Long Term Goals    Was pt agreeable to Eval/treatment? yes yes yes     Does pt have pain? L hip pain - hematoma from a fall at home L knee ache L knee ache     Bed Mobility  Rolling: SBA  Supine to sit: SBA  Sit to supine: SBA  Scooting: SBA Supine to sit: Supervision Supine to sit: Supervision Supervision Independent   Transfers Sit to stand:  Mod A  Stand to sit: Mod A  Stand pivot: Min A with [employer] and LOLI Brennan Sit to stand: Supervision  Stand to sit: Supervision  Stand pivot: SBA with [employer] Sit to stand: Supervision  Stand to sit: Supervision  Stand pivot: SBA with [employer] Min A Supervision   Ambulation   50 feet with UpWalker with Min A 50 feet x2 reps with [employer] SBA/CGA 50 feet x2 reps with [employer] SBA/ feet with AAD with SBA >150 feet with AAD with Supervision   Walking 10 feet on uneven surface 10 feet with UpWalker with Mod A NT NT 10 feet with AAD with Min A 10 feet with AAD with Supervision   Wheel Chair Mobility 25 feet Max A NT NT 50 feet  feet Mod I   Car Transfers NT - deferred today due to pt's difficulty with STS from high chair NT NT Min A Supervision   Stair negotiation: ascended and descended 1 step x2 reps with B rails with Min A - backwards descending 1 step x5 reps x2 sets with B rails SBA/CGA - backwards descending NT 4 steps with B rail with Min A 4 steps with B rail with Supervision   Curb Step:   ascended and descended NT NT NT     Picking up object off the floor Picked up a cone with Min A NT NT Will  an object with SBA Will  an object with Supervision   BLE ROM WFL WFL      BLE Strength RLE 5/5  LLE 4-/5 RLE 5/5  LLE 4-/5      Balance  Sitting: Supervision  Standing: Min/Mod A Sitting: Supervision  Standing: SBA with [employer]      Date Family Teach Completed TBA TBA      Is additional Family Teaching Needed? Y or N Y Y      Hindering Progress Weakness Weakness      PT recommended ELOS 2-3 weeks       Team's Discharge Plan        Therapist at Team Meeting          Therapeutic Exercise:   AM:   - Functional mobility as noted above in chart  - Sit<>stand x5 reps from mat table  - Recliner transfer  PM:   - Functional mobility as noted above in chart  - Sit<>stand x5 reps  - Three 4-inch box step up and over in parallel bars: x2 reps  - Standing marching in parallel bars x5/side    Additional Comments: Pt continues to report chronic L knee pain that worsened with activity today. PT sessions are focused on progressing pt's general function, strength, and endurance to progress toward goals. Pt continues to be slow moving with mobility activities. Pt requires reminders of proper sit<>stand and stand pivot technique as pt will at times forget to reach back with his hands when sitting as well as not completing a full turn before sitting down. Pt has fair- carryover with cues/education and seems to be set on doing things his own way which he has done for many years.

## 2021-04-22 LAB
ALBUMIN SERPL-MCNC: 3.6 G/DL (ref 3.5–5.2)
ALP BLD-CCNC: 62 U/L (ref 40–129)
ALT SERPL-CCNC: 25 U/L (ref 0–40)
ANION GAP SERPL CALCULATED.3IONS-SCNC: 13 MMOL/L (ref 7–16)
AST SERPL-CCNC: 27 U/L (ref 0–39)
BASOPHILS ABSOLUTE: 0.05 E9/L (ref 0–0.2)
BASOPHILS RELATIVE PERCENT: 0.5 % (ref 0–2)
BILIRUB SERPL-MCNC: 0.8 MG/DL (ref 0–1.2)
BLOOD CULTURE, ROUTINE: ABNORMAL
BUN BLDV-MCNC: 13 MG/DL (ref 6–23)
CALCIUM SERPL-MCNC: 8.9 MG/DL (ref 8.6–10.2)
CHLORIDE BLD-SCNC: 103 MMOL/L (ref 98–107)
CO2: 26 MMOL/L (ref 22–29)
CREAT SERPL-MCNC: 1.1 MG/DL (ref 0.7–1.2)
EOSINOPHILS ABSOLUTE: 0.2 E9/L (ref 0.05–0.5)
EOSINOPHILS RELATIVE PERCENT: 2.2 % (ref 0–6)
GFR AFRICAN AMERICAN: >60
GFR NON-AFRICAN AMERICAN: >60 ML/MIN/1.73
GLUCOSE BLD-MCNC: 94 MG/DL (ref 74–99)
HCT VFR BLD CALC: 32.4 % (ref 37–54)
HEMOGLOBIN: 10.1 G/DL (ref 12.5–16.5)
IMMATURE GRANULOCYTES #: 0.1 E9/L
IMMATURE GRANULOCYTES %: 1.1 % (ref 0–5)
LYMPHOCYTES ABSOLUTE: 2.31 E9/L (ref 1.5–4)
LYMPHOCYTES RELATIVE PERCENT: 25 % (ref 20–42)
MCH RBC QN AUTO: 28.6 PG (ref 26–35)
MCHC RBC AUTO-ENTMCNC: 31.2 % (ref 32–34.5)
MCV RBC AUTO: 91.8 FL (ref 80–99.9)
MONOCYTES ABSOLUTE: 0.86 E9/L (ref 0.1–0.95)
MONOCYTES RELATIVE PERCENT: 9.3 % (ref 2–12)
NEUTROPHILS ABSOLUTE: 5.72 E9/L (ref 1.8–7.3)
NEUTROPHILS RELATIVE PERCENT: 61.9 % (ref 43–80)
ORGANISM: ABNORMAL
PDW BLD-RTO: 17.2 FL (ref 11.5–15)
PLATELET # BLD: 337 E9/L (ref 130–450)
PMV BLD AUTO: 9.2 FL (ref 7–12)
POTASSIUM SERPL-SCNC: 4.1 MMOL/L (ref 3.5–5)
RBC # BLD: 3.53 E12/L (ref 3.8–5.8)
SODIUM BLD-SCNC: 142 MMOL/L (ref 132–146)
TOTAL PROTEIN: 6 G/DL (ref 6.4–8.3)
WBC # BLD: 9.2 E9/L (ref 4.5–11.5)

## 2021-04-22 PROCEDURE — 97110 THERAPEUTIC EXERCISES: CPT

## 2021-04-22 PROCEDURE — 1280000000 HC REHAB R&B

## 2021-04-22 PROCEDURE — 97129 THER IVNTJ 1ST 15 MIN: CPT

## 2021-04-22 PROCEDURE — 6370000000 HC RX 637 (ALT 250 FOR IP): Performed by: INTERNAL MEDICINE

## 2021-04-22 PROCEDURE — 6360000002 HC RX W HCPCS: Performed by: SPECIALIST

## 2021-04-22 PROCEDURE — 87040 BLOOD CULTURE FOR BACTERIA: CPT

## 2021-04-22 PROCEDURE — 36415 COLL VENOUS BLD VENIPUNCTURE: CPT

## 2021-04-22 PROCEDURE — 97530 THERAPEUTIC ACTIVITIES: CPT

## 2021-04-22 PROCEDURE — 6360000002 HC RX W HCPCS: Performed by: INTERNAL MEDICINE

## 2021-04-22 PROCEDURE — 51798 US URINE CAPACITY MEASURE: CPT

## 2021-04-22 PROCEDURE — 85025 COMPLETE CBC W/AUTO DIFF WBC: CPT

## 2021-04-22 PROCEDURE — 2580000003 HC RX 258: Performed by: SPECIALIST

## 2021-04-22 PROCEDURE — 80053 COMPREHEN METABOLIC PANEL: CPT

## 2021-04-22 RX ADMIN — METOPROLOL TARTRATE 50 MG: 50 TABLET, FILM COATED ORAL at 20:33

## 2021-04-22 RX ADMIN — CEFTRIAXONE SODIUM 2000 MG: 2 INJECTION, POWDER, FOR SOLUTION INTRAMUSCULAR; INTRAVENOUS at 20:33

## 2021-04-22 RX ADMIN — FLUTICASONE PROPIONATE 2 SPRAY: 50 SPRAY, METERED NASAL at 07:39

## 2021-04-22 RX ADMIN — ATORVASTATIN CALCIUM 80 MG: 80 TABLET, FILM COATED ORAL at 07:42

## 2021-04-22 RX ADMIN — ENOXAPARIN SODIUM 40 MG: 40 INJECTION SUBCUTANEOUS at 07:39

## 2021-04-22 RX ADMIN — BACLOFEN 10 MG: 10 TABLET ORAL at 13:52

## 2021-04-22 RX ADMIN — FERROUS SULFATE TAB 325 MG (65 MG ELEMENTAL FE) 325 MG: 325 (65 FE) TAB at 07:40

## 2021-04-22 RX ADMIN — TAMSULOSIN HYDROCHLORIDE 0.4 MG: 0.4 CAPSULE ORAL at 20:34

## 2021-04-22 RX ADMIN — ACETAMINOPHEN 650 MG: 325 TABLET ORAL at 08:46

## 2021-04-22 RX ADMIN — MEROPENEM 1000 MG: 1 INJECTION, POWDER, FOR SOLUTION INTRAVENOUS at 09:09

## 2021-04-22 RX ADMIN — MEROPENEM 1000 MG: 1 INJECTION, POWDER, FOR SOLUTION INTRAVENOUS at 01:57

## 2021-04-22 RX ADMIN — ACETAMINOPHEN 650 MG: 325 TABLET ORAL at 20:33

## 2021-04-22 RX ADMIN — ASPIRIN 81 MG CHEWABLE TABLET 81 MG: 81 TABLET CHEWABLE at 07:42

## 2021-04-22 RX ADMIN — CLOPIDOGREL 75 MG: 75 TABLET, FILM COATED ORAL at 07:42

## 2021-04-22 RX ADMIN — METOPROLOL TARTRATE 50 MG: 50 TABLET, FILM COATED ORAL at 07:39

## 2021-04-22 RX ADMIN — LOSARTAN POTASSIUM 50 MG: 50 TABLET, FILM COATED ORAL at 07:39

## 2021-04-22 RX ADMIN — BACLOFEN 10 MG: 10 TABLET ORAL at 07:53

## 2021-04-22 RX ADMIN — BACLOFEN 10 MG: 10 TABLET ORAL at 20:34

## 2021-04-22 ASSESSMENT — PAIN DESCRIPTION - PAIN TYPE: TYPE: ACUTE PAIN

## 2021-04-22 ASSESSMENT — PAIN SCALES - GENERAL
PAINLEVEL_OUTOF10: 4
PAINLEVEL_OUTOF10: 2
PAINLEVEL_OUTOF10: 4
PAINLEVEL_OUTOF10: 0

## 2021-04-22 ASSESSMENT — PAIN DESCRIPTION - ORIENTATION: ORIENTATION: RIGHT

## 2021-04-22 ASSESSMENT — PAIN DESCRIPTION - LOCATION: LOCATION: HIP

## 2021-04-22 ASSESSMENT — PAIN DESCRIPTION - DESCRIPTORS: DESCRIPTORS: ACHING;DISCOMFORT;SPASM

## 2021-04-22 NOTE — PROGRESS NOTES
Physical Therapy  Weakly Team Note   Evaluating Therapist: Eileen Mac DPT    NAME: Min Menezes  ROOM: [address]  DIAGNOSIS: Ischemic cerebral vascular accident -  R MCA  PRECAUTIONS: Falls, L hemiparesis, L inattention   PROCEDURE(S): 4/13 tPA, IR mechanical art intracranial thrombectomy    HPI: Presented on 4/13/21 with left sided weakness. He has history of prior stroke and is on asa and plavix as well as CAD, HLD, HTN, and MI. NIHSS 15. He was found to have right MCA M1 occlusion. Dr. Kyle Zeng completed successful thrombectomy with TICI 3 reperfusion and received TPA. Social:  Pt lives with wife in a 1 story floor plan with 3 steps and 2 rails to enter. Pt uses a ramp and transport chair to enter/exit home. Prior to admission pt reports not doing much walking around the house and primarily used a transport chair to get around. Pt states that he uses a \"Upwalker\" to get into/out of his truck. Pt also owns a transport wheelchair, power wheelchair, and canes. Initial Evaluation  4/16/21 4/22/21     Comments    Short Term Goals Long Term Goals    Bed Mobility  Rolling: SBA  Supine to sit: SBA  Sit to supine: SBA  Scooting: SBA Supine<>sit: Supervision  Rolling: Supervision  Supervision Independent   Transfers Sit to stand: Mod A  Stand to sit: Mod A  Stand pivot: Min A with [employer] and LOLI Brennan Sit to stand: Supervision  Stand to sit: Supervision  Stand pivot: SBA with [employer] Stand pivot performance diminishes when pt is fatigued; pt drags L foot when turning Min A Supervision   Ambulation   50 feet with UpWalker with Min A 50 feet x1 rep and 65 feet x1 rep with [employer] SBA/CGA Pt fatigues easily; LLE fatigues first 150 feet with AAD with SBA >150 feet with AAD with Supervision   Wheel Chair Mobility 25 feet Max A 50 feet Supervision Increased time to complete, slow moving; pt states that he is more mobile with transport chair at home; pt also stated he uses a power wheelchair?  50 feet  feet Mod I   Car Transfers NT - deferred today due to pt's difficulty with STS from high chair Min A Difficulty standing up from low car height' fatigued with side stepping once he got out of the car and required assistance to complete stand pivot to chair Min A Supervision   Stair negotiation: ascended and descended 1 step x2 reps with B rails with Min A - backwards descending 1 step x6 reps with B rails SBA/CGA - backwards descending NT 4 steps with B rail with Min A 4 steps with B rail with Supervision   BLE ROM WFL WFL      BLE Strength RLE 5/5  LLE 4-/5 RLE 5/5  LLE 4-/5      Balance  Sitting: Supervision  Standing: Min/Mod A Sitting: Supervision  Standing: SBA/CGA with [employer]      Date Family Teach Completed TBA TBA      Is additional Family Teaching Needed? Y or N Y Y      Hindering Progress Weakness Weakness      PT recommended ELOS 2-3 weeks 2 weeks      Team's Discharge Plan  Discharge Friday 4/30/21      Therapist at Team Meeting  Marya Kidd PT, DPT [id number]          Date:  4/22/21  Supporting factors:  Supportive wife  Barriers to discharge:  [facility]  Additional comments:  Pt has made some progress thus far but still has significant impairments especially with endurance. Pt has fair- carry over with cues and tends to do things his preferred way. Using the [employer] as the primary AD not the pt's UpWalker, and the pt was educated that he should use a [employer] as it is more functional. Recommend initiating family training ASAP so wife can weigh in on pt's PLOF since pt reports he did not do much walking in the first place.   DME:  TBD closer to discharge - unsure if pt has a regular [employer]  After Care:  [address] [place] [address], DPT [id number]

## 2021-04-22 NOTE — PROGRESS NOTES
Comprehensive Nutrition Assessment    Type and Reason for Visit:  Initial, RD Nutrition Re-Screen/LOS(RD Re-Screen Negative)    Nutrition Assessment:  Pt assessed per LOS protocol. Chart reviewed. Pt currently eating ~75% of most meals and w/ no other significant nutritional issues noted at this time. Will follow-up per policy. Please consult if RD needed.     Electronically signed by Shilo Mon RD, JULIETA on 4/22/21 at 10:03 AM EDT    Contact: ext [phone]

## 2021-04-22 NOTE — PROGRESS NOTES
work of breathing, good air exchange, clear to auscultation bilaterally, no crackles or wheezing  CARDIOVASCULAR:  Normal apical impulse, regular rate and rhythm, normal S1 and S2, no S3 or S4, and no murmur noted  Data    CBC with Differential:    Lab Results   Component Value Date    WBC 10.0 04/20/2021    RBC 3.38 04/20/2021    HGB 9.7 04/20/2021    HCT 30.7 04/20/2021     04/20/2021    MCV 90.8 04/20/2021    MCH 28.7 04/20/2021    MCHC 31.6 04/20/2021    RDW 16.3 04/20/2021    LYMPHOPCT 16.4 04/20/2021    MONOPCT 7.2 04/20/2021    BASOPCT 0.4 04/20/2021    MONOSABS 0.72 04/20/2021    LYMPHSABS 1.64 04/20/2021    EOSABS 0.15 04/20/2021    BASOSABS 0.04 04/20/2021     CMP:    Lab Results   Component Value Date     04/20/2021    K 3.5 04/20/2021    K 3.7 04/16/2021     04/20/2021    CO2 27 04/20/2021    BUN 11 04/20/2021    CREATININE 0.9 04/20/2021    GFRAA >60 04/20/2021    LABGLOM >60 04/20/2021    GLUCOSE 86 04/20/2021    GLUCOSE 90 03/28/2012    PROT 5.4 04/20/2021    LABALBU 3.1 04/20/2021    CALCIUM 8.1 04/20/2021    BILITOT 1.0 04/20/2021    ALKPHOS 52 04/20/2021    AST 27 04/20/2021    ALT 20 04/20/2021     PT/INR:    Lab Results   Component Value Date    PROTIME 12.7 04/16/2021    INR 1.2 04/16/2021       ASSESSMENT AND PLAN        Ischemic stroke [facility])  Plan: Recovering well  Urinary retention; urinary tract infection most likely prostatitis being treated with antibiotics  Diarrhea from antibiotics      Electronically signed by Carol Chun MD on 4/22/2021 at 8:06 AM

## 2021-04-22 NOTE — CONSULTS
4/22/2021 5:36 PM  Service: Urology  Group: [facility] ([place])    Andrea Palacios  [id number]     Chief Complaint:    Urinary retention    History of Present Illness: The patient is a 66 y.o. male patient who is currently in acute rehab after suffering from an acute stroke. On 4/13/2021 he received TPA and underwent emergent thrombectomy. He states that he does have history of previous strokes as well. He reports upon admission to rehab his Warren catheter was removed. He has been unable to void and states that he has been straight catheterized multiple times and is now refusing to have a Warren catheter placed. His bladder scan residuals have been over 500 mL and most recently 900 mL. Infectious diseases following for UTI with bacteremia. We were asked to evaluate for Warren catheter placement. He has never seen a urologist in the past.  He denies any trouble urinating prior to admission. Denies nocturia, frequency, urgency, or incontinence. Currently denies any discomfort at all and was just bladder scanned for residual 900 mL. He is reluctant to have Warren catheter placed secondary to the painful intermittent straight catheterizations that he has undergone.     Past Medical History:   Diagnosis Date    CAD (coronary artery disease)     Cerebral artery occlusion with cerebral infarction [facility]     Hyperlipidemia     Hypertension     MI, old          Past Surgical History:   Procedure Laterality Date    CORONARY ANGIOPLASTY WITH STENT PLACEMENT      INSERTABLE CARDIAC MONITOR      reveal linq cardiac monitor    IR MECHANICAL ART THROMBECTOMY INTRACRANIAL  4/13/2021    IR MECHANICAL ART THROMBECTOMY INTRACRANIAL 4/13/2021 MD ANTON Wise SPECIAL PROCEDURES    OTHER SURGICAL HISTORY  07/12/2016    Dr. Dano Caal- [place] insertion       Medications Prior to Admission:    Medications Prior to Admission: baclofen (LIORESAL) 10 MG tablet, Take 1 tablet by mouth 3 times daily  metoprolol tartrate (LOPRESSOR) 50 MG tablet, Take 50 mg by mouth 2 times daily  captopril (CAPOTEN) 50 MG tablet, Take 50 mg by mouth 3 times daily  aspirin 81 MG tablet, Take 81 mg by mouth every other day  atorvastatin (LIPITOR) 80 MG tablet, Take 80 mg by mouth daily. clopidogrel (PLAVIX) 75 MG tablet, Take 75 mg by mouth daily. Allergies:    Ciprofloxacin-ciproflox hcl er and Pcn [penicillins]    Social History:    reports that he has been smoking cigars and cigarettes. He has been smoking about 0.25 packs per day. He has never used smokeless tobacco. He reports current alcohol use of about 1.0 standard drinks of alcohol per week. He reports that he does not use drugs. Family History:   Non-contributory to this Urological problem  family history is not on file. Review of Systems:  Constitutional: No fever or chills   Respiratory: negative for cough and hemoptysis  Cardiovascular: negative for chest pain and dyspnea  Gastrointestinal: negative for abdominal pain, diarrhea, nausea and vomiting   Derm: negative for rash and skin lesion(s)  Neurological: negative for seizures and tremors  Musculoskeletal: Negative    Psychiatric: Negative   : As above in the HPI, otherwise negative  All other reviews are negative    Physical Exam:     Vitals:  /73   Pulse 70   Temp 97.5 °F (36.4 °C) (Oral)   Resp 18   Ht 6' 1\" (1.854 m)   Wt 270 lb 6.4 oz (122.7 kg)   SpO2 94%   BMI 35.67 kg/m²     General:  Awake, alert, oriented X 3. No apparent distress. HEENT:  Normocephalic, atraumatic. Lungs:  Respirations symmetric and non-labored.   Abdomen:  soft, nontender, no masses  Extremities:  No clubbing, cyanosis, or edema  Skin:  Warm and dry, no open lesions or rashes  Neuro: Left-sided weakness  Rectal: deferred  Genitourinary: No Warren    Labs:     Recent Labs     04/20/21  0429 04/22/21  1530   WBC 10.0 9.2   RBC 3.38* 3.53*   HGB 9.7* 10.1*   HCT 30.7* 32.4*   MCV 90.8 91.8   MCH 28.7 28.6 MCHC 31.6* 31.2*   RDW 16.3* 17.2*    337   MPV 9.9 9.2         Recent Labs     04/20/21  0429 04/22/21  1530   CREATININE 0.9 1.1       Procedures:  His genitalia are prepped and draped in sterile fashion. Xylocaine jelly was injected into his urethra. A 16 Divehi Warren catheter was then inserted into his bladder after a meeting moderate amount of resistance at the prostate. The balloon was inflated with 10 cc of water. He had approximately 100 cc of immediate yellow urine output. He tolerated well. Assessment/plan:  Urinary retention (failed inpatient TOV and underwent ISC, PVRs >600)  UTI with Bacteremia   Possible Hypotonic bladder    Urine Cx Aerococcus   Blood Cx Aerococcus  ID following, UTI with bacteremia associated with Aerococcus and urine manipulation  Cont antibiotics per ID   Creatinine stable  Continue to watch the WBCs  He does not report any discomfort with high residuals. Possible hypotonic bladder, prior history of strokes as well  Cont the Flomax, he should be discharged on this  Continue the Warren catheter  He will undergo a voiding trial as an outpatient in our office  We will need SERENA and PSA in the future as well  If continues to fail voiding trials will need office cystoscopy to determine if he would benefit from a TURP vs Urolift in the future   No further  interventions are planned at this time  Please call with any further questions or concerns  Thank you for allowing us to participate in his care       Electronically signed by RADHA Loza CNP on 4/22/2021 at 5:36 PM         The patient was seen and examined. I have reviewed the medical record in detail. I agree with the plan as outlined by JONATHAN Loza.   He may be a candidate for a Uro-Lift procedure at some point    Eugenio Patterson MD  8:22 PM  4/22/2021

## 2021-04-22 NOTE — PLAN OF CARE
Problem: Falls - Risk of:  Goal: Will remain free from falls  Description: Will remain free from falls  4/22/2021 0122 by Gómez Bateman RN  Outcome: Met This Shift  Goal: Absence of physical injury  Description: Absence of physical injury  4/22/2021 0122 by Gómez Bateman RN  Outcome: Met This Shift     Problem: Pain:  Goal: Pain level will decrease  Description: Pain level will decrease  4/22/2021 0122 by Gómez Bateman RN  Outcome: Met This Shift  Goal: Control of acute pain  Description: Control of acute pain  4/22/2021 0122 by Gómez Bateman RN  Outcome: Met This Shift  Goal: Control of chronic pain  Description: Control of chronic pain  4/22/2021 0122 by Gómez Bateman RN  Outcome: Met This Shift     Problem: Skin Integrity:  Goal: Will show no infection signs and symptoms  Description: Will show no infection signs and symptoms  4/22/2021 0122 by Gómez Bateman RN  Outcome: Met This Shift  Goal: Absence of new skin breakdown  Description: Absence of new skin breakdown  4/22/2021 0122 by Gómez Bateman RN  Outcome: Met This Shift

## 2021-04-22 NOTE — PROGRESS NOTES
[address] [place] [address] [facility]  Progress Note    Chief complaint: Fever and urinary retention    SUBJECTIVE:  Patient is tolerating medications. No reported adverse drug reactions. No nausea, vomiting, diarrhea. Up in chair. Loose stool times 3 yesterday. He is on senokot and courtney lax. Pain to left thigh. Review of systems:  As stated above in the chief complaint, otherwise negative. Medications:  Scheduled Meds:   losartan  50 mg Oral Daily    tamsulosin  0.4 mg Oral Nightly    meropenem  1,000 mg Intravenous Q8H    ferrous sulfate  325 mg Oral Daily with breakfast    fluticasone  2 spray Each Nostril Daily    aspirin  81 mg Oral Daily    atorvastatin  80 mg Oral Daily    baclofen  10 mg Oral TID    clopidogrel  75 mg Oral Daily    enoxaparin  40 mg Subcutaneous Daily    metoprolol tartrate  50 mg Oral BID    polyethylene glycol  17 g Oral Daily    senna  1 tablet Oral Nightly     Continuous Infusions:  PRN Meds:bismuth subsalicylate, bisacodyl, acetaminophen, magnesium hydroxide    OBJECTIVE:  /78   Pulse 78   Temp 98.2 °F (36.8 °C) (Oral)   Resp 18   Ht 6' 1\" (1.854 m)   Wt 270 lb 6.4 oz (122.7 kg)   SpO2 96%   BMI 35.67 kg/m²   Temp  Av.1 °F (36.7 °C)  Min: 98 °F (36.7 °C)  Max: 98.2 °F (36.8 °C)  Constitutional: The patient is awake, alert, and oriented. Skin: Warm and dry. No rashes were noted. HEENT: Round and reactive pupils. Moist mucous membranes. No ulcerations or thrush. Neck: Supple to movements. Chest: No use of accessory muscles to breathe. Symmetrical expansion. No wheezing, crackles or rhonchi. Cardiovascular: S1 and S2 are rhythmic and regular. No murmurs appreciated. Abdomen: Positive bowel sounds to auscultation. Benign to palpation. No masses felt. No hepatosplenomegaly. Genitourinary: Male reviewed  Extremities: No clubbing, no cyanosis, no edema.   Lines: peripheral    Laboratory and Tests Review:  Lab Results   Component

## 2021-04-22 NOTE — PROGRESS NOTES
Speech Language Pathology  ACUTE REHABILITATION--DAILY PROGRESS NOTE            SUMMARY OF EVALUATION    ADMITTING DIAGNOSIS: Ischemic cerebral vascular accident (CVA) due to stenosis of large extracranial artery (HCC) [I63.20]  [facility] [I63.9]                 DYSPHAGIA DIAGNOSIS:  Mild oral stage dysphagia attributed to mild left labiobuccal and lingual weakness and resolving xerostomia. Pharyngeal stage appears functional.                            DIET RECOMMENDATIONS:   Recommend advance to regular consistency solids. Continue thin liquids.                 FEEDING RECOMMENDATIONS:                           Assistance level:  No assistance needed.                              Compensatory strategies recommended: Small bites/sips     THERAPY RECOMMENDATIONS:                       SPEECH PATHOLOGY DIAGNOSIS:    Moderate STM deficit. Mild overall cognitive deficit. Mild dysarthria with left labiobuccal and lingual weakness.      THERAPY RECOMMENDATIONS:   Speech Pathology intervention is recommended 3-6 times per week for LOS or when goals are met with emphasis on the following:      Improve STM recall, sequencing and problem solving for increased functional independence with completion of ADLs/IADLs to a level commensurate with supervision.     Increase speech intelligibility by               -- improving strength/ROM of the facial and lingual musculature to facilitate and sustain accuracy of articulator placement.               -- articulation drill training to promote precision of phoneme production at the word and sentence levels (goal met given 90% accuracy of production with unknown context).                                                                                                                                            FIMS SCORES                            Swallowing                          Current Status               6--Modified Independent               Short Term Goal 6--Modified Independent                       Long Term Goal          6--Modified Independent              Receptive                          Current Status             5--Supervision               Short Term Goal         6--Modified Independent                       Long Term Goal          6--Modified Independent              Expressive                          Current Status             5--Supervision               Short Term Goal         6--Modified Independent                       Long Term Goal          6--Modified Independent              Social Interaction                          Current Status             4--Minimal Assistance               Short Term Goal         5--Supervision               Long Term Goal          5--Supervision                                                     Problem Solving                          Current Status             4--Minimal Assistance               Short Term Goal         5--Supervision               Long Term Goal          5--Supervision                  Memory                          Current Status             3--Moderate Assistance                        Short Term Goal         4--Minimal Assistance               Long Term Goal          5--Supervision                             SWALLOWING:      Diet:   Recommend advance to regular consistency solids. Continue thin liquids      Supervision is no longer required during mealtimes. LANGUAGE:     Benefits from increased time and occasional cues. Per patient, he has decreased auditory acuity and discrimination; reports issue as longstanding. COGNITION:        Engaged patient in tabletop based cognitive task that required him to utilize a calendar to complete a handout containing specific functionally based scenarios. Fair- accuracy of response given increased time. Patient benefited from consistent mod v/c. Task discontinued early due to patient fatigue and frustration.  Good recall of temporal unit markers at end of task (e.g \"century = ? years, decade= ? years\", etc). Safety:  Fair-; SLP educated patient multiple times regarding need to request assistance (and not transfer self or take self to bathroom). SPEECH:     Mild dysarthria with left labiobuccal and lingual weakness. Patient feels his speech is at baseline. SP recommended after discharge: Yes pending discussion with patient's wife (patient feels he is at baseline from previous CVAs ~3 and ~5 years ago). Supervision recommended at discharge: 24 hour    Will continue SP intervention as per previously established POC.     Minute Tracking:    Individual therapy:     0 minutes  Concurrent therapy:    0 minutes  Group therapy:     0 minutes  Co-treatment therapy:   15 minutes    Total minutes for 4/22/2021: 15 minutes  Variance -- scheduling issues, patient fatigue

## 2021-04-22 NOTE — PROGRESS NOTES
Pt. Voided 100cc in urinal. Bladder scanned for 644cc. Refuses ICP states that it hurts.  Also stated he feels no bladder discomfort

## 2021-04-22 NOTE — PROGRESS NOTES
Occupational Therapy  OCCUPATIONAL THERAPY DAILY NOTE    Date:2021  Patient Name: Faisal Pickard  MRN: [id number]  : 1950  Room: [address] [place], [place] [address]     Diagnosis: CVA- RMCA  Precautions: Fall Risk & min [place]    Functional Assessment:   Date Status AE  Comments   Feeding 21 s/u     Grooming 21 S/u            Oral Care 21 S/u     Bathing 21 Ub: supervision   LB: MIN A Shower bench     UB Dressing 21 S/u     LB Dressing 21 Mod A reacher    Footwear 21 Mod/max A    SBA Reacher sock aid    Toileting 21 Min A     Homemaking         Functional Transfers / Balance:   Date Status DME  Comments   Sit Balance 21 SBA     Stand Balance 21 CGA     [] Tub  [x] Shower   Transfer 21 Min A Shower bench, w/w    Commode   Transfer 21 CGA Standard toilet, w/w    Functional   Mobility 21 CGA W/w     Other:  EOB>WC   21   Min A    SPT with VCs for safety and hand placement     Functional Exercises / Activity:  Copy pattern pegboard activity with focus on L FMC, problem solving and sequencing. Pt complete with increased time to complete. Mod resistive power  X 50 reps to each hand to increase strength for independence with ADLs     Arm Bike X 3 Mins forward and X 2 Mins backward standing with X 2 Min seated rest break to increase BUE strength, endurance, balance and stand tolerance. Pt completes with fair- endurance and stand tolerance noted. SBA-CGA for stand balance demo'd. Min resistive theraband 3 X 10 horizontal abduction/adduction and elbow flexion to increase BUE strength and endurance for independence with functional tasks and transfers. Sensory / Neuromuscular Re-Education:      Cognitive Skills:   Status Comments   Problem   Solving fair     Memory fair     Sequencing fair     Safety fair       Visual Perception:    Education:  Pt educated on hand placement and safety during functional transfers. .   [] Family teach completed on:    Pain Level: no c/o pain this date. Additional Notes:       Patient has made good  progress during treatment sessions toward set goals. Therapy emphasis to obtain goals:Strengthening, Gait Training, Patient/Caregiver Education & Training, Home Management Training, ROM, Equipment Evaluation, Education, & procurement, Balance Training, Neuromuscular Re-education, Functional Mobility Training, Cognitive Reorientation, Positioning, Endurance Training, Safety Education & Training, Self-Care / ADL    [x] Continue with current OT Plan of care.   [] Prepare for Discharge     DISCHARGE RECOMMENDATIONS  Recommended DME:    Post Discharge Care:   []Home Independently  []Home with 24hr Care / Supervision []Home with Partial Supervision []Home with Home Health OT []Home with Out Pt OT []Other: ___   Comments:         Time in Time out Tx Time Breakdown  Variance:   First Session  10:00 10:45 [] Individual Tx-  [x] Concurrent Tx -  45 Mins  [] Co-Tx -   [] Group Tx -   [] Time Missed -     Second Session 1:45 2:30 [x] Individual Tx- 45 Mins  [] Concurrent Tx -  [] Co-Tx -   [] Group Tx -   [] Time Missed -     Third Session    [] Individual Tx-   [] Concurrent Tx -  [] Co-Tx -   [] Group Tx -   [] Time Missed -         Total Tx Time  90 Mins      Damaris QUINONES/JESIKA [id number]

## 2021-04-22 NOTE — PROGRESS NOTES
Physical Therapy  Daily Treatment Note  Evaluating Therapist: Aishwarya Mathew DPT    NAME: Melba Bustamante  ROOM: [id number]  DIAGNOSIS: Ischemic cerebral vascular accident -  R MCA  PRECAUTIONS: Falls, L hemiparesis, L inattention   PROCEDURE(S): 4/13 tPA, IR mechanical art intracranial thrombectomy    HPI: Presented on 4/13/21 with left sided weakness. He has history of prior stroke and is on asa and plavix as well as CAD, HLD, HTN, and MI. NIHSS 15. He was found to have right MCA M1 occlusion. Dr. Shey Cronin completed successful thrombectomy with TICI 3 reperfusion and received TPA. Social:  Pt lives with wife in a 1 story floor plan with 3 steps and 2 rails to enter. Pt uses a ramp and transport chair to enter/exit home. Prior to admission pt reports not doing much walking around the house and primarily used a transport chair to get around. Pt states that he uses a \"Upwalker\" to get into/out of his truck. Pt also owns a transport wheelchair, power wheelchair, and canes. Initial Evaluation  4/16/21 AM     PM    Short Term Goals Long Term Goals    Was pt agreeable to Eval/treatment? yes yes yes     Does pt have pain? L hip pain - hematoma from a fall at home L knee ache; L thigh/calf pain L knee ache; L thigh/calf pain     Bed Mobility  Rolling: SBA  Supine to sit: SBA  Sit to supine: SBA  Scooting: SBA Supine<>sit: Supervision  Rolling: Supervision Supine to sit: Supervision Supervision Independent   Transfers Sit to stand:  Mod A  Stand to sit: Mod A  Stand pivot: Min A with [employer] and LOLI Brennan Sit to stand: Supervision  Stand to sit: Supervision  Stand pivot: SBA with [employer] Sit to stand: Supervision  Stand to sit: Supervision  Stand pivot: SBA with [employer] Min A Supervision   Ambulation   50 feet with UpWalker with Min A 50 feet x1 rep and 65 feet x1 rep with [employer] SBA/CGA 75 feet with [employer] SBA/CGA +  WC follow for safety 150 feet with AAD with SBA >150 feet with AAD with Supervision   Walking 10 feet on uneven surface 10 functioning even at home as this device is more functional and less bulky. Pt had difficulty standing up from the low height of the car during the car transfer. PT sessions will continue to focus on improving the pt's strength and function with all mobility activities. Bed/chair alarm: armed after each session    Patient/family education  Pt/family educated on safety with functional mobility, leaning forward during sit<>stand transfers    Patient/family response to education:   Pt/family verbalized understanding Pt/family demonstrated skill Pt/family requires further education in this area   yes With cues yes     AM   Time in: 1045  Time out: 1130  PM  Time in: 1300  Time out: 1345    Pt is making fair+ progress toward established Physical Therapy goals. Continue with physical therapy current plan of care.     Rip Peck DPT [id number]

## 2021-04-23 LAB — CULTURE, BLOOD 2: NORMAL

## 2021-04-23 PROCEDURE — 97130 THER IVNTJ EA ADDL 15 MIN: CPT

## 2021-04-23 PROCEDURE — 97129 THER IVNTJ 1ST 15 MIN: CPT

## 2021-04-23 PROCEDURE — 6370000000 HC RX 637 (ALT 250 FOR IP): Performed by: INTERNAL MEDICINE

## 2021-04-23 PROCEDURE — 97535 SELF CARE MNGMENT TRAINING: CPT

## 2021-04-23 PROCEDURE — 97530 THERAPEUTIC ACTIVITIES: CPT

## 2021-04-23 PROCEDURE — 97110 THERAPEUTIC EXERCISES: CPT

## 2021-04-23 PROCEDURE — 6360000002 HC RX W HCPCS: Performed by: INTERNAL MEDICINE

## 2021-04-23 PROCEDURE — 2580000003 HC RX 258: Performed by: SPECIALIST

## 2021-04-23 PROCEDURE — 6360000002 HC RX W HCPCS: Performed by: SPECIALIST

## 2021-04-23 PROCEDURE — 1280000000 HC REHAB R&B

## 2021-04-23 RX ORDER — SODIUM CHLORIDE 0.9 % (FLUSH) 0.9 %
SYRINGE (ML) INJECTION
Status: DISPENSED
Start: 2021-04-23 | End: 2021-04-24

## 2021-04-23 RX ORDER — SODIUM CHLORIDE 0.9 % (FLUSH) 0.9 %
5-40 SYRINGE (ML) INJECTION PRN
Status: DISCONTINUED | OUTPATIENT
Start: 2021-04-23 | End: 2021-04-30 | Stop reason: HOSPADM

## 2021-04-23 RX ADMIN — FLUTICASONE PROPIONATE 2 SPRAY: 50 SPRAY, METERED NASAL at 09:06

## 2021-04-23 RX ADMIN — BACLOFEN 10 MG: 10 TABLET ORAL at 20:08

## 2021-04-23 RX ADMIN — LOSARTAN POTASSIUM 50 MG: 50 TABLET, FILM COATED ORAL at 09:03

## 2021-04-23 RX ADMIN — ATORVASTATIN CALCIUM 80 MG: 80 TABLET, FILM COATED ORAL at 09:03

## 2021-04-23 RX ADMIN — ACETAMINOPHEN 650 MG: 325 TABLET ORAL at 12:51

## 2021-04-23 RX ADMIN — BACLOFEN 10 MG: 10 TABLET ORAL at 09:04

## 2021-04-23 RX ADMIN — ACETAMINOPHEN 650 MG: 325 TABLET ORAL at 20:09

## 2021-04-23 RX ADMIN — CEFTRIAXONE SODIUM 2000 MG: 2 INJECTION, POWDER, FOR SOLUTION INTRAMUSCULAR; INTRAVENOUS at 15:15

## 2021-04-23 RX ADMIN — CLOPIDOGREL 75 MG: 75 TABLET, FILM COATED ORAL at 09:04

## 2021-04-23 RX ADMIN — ASPIRIN 81 MG CHEWABLE TABLET 81 MG: 81 TABLET CHEWABLE at 09:05

## 2021-04-23 RX ADMIN — ENOXAPARIN SODIUM 40 MG: 40 INJECTION SUBCUTANEOUS at 09:06

## 2021-04-23 RX ADMIN — METOPROLOL TARTRATE 50 MG: 50 TABLET, FILM COATED ORAL at 09:05

## 2021-04-23 RX ADMIN — BACLOFEN 10 MG: 10 TABLET ORAL at 15:15

## 2021-04-23 RX ADMIN — TAMSULOSIN HYDROCHLORIDE 0.4 MG: 0.4 CAPSULE ORAL at 20:08

## 2021-04-23 RX ADMIN — FERROUS SULFATE TAB 325 MG (65 MG ELEMENTAL FE) 325 MG: 325 (65 FE) TAB at 09:05

## 2021-04-23 RX ADMIN — METOPROLOL TARTRATE 50 MG: 50 TABLET, FILM COATED ORAL at 20:08

## 2021-04-23 ASSESSMENT — PAIN SCALES - GENERAL
PAINLEVEL_OUTOF10: 5
PAINLEVEL_OUTOF10: 4

## 2021-04-23 ASSESSMENT — PAIN DESCRIPTION - PAIN TYPE: TYPE: ACUTE PAIN

## 2021-04-23 ASSESSMENT — PAIN DESCRIPTION - LOCATION: LOCATION: HIP

## 2021-04-23 NOTE — PROGRESS NOTES
Department of Internal Daniel Bynum M.D.  Progress Note      SUBJECTIVE: He reports he is eating well; he seems ready to resume his therapy    OBJECTIVE obese abdomen    Medications    Current Facility-Administered Medications: cefTRIAXone (ROCEPHIN) 2,000 mg in sterile water 20 mL IV syringe, 2,000 mg, Intravenous, Y19A  bismuth subsalicylate (PEPTO BISMOL) 262 MG/15ML suspension 30 mL, 30 mL, Oral, Q6H PRN  losartan (COZAAR) tablet 50 mg, 50 mg, Oral, Daily  tamsulosin (FLOMAX) capsule 0.4 mg, 0.4 mg, Oral, Nightly  ferrous sulfate (IRON 325) tablet 325 mg, 325 mg, Oral, Daily with breakfast  bisacodyl (DULCOLAX) suppository 10 mg, 10 mg, Rectal, Daily PRN  fluticasone (FLONASE) 50 MCG/ACT nasal spray 2 spray, 2 spray, Each Nostril, Daily  acetaminophen (TYLENOL) tablet 650 mg, 650 mg, Oral, Q4H PRN  aspirin chewable tablet 81 mg, 81 mg, Oral, Daily  atorvastatin (LIPITOR) tablet 80 mg, 80 mg, Oral, Daily  baclofen (LIORESAL) tablet 10 mg, 10 mg, Oral, TID  clopidogrel (PLAVIX) tablet 75 mg, 75 mg, Oral, Daily  enoxaparin (LOVENOX) injection 40 mg, 40 mg, Subcutaneous, Daily  metoprolol tartrate (LOPRESSOR) tablet 50 mg, 50 mg, Oral, BID  magnesium hydroxide (MILK OF MAGNESIA) 400 MG/5ML suspension 30 mL, 30 mL, Oral, Daily PRN  Physical    VITALS:  BP (!) 160/76   Pulse 78   Temp 97.9 °F (36.6 °C) (Oral)   Resp 18   Ht 6' 1\" (1.854 m)   Wt 270 lb 6.4 oz (122.7 kg)   SpO2 94%   BMI 35.67 kg/m²   24HR INTAKE/OUTPUT:      Intake/Output Summary (Last 24 hours) at 4/23/2021 0750  Last data filed at 4/22/2021 4517  Gross per 24 hour   Intake 1260 ml   Output 1150 ml   Net 110 ml     LUNGS:  No increased work of breathing, good air exchange, clear to auscultation bilaterally, no crackles or wheezing  CARDIOVASCULAR:  Normal apical impulse, regular rate and rhythm, normal S1 and S2, no S3 or S4, and no murmur noted  Data    CBC with Differential:    Lab Results   Component Value Date    WBC 9.2 04/22/2021    RBC 3.53 04/22/2021    HGB 10.1 04/22/2021    HCT 32.4 04/22/2021     04/22/2021    MCV 91.8 04/22/2021    MCH 28.6 04/22/2021    MCHC 31.2 04/22/2021    RDW 17.2 04/22/2021    LYMPHOPCT 25.0 04/22/2021    MONOPCT 9.3 04/22/2021    BASOPCT 0.5 04/22/2021    MONOSABS 0.86 04/22/2021    LYMPHSABS 2.31 04/22/2021    EOSABS 0.20 04/22/2021    BASOSABS 0.05 04/22/2021     CMP:    Lab Results   Component Value Date     04/22/2021    K 4.1 04/22/2021    K 3.7 04/16/2021     04/22/2021    CO2 26 04/22/2021    BUN 13 04/22/2021    CREATININE 1.1 04/22/2021    GFRAA >60 04/22/2021    LABGLOM >60 04/22/2021    GLUCOSE 94 04/22/2021    GLUCOSE 90 03/28/2012    PROT 6.0 04/22/2021    LABALBU 3.6 04/22/2021    CALCIUM 8.9 04/22/2021    BILITOT 0.8 04/22/2021    ALKPHOS 62 04/22/2021    AST 27 04/22/2021    ALT 25 04/22/2021     PT/INR:    Lab Results   Component Value Date    PROTIME 12.7 04/16/2021    INR 1.2 04/16/2021       ASSESSMENT AND PLAN      Active Problems:  Urinary retention most likely neurogenic bladder, indwelling catheter in place with very good drainage.     Ischemic cerebral vascular accident (CVA) due to stenosis of large extracranial artery ; recovering well  Diarrhea from antibiotic this seems to be resolving  Urinary tract infection most likely prostatitis      Electronically signed by Amol Burroughs MD on 4/23/2021 at 7:50 AM

## 2021-04-23 NOTE — PATIENT CARE CONFERENCE
[address] [place] [address]  ACUTE REHABILITATION  TEAM CONFERENCE NOTE/PATIENT PLAN OF CARE    Date: 2021  Admission date: 4/15/2021  Patient Name: Elyse Moore        MRN: [id number]    : 1950  (72 y.o.)  Gender: male   Rehab diagnosis/surgery with date:  CVA-right MCA, thrombectomy done 21  Impairment Group Code:  1.1      MEDICAL/FUNCTIONAL HISTORY/STATUS:  new onset of pneumonia,per chest x-ray of 21, now has a UTI and urinary retention, barriga back in    Consultations/Labs/X-rays: ID for antibiotics, urology consult pending      MEDICATION UPDATE:  antibiotics have been switched several times, currently on IV Rocephin 2000 mg  every 24 hour      NURSING :    Bowel:   Always Continent  [x]   Occasionally incontinent  []   Frequently incontinent  []   Always incontinent  []   Not occurred  []     Bladder:   Always Continent  []    Incontinent less than daily[]   Incontinent  daily []   Always incontinent  []   No urine output    []   Indwelling catheter [x]       Toilet Hygiene:   Current level : min assist  Short term Toilet hygiene goal: supervision  Long term toilet hygiene goal:  supervision    Skin integrity: left  thigh bruise  Pain: left thigh, tylenol ordered    NUTRITION    Diet  carb controlled, low Na, regular diet  Liquid consistency   thin    SOCIAL INFORMATION:  Lives with: spouse  Prior community services:  none  Home Architecture:  ranch with 3 entry steps, 2 rails, side entrance has a ramp  Prior Level of function:  independent with cane or wheeled walker  DME:  wheelchair, quad cane , wheeled walker, scooter, shower chair, hospital bed, 3 in 1 commode    FAMILY / PATIENT EDUCATION:  safety and self care are ongoing with patient    PHYSICAL THERAPY    Bed mobility:   Current level: supervision  Short term bed mobility goal: supervision  Long term bed mobility goal: independent    Chair/bed transfers:  Current level: supervision-standby assist for stand pivot with a wheeled walker  Short term Chair/bed transfers goal: min assist  Long term Chair/bed transfers goal: supervision      Ambulation:   Current level: 50 ft with a wheeled walker at standby assist-Contact guard assist  Short term ambulation goal: 150 ft at standby assist  Long term ambulation goal: 150 ft with device a supervision    Wheelchair Mobility:  Current level: 50 ft at supervision  Short term wheelchair goal: 50 ft. at standby assist  Long term wheelchair goal: 150 ft. at supervision      Car transfers:   Current level: min assist  Short term car transfers goal: min assist  Long term car transfers goal:supervision    Stairs:   Current level : 1 times 6 reps with bilateral rails at Contact guard assist-standby assist  Short term stairs goal: 4 with 2 rails at min assist  Long term stairs goal: 4 with 2 rails at supervision    Lower Extremity Strength Issues:    RLE 5/5  LLE 4-/5  Other comments: sit balance is supervision, standing balance is standby assist-Contact guard assist with a wheeled walker      OCCUPATIONAL THERAPY:      Tub/shower:   Current level: min assist to walk in shower  Short term tub/shower goal: Contact guard assist  Long term tub/shower goal: supervision      Feeding:  Current level: supervision  Short term feeding goal: Modified independent  Long term feeding goal: independent    Grooming:   Current level: supervision  Short term grooming goal: Modified independent  Long term grooming goal: independent    Bathing:  Current level: min assist  Short term bathing goal: Contact guard assist  Long term bathing goal: standby assist    Homemaking:   Current level: to be assessed    Upper body dressing:  Current level: supervision  Short term upper body dressing goal: Modified independent  Long term upper body dressing goal: independent    Lower body dressing:                                                                          Current level: min assist             Short term lower body dressing goal: min assist          Long term lower body dressing goal: min assist            Footwear  Current level: mod assist  Short term goal: min assist  Long term goal:min assist      Toilet transfer:   Current level: min assist-Contact guard assist  Short term toilet transfer goal: Contact guard assist  Long term toilet transfer goal: Modified independent      SPEECH THERAPY  Swallowing:  Current level:  Modified independent  Short term swallowing goal: Modified independent  Long term swallowing Goal: Modified independent    Comprehension:   Current level: supervision  Short term comprehension goal: Modified independent  Long term comprehension goal: Modified independent    Expression:   Current level: supervision  Short term expression goal: Modified independent  Long term expression goal: Modified independent    Problem solving:   Current level: min assist  Short term problem solving goal: supervision  Long term problem solving goal: supervision    Memory:  Current level: mod assist  Short term memory goal: min assist  Long term memory goal: supervision    Social interaction:  min assist    Safety awareness: fair      Patient/family's personal goals: \"get out of bed on my own\"  Factors supporting goal achievement:  making progress  Factors hindering goal achievement:  poor endurance, left side weakness      Discharge Plan   Estimated Length of Stay: 4/30/21            Destination: home  Services at Discharge: home health services for nursing, PT, OT and speech  Equipment at Discharge: will check on walker      INTERDISCIPLINARY TEAM/PHYSICIAN [address]:  update spouse, continue working towards goals      I approve the established interdisciplinary plan of care as documented within the medical record of Freddiestefsun Espinal.       Electronically signed by Carry Hammans, RN  on 4/23/2021 at 8:06 AM  The following interdisciplinary team members were present:  Grace Nance RN  Marques Olivo, [facility],LSW  [place], DPT  Gunjan Patel, OTR  Cyndi Guillermo, SLP

## 2021-04-23 NOTE — CARE COORDINATION
Per team meeting:  D/c 4/30. Updated patient and wife. Raviewed goals Sup to Mod I. Patient has L LE weakness, has fair carry over. Requires 24 hour sup. Wife is aware. Wife asked how we picked d/c date - educated her on process. She stated she didn't think he was getting any therapy. IT has been reported she does NOT feel he needs OT/SP. DME - patient has all. Aftercare - HHC - Nursing/PT - she declines OT/SP. Discussed with patient - referral to [facility]. FT 4/26 pm.    The Plan for Transition of Care is related to the following treatment goals: home    The Patient and/or patient representative Kierra Last was provided with a choice of provider and agrees   with the discharge plan. [x] Yes [] No    Freedom of choice list was provided with basic dialogue that supports the patient's individualized plan of care/goals, treatment preferences and shares the quality data associated with the providers.  [x] Yes [] No    Vanessa High

## 2021-04-23 NOTE — PROGRESS NOTES
Physical Therapy  Daily Treatment Note  Evaluating Therapist: Gilbert Vu DPT    NAME: Chelly Escobar  ROOM: [id number]  DIAGNOSIS: Ischemic cerebral vascular accident -  R MCA  PRECAUTIONS: Falls, L hemiparesis, L inattention   PROCEDURE(S): 4/13 tPA, IR mechanical art intracranial thrombectomy    HPI: Presented on 4/13/21 with left sided weakness. He has history of prior stroke and is on asa and plavix as well as CAD, HLD, HTN, and MI. NIHSS 15. He was found to have right MCA M1 occlusion. Dr. Angelic Ham completed successful thrombectomy with TICI 3 reperfusion and received TPA. Social:  Pt lives with wife in a 1 story floor plan with 3 steps and 2 rails to enter. Pt uses a ramp and transport chair to enter/exit home. Prior to admission pt reports not doing much walking around the house and primarily used a transport chair to get around. Pt states that he uses a \"Upwalker\" to get into/out of his truck. Pt also owns a transport wheelchair, power wheelchair, and canes. Initial Evaluation  4/16/21 AM     PM    Short Term Goals Long Term Goals    Was pt agreeable to Eval/treatment? yes yes yes     Does pt have pain? L hip pain - hematoma from a fall at home L knee ache; L thigh/calf pain L knee ache; L thigh/calf pain     Bed Mobility  Rolling: SBA  Supine to sit: SBA  Sit to supine: SBA  Scooting: SBA Supine<>sit: Supervision  Rolling: Supervision Supine to sit: Supervision Supervision Independent   Transfers Sit to stand:  Mod A  Stand to sit: Mod A  Stand pivot: Min A with [employer] and LOLI Brennan Sit to stand: Supervision  Stand to sit: Supervision  Stand pivot: SBA with [employer]; SBA with UpWalker Sit to stand: Supervision  Stand to sit: Supervision  Stand pivot: SBA with [employer]; SBA with UpWalker Min A Supervision   Ambulation   50 feet with UpWalker with Min A 60 feet x1 rep and 50 feet x1 rep with WW SBA + WC follow for safety  150 feet with UpWalker SBA + WC follow for safety 75 feet with UpWalker  feet with AAD limited in therapy by his generalized weakness and impaired endurance. Future PT sessions will continue to focus on improving the pt's overall strength, function, and endurance. Bed/chair alarm: armed after each session    Patient/family education  Pt/family educated on safety with functional mobility, leaning forward during sit<>stand transfers    Patient/family response to education:   Pt/family verbalized understanding Pt/family demonstrated skill Pt/family requires further education in this area   yes With cues yes     AM   Time in: 1045  Time out: 1130  PM  Time in: 1300  Time out: 1345    Pt is making fair+ progress toward established Physical Therapy goals. Continue with physical therapy current plan of care.     Clara Ontiveros DPT [id number]

## 2021-04-23 NOTE — PROGRESS NOTES
Progress Note    Subjective/   66y.o. year old male on the rehab unit for stroke. Requesting urology consult and discussed this with Dr. Marj Da Silva. Consult was ordered and note appreciated. He is tolerating therapy program.  Complaining of some nausea at times. Worse when he is concentrating on tasks. Objective/   VITALS:  BP (!) 145/68   Pulse 76   Temp 97.5 °F (36.4 °C) (Oral)   Resp 18   Ht 6' 1\" (1.854 m)   Wt 270 lb 6.4 oz (122.7 kg)   SpO2 94%   BMI 35.67 kg/m²   24HR INTAKE/OUTPUT:      Intake/Output Summary (Last 24 hours) at 4/22/2021 2143  Last data filed at 4/22/2021 5654  Gross per 24 hour   Intake 1260 ml   Output 1830 ml   Net -570 ml     Constitutional:  Alert, awake, no apparent distress   Cardiovascular:  S1, S2 without m/r/g   Respiratory:  CTA B without w/r/r, decreased breath sounds throughout  Abdomen: +BS  Ext: no pitting LE edema  Neuro: awake and alert, good sitting balance. No tremor. Functional Level      Date   Status   AE    Comments     Feeding   4/19/21   s/u             Grooming   4/21/21   S/u                    Oral Care   4/21/21   S/u             Bathing   4/21/21   Ub: supervision     LB: MIN A Shower bench          UB Dressing   4/21/21   S/u             LB Dressing   4/21/21   Mod A   reacher         Footwear   4/21/21   Mod/max A       SBA Reacher sock aid       Toileting   4/21/21   Min A             Homemaking                          Functional Transfers / Balance:      Date Status DME  Comments   Sit Balance 4/21/21   SBA       Stand Balance 4/22/21   CGA       []? Tub  [x]? Shower   Transfer 4/21/21 Min A Shower bench, w/w     Commode   Transfer 4/21/21   CGA Standard toilet, w/w     Functional   Mobility 4/21/21   CGA W/w      Other:  EOB>WC    4/22/21    Min A      SPT with VCs for safety and hand placement      Functional Exercises / Activity:  Copy pattern pegboard activity with focus on L FMC, problem solving and sequencing.  Pt complete with increased time to complete.      Mod resistive power  X 50 reps to each hand to increase strength for independence with ADLs      Arm Bike X 3 Mins forward and X 2 Mins backward standing with X 2 Min seated rest break to increase BUE strength, endurance, balance and stand tolerance. Pt completes with fair- endurance and stand tolerance noted. SBA-CGA for stand balance demo'd.      Min resistive theraband 3 X 10 horizontal abduction/adduction and elbow flexion to increase BUE strength and endurance for independence with functional tasks and transfers. Sensory / Neuromuscular Re-Education:        Cognitive Skills:    Status Comments   Problem   Solving fair      Memory fair      Sequencing fair      Safety fair             Scheduled Meds:   cefTRIAXone (ROCEPHIN) IV  2,000 mg Intravenous Q24H    losartan  50 mg Oral Daily    tamsulosin  0.4 mg Oral Nightly    ferrous sulfate  325 mg Oral Daily with breakfast    fluticasone  2 spray Each Nostril Daily    aspirin  81 mg Oral Daily    atorvastatin  80 mg Oral Daily    baclofen  10 mg Oral TID    clopidogrel  75 mg Oral Daily    enoxaparin  40 mg Subcutaneous Daily    metoprolol tartrate  50 mg Oral BID     Continuous Infusions:  PRN Meds:bismuth subsalicylate, bisacodyl, acetaminophen, magnesium hydroxide  I/O last 3 completed shifts: In: 1000 [P.O.:900; IV Piggyback:100]  Out: 2030 [Urine:2030]  I/O this shift:  In: 360 [P.O.:360]  Out: 900 [Urine:900]    Labs reviewed  CBC:   Recent Labs     04/20/21  0429 04/22/21  1530   WBC 10.0 9.2   HGB 9.7* 10.1*    337     BMP:    Recent Labs     04/20/21  0429 04/22/21  1530    142   K 3.5 4.1    103   CO2 27 26   BUN 11 13   CREATININE 0.9 1.1   GLUCOSE 86 94     Hepatic:   Recent Labs     04/20/21  0429 04/22/21  1530   AST 27 27   ALT 20 25   BILITOT 1.0 0.8   ALKPHOS 52 62     BNP: No results for input(s): BNP in the last 72 hours.   Lipids: No results for input(s): CHOL, Ayan Dalton MD

## 2021-04-23 NOTE — PROGRESS NOTES
Speech Language Pathology  ACUTE REHABILITATION--DAILY PROGRESS NOTE            SUMMARY OF EVALUATION    ADMITTING DIAGNOSIS: Ischemic cerebral vascular accident (CVA) due to stenosis of large extracranial artery (HCC) [I63.20]  [facility] [I63.9]                 DYSPHAGIA DIAGNOSIS:  Mild oral stage dysphagia attributed to mild left labiobuccal and lingual weakness and resolving xerostomia. Pharyngeal stage appears functional.                            DIET RECOMMENDATIONS:   Recommend advance to regular consistency solids. Continue thin liquids.                 FEEDING RECOMMENDATIONS:                           Assistance level:  No assistance needed.                              Compensatory strategies recommended: Small bites/sips     THERAPY RECOMMENDATIONS:                       SPEECH PATHOLOGY DIAGNOSIS:    Moderate STM deficit. Mild overall cognitive deficit. Mild dysarthria with left labiobuccal and lingual weakness.      THERAPY RECOMMENDATIONS:   Speech Pathology intervention is recommended 3-6 times per week for LOS or when goals are met with emphasis on the following:      Improve STM recall, sequencing and problem solving for increased functional independence with completion of ADLs/IADLs to a level commensurate with supervision.     Increase speech intelligibility by               -- improving strength/ROM of the facial and lingual musculature to facilitate and sustain accuracy of articulator placement.               -- articulation drill training to promote precision of phoneme production at the word and sentence levels (goal met given 90% accuracy of production with unknown context).                                                                                                                                            FIMS SCORES                            Swallowing                          Current Status               6--Modified Independent               Short Term Goal

## 2021-04-23 NOTE — PROGRESS NOTES
Speech Language Pathology  ACUTE REHABILITATION--DAILY PROGRESS NOTE            SUMMARY OF EVALUATION    ADMITTING DIAGNOSIS: Ischemic cerebral vascular accident (CVA) due to stenosis of large extracranial artery (HCC) [I63.20]  [facility] [I63.9]                 DYSPHAGIA DIAGNOSIS:  Mild oral stage dysphagia attributed to mild left labiobuccal and lingual weakness and resolving xerostomia. Pharyngeal stage appears functional.                            DIET RECOMMENDATIONS:   Recommend advance to regular consistency solids. Continue thin liquids.                 FEEDING RECOMMENDATIONS:                           Assistance level:  No assistance needed.                              Compensatory strategies recommended: Small bites/sips     THERAPY RECOMMENDATIONS:                       SPEECH PATHOLOGY DIAGNOSIS:    Moderate STM deficit. Mild overall cognitive deficit. Mild dysarthria with left labiobuccal and lingual weakness.      THERAPY RECOMMENDATIONS:   Speech Pathology intervention is recommended 3-6 times per week for LOS or when goals are met with emphasis on the following:      Improve STM recall, sequencing and problem solving for increased functional independence with completion of ADLs/IADLs to a level commensurate with supervision.     Increase speech intelligibility by               -- improving strength/ROM of the facial and lingual musculature to facilitate and sustain accuracy of articulator placement.               -- articulation drill training to promote precision of phoneme production at the word and sentence levels (goal met given 90% accuracy of production with unknown context).                                                                                                                                            FIMS SCORES                            Swallowing                          Current Status               6--Modified Independent               Short Term Goal 6--Modified Independent                       Long Term Goal          6--Modified Independent              Receptive                          Current Status             5--Supervision               Short Term Goal         6--Modified Independent                       Long Term Goal          6--Modified Independent              Expressive                          Current Status             5--Supervision               Short Term Goal         6--Modified Independent                       Long Term Goal          6--Modified Independent              Social Interaction                          Current Status             4--Minimal Assistance               Short Term Goal         5--Supervision               Long Term Goal          5--Supervision                                                     Problem Solving                          Current Status             4--Minimal Assistance               Short Term Goal         5--Supervision               Long Term Goal          5--Supervision                  Memory                          Current Status             3--Moderate Assistance                        Short Term Goal         4--Minimal Assistance               Long Term Goal          5--Supervision                             SWALLOWING:      Diet:   Recommend advance to regular consistency solids. Continue thin liquids      Supervision is no longer required during mealtimes. LANGUAGE:     Benefits from increased time and occasional cues. Per patient, he has decreased auditory acuity and discrimination; reports issue as longstanding. COGNITION:        Required consistent mod v/c  to identify action depicted in sets of semantically related photo cards and to then place them in sequential order (6-8 cards/set). Fair/fair- outcome. Safety:  Fair-; SLP educated patient multiple times regarding need to request assistance (and not transfer self or take self to bathroom).      SPEECH:     Mild dysarthria with left labiobuccal and lingual weakness. Patient feels his speech is at baseline. SP recommended after discharge: Yes pending discussion with patient's wife (patient feels he is at baseline from previous CVAs ~3 and ~5 years ago). Supervision recommended at discharge: 24 hour    Will continue SP intervention as per previously established POC.     Minute Tracking:    Individual therapy:   45 minutes  Concurrent therapy:    0 minutes  Group therapy:     0 minutes  Co-treatment therapy:    0 minutes    Total minutes for 4/21/2021: 45 minutes

## 2021-04-23 NOTE — PROGRESS NOTES
[address] [place] [address] [facility]  Progress Note    Chief complaint: Fever and urinary retention    SUBJECTIVE:  Patient is tolerating medications. No reported adverse drug reactions. No nausea, vomiting, diarrhea. Resting in bed. Review of systems:  As stated above in the chief complaint, otherwise negative. Medications:  Scheduled Meds:   cefTRIAXone (ROCEPHIN) IV  2,000 mg Intravenous Q24H    losartan  50 mg Oral Daily    tamsulosin  0.4 mg Oral Nightly    ferrous sulfate  325 mg Oral Daily with breakfast    fluticasone  2 spray Each Nostril Daily    aspirin  81 mg Oral Daily    atorvastatin  80 mg Oral Daily    baclofen  10 mg Oral TID    clopidogrel  75 mg Oral Daily    enoxaparin  40 mg Subcutaneous Daily    metoprolol tartrate  50 mg Oral BID     Continuous Infusions:  PRN Meds:bismuth subsalicylate, bisacodyl, acetaminophen, magnesium hydroxide    OBJECTIVE:  BP (!) 160/76   Pulse 78   Temp 97.9 °F (36.6 °C) (Oral)   Resp 18   Ht 6' 1\" (1.854 m)   Wt 270 lb 6.4 oz (122.7 kg)   SpO2 94%   BMI 35.67 kg/m²   Temp  Av.7 °F (36.5 °C)  Min: 97.5 °F (36.4 °C)  Max: 97.9 °F (36.6 °C)  Constitutional: The patient is awake, alert, and oriented. Skin: Warm and dry. No rashes were noted. HEENT: Round and reactive pupils. Moist mucous membranes. No ulcerations or thrush. Neck: Supple to movements. Chest: No use of accessory muscles to breathe. Symmetrical expansion. No wheezing, crackles or rhonchi. Cardiovascular: S1 and S2 are rhythmic and regular. No murmurs appreciated. Abdomen: Positive bowel sounds to auscultation. Benign to palpation. No masses felt. No hepatosplenomegaly. Genitourinary: Male, barriga yellow urine  Extremities: No clubbing, no cyanosis, no edema.   Lines: peripheral    Laboratory and Tests Review:  Lab Results   Component Value Date    WBC 9.2 2021    WBC 10.0 2021    WBC 20.3 (H) 2021    HGB 10.1 (L) 2021    HCT Final     MRSA Culture Only   Date Value Ref Range Status   06/22/2016 Methicillin resistant Staph aureus not isolated  Final       ASSESSMENT:  · UTI with bacteremia Aerococcus associated with Aerococcus and urine manipulation    PLAN:  · Continue with Rocephin over the weekend then switch to HERZOG KATIE for discharge  · Check final cultures  · Monitor labs  · Repeat blood culture-pending  · Urology to see- barriga inserted, will continue outpatient    Robert Vallejo  8:25 AM  4/23/2021   Pt seen and examined. Above discussed agree with advanced practice nurse. Labs, cultures, and radiographs reviewed. Face to Face encounter occurred. Changes made as necessary.      Lavell Perez MD

## 2021-04-23 NOTE — PROGRESS NOTES
Progress Note    Subjective/   66y.o. year old male on the rehab unit for stroke. Tolerating therapy. No further epistaxis. No further bleeding. No nausea or vomiting. Objective/   VITALS:  BP (!) 160/76   Pulse 78   Temp 97.9 °F (36.6 °C) (Oral)   Resp 18   Ht 6' 1\" (1.854 m)   Wt 270 lb 6.4 oz (122.7 kg)   SpO2 94%   BMI 35.67 kg/m²   24HR INTAKE/OUTPUT:      Intake/Output Summary (Last 24 hours) at 4/23/2021 1448  Last data filed at 4/23/2021 1413  Gross per 24 hour   Intake 1200 ml   Output 1150 ml   Net 50 ml     Constitutional:  Alert, awake, no apparent distress   Cardiovascular:  S1, S2 without m/r/g   Respiratory:  CTA B without w/r/r   Abdomen: Positive bowel sounds  Ext: No calf tenderness or cords. No LE edema  Neuro: Awake and alert. Good sitting balance. Functional Level    Initial Evaluation  4/16/21 AM     PM    Short Term Goals Long Term Goals    Was pt agreeable to Eval/treatment? yes yes yes       Does pt have pain? L hip pain - hematoma from a fall at home L knee ache; L thigh/calf pain L knee ache; L thigh/calf pain       Bed Mobility  Rolling: SBA  Supine to sit: SBA  Sit to supine: SBA  Scooting: SBA Supine<>sit: Supervision  Rolling: Supervision Supine to sit: Supervision Supervision Independent   Transfers Sit to stand:  Mod A  Stand to sit: Mod A  Stand pivot: Min A with [employer] and [employer] Brennan Sit to stand: Supervision  Stand to sit: Supervision  Stand pivot: SBA with [employer]; SBA with [employer]Walker Sit to stand: Supervision  Stand to sit: Supervision  Stand pivot: SBA with [employer]; SBA with UpWalker Min A Supervision   Ambulation   50 feet with UpWalker with Min A 60 feet x1 rep and 50 feet x1 rep with [employer] SBA + WC follow for safety  150 feet with UpWalker SBA + WC follow for safety 75 feet with UpWalker  feet with AAD with SBA >150 feet with AAD with Supervision   Walking 10 feet on uneven surface 10 feet with UpWalker with Mod A NT NT 10 feet with AAD with Min A 10 feet with AAD with Supervision   Wheel Chair Mobility 25 feet Max A NT NT 50 feet  feet Mod I   Car Transfers NT - deferred today due to pt's difficulty with STS from high chair NT NT Min A Supervision   Stair negotiation: ascended and descended 1 step x2 reps with B rails with Min A - backwards descending 4 steps with B rails SBA/CGA - backwards descending  NT 4 steps with B rail with Min A 4 steps with B rail with Supervision   Curb Step:   ascended and descended NT NT NT       Picking up object off the floor Picked up a cone with Min A NT NT Will  an object with SBA Will  an object with Supervision   BLE ROM WFL WFL         BLE Strength RLE 5/5  LLE 4-/5 RLE 5/5  LLE 4-/5         Balance  Sitting: Supervision  Standing: Min/Mod A Sitting: Supervision  Standing: SBA with WW/UpWalker               Scheduled Meds:   cefTRIAXone (ROCEPHIN) IV  2,000 mg Intravenous Q24H    losartan  50 mg Oral Daily    tamsulosin  0.4 mg Oral Nightly    ferrous sulfate  325 mg Oral Daily with breakfast    fluticasone  2 spray Each Nostril Daily    aspirin  81 mg Oral Daily    atorvastatin  80 mg Oral Daily    baclofen  10 mg Oral TID    clopidogrel  75 mg Oral Daily    enoxaparin  40 mg Subcutaneous Daily    metoprolol tartrate  50 mg Oral BID     Continuous Infusions:  PRN Meds:bismuth subsalicylate, bisacodyl, acetaminophen, magnesium hydroxide  I/O last 3 completed shifts: In: 1260 [P.O.:1260]  Out: 1150 [Urine:1150]  I/O this shift: In: 840 [P.O.:840]  Out: 250 [Urine:250]    Labs reviewed  CBC:   Recent Labs     04/22/21  1530   WBC 9.2   HGB 10.1*        BMP:    Recent Labs     04/22/21  1530      K 4.1      CO2 26   BUN 13   CREATININE 1.1   GLUCOSE 94     Hepatic:   Recent Labs     04/22/21  1530   AST 27   ALT 25   BILITOT 0.8   ALKPHOS 62     BNP: No results for input(s): BNP in the last 72 hours. Lipids: No results for input(s): CHOL, HDL in the last 72 hours.     Invalid input(s): LDLCALCU  INR: No results for input(s): INR in the last 72 hours. Assessment/  Patient Active Problem List:     Cerebrovascular accident (CVA) due to occlusion of right middle cerebral artery ([address]     Coronary artery disease involving native coronary artery of native heart without angina pectoris     History of myocardial infarction     Essential hypertension     Mixed hyperlipidemia     Status post placement of implantable loop recorder     Acute ischemic stroke ([address]     History of CVA (cerebrovascular accident) without residual deficits     History of TIA (transient ischemic attack)     Acute CVA (cerebrovascular accident) ([address]     Ischemic cerebral vascular accident (CVA) due to stenosis of large extracranial artery ([address]     Red blood cell antibody positive     Ischemic stroke ([address]      Plan/  1. See team conference note - ERIC 4/30/2021  2. Discussed with nursing and staff who will contact patient advocate and have them contact patients wife. 3. Discussed conversation with patient that I had with his wife in brief. 4. He is agreeable to maintain the [place] catheter  5. ID note appreciated  6. Continue to mobilize as able to reduce the risk of complications.           Wilder Alonso MD

## 2021-04-23 NOTE — PROGRESS NOTES
Occupational Therapy  OCCUPATIONAL THERAPY DAILY NOTE    Date:2021  Patient Name: Shade Nunez  MRN: [id number]  : 1950  Room: [address] [place], [place] [address]A     Diagnosis: CVA- RMCA  Precautions: Fall Risk & min [place]    Functional Assessment:   Date Status AE  Comments   Feeding 21 s/u     Grooming 21 S/u            Oral Care 21 S/u     Bathing 21 Ub: supervision   LB: MIN A Shower bench     UB Dressing 21 S/u     LB Dressing 21 Mod A reacher    Footwear 21 Mod/max A    SBA Reacher sock aid    Toileting 21 Min A     Homemaking 21 CGA rollator In kitchen pt made coffee using platform rollator. Pt required set up assistance to open package properly and CGA for safety d/t decreased stand tolerance. Functional Transfers / Balance:   Date Status DME  Comments   Sit Balance 21 SBA     Stand Balance 21 CGA     [] Tub  [x] Shower   Transfer 21 Min A Shower bench, w/w    Commode   Transfer 21 CGA Standard toilet, w/w    Functional   Mobility 21 SBA-CGA W/w     Other:  EOB>WC   21   Min A    SPT with VCs for safety and hand placement     Functional Exercises / Activity:  [employer] with 5# weight counter clockwise, clockwise and forward/backward X 30 reps on each seated and X 30 on each standing to increase endurance, BUE strength/ROM and stand balance. Pt completes with SBA for safety and requires seated rest break between each set of standing. 2# free weight 3 X 10 to increase BUE strength on all planes for independence with functional tasks and transfers. Purdue pegboard to increase B hand coordination and [address] for independence with functional tasks. Pt completes with VCs to alternate hands during activity. Leisure task with focus on sequencing and STM recall. Pt participated in activity with Min VCs to problem solve.      Sensory / Neuromuscular Re-Education:      Cognitive Skills:   Status Comments   Problem   Solving fair     Memory fair     Sequencing fair     Safety fair       Visual Perception:    Education:  Pt educated on hand placement and safety during functional transfers. Pt educated on safety during homemaking task.   [] Family teach completed on:    Pain Level: no c/o pain this date. Additional Notes:       Patient has made good  progress during treatment sessions toward set goals. Therapy emphasis to obtain goals:Strengthening, Gait Training, Patient/Caregiver Education & Training, Home Management Training, ROM, Equipment Evaluation, Education, & procurement, Balance Training, Neuromuscular Re-education, Functional Mobility Training, Cognitive Reorientation, Positioning, Endurance Training, Safety Education & Training, Self-Care / ADL    [x] Continue with current OT Plan of care.   [] Prepare for Discharge     DISCHARGE RECOMMENDATIONS  Long term goals  Time Frame for Long term goals : 2-3 weeks to address above problem areas  Long term goal 1: Pt demo independent to eat all meals  Long term goal 2: Pt demo Mod I grooming seated  Long term goal 3: Pt demo SBA to bathe @ shower level both seated & standing using a long handled sponge  Long term goal 4: Pt demo I UUE & Mod I to don underwear & pants & min A to don socks & shoes  Long term goal 5: Pt demo Mod I commode trf with appropraite DME to ensure pt safety  Long term goals 6: Pt demo SBA walk in shower trf using approrpiate DME to ensure pt safety  Long term goal 7: Pt demo SBA walk in shower trf using appropriate DME to ensure pt safety  Long term goal 8: Pt dmeo G- endurance for a 20 minute functional activity  Long term goal 9: Pt demo improved [address] & strength BUE to improve pt ability to complete ADLs as evidenced by gains in the following areas: 9- hole peg test- L 1 minute & .5 sec & norm for pt age & gender is 21.4 sec & R hand 34.2 sec & norm for pt age & gender is 19.9 sec &  strength- L hand 40# & norm for tp age & gender is 65# & R hand 62# & norm for pt age & gender is 75#    Recommended DME:    Post Discharge Care:   []Home Independently  []Home with 24hr Care / Supervision []Home with Partial Supervision []Home with Home Health OT []Home with Out Pt OT []Other: ___   Comments:         Time in Time out Tx Time Breakdown  Variance:   First Session  10:00 10:45 [] Individual Tx-  [x] Concurrent Tx -  45 Mins  [] Co-Tx -   [] Group Tx -   [] Time Missed -     Second Session 1:45 2:30 [] Individual Tx-   [x] Concurrent Tx -45 Mins[] Co-Tx -   [] Group Tx -   [] Time Missed -     Third Session    [] Individual Tx-   [] Concurrent Tx -  [] Co-Tx -   [] Group Tx -   [] Time Missed -         Total Tx Time  90 Mins      Francis   QUINONES/L [id number]  I have read the above note and agree with the documentation.   Para Bible OTR/L [id number]

## 2021-04-24 ENCOUNTER — APPOINTMENT (OUTPATIENT)
Dept: GENERAL RADIOLOGY | Age: 71
DRG: 056 | End: 2021-04-24
Attending: PHYSICAL MEDICINE & REHABILITATION
Payer: MEDICARE

## 2021-04-24 PROCEDURE — 1280000000 HC REHAB R&B

## 2021-04-24 PROCEDURE — 97530 THERAPEUTIC ACTIVITIES: CPT

## 2021-04-24 PROCEDURE — 6370000000 HC RX 637 (ALT 250 FOR IP): Performed by: INTERNAL MEDICINE

## 2021-04-24 PROCEDURE — 6360000002 HC RX W HCPCS: Performed by: INTERNAL MEDICINE

## 2021-04-24 PROCEDURE — 97130 THER IVNTJ EA ADDL 15 MIN: CPT

## 2021-04-24 PROCEDURE — 97129 THER IVNTJ 1ST 15 MIN: CPT

## 2021-04-24 PROCEDURE — 6360000002 HC RX W HCPCS: Performed by: SPECIALIST

## 2021-04-24 PROCEDURE — 73620 X-RAY EXAM OF FOOT: CPT

## 2021-04-24 PROCEDURE — 2580000003 HC RX 258: Performed by: SPECIALIST

## 2021-04-24 RX ADMIN — FERROUS SULFATE TAB 325 MG (65 MG ELEMENTAL FE) 325 MG: 325 (65 FE) TAB at 10:03

## 2021-04-24 RX ADMIN — ENOXAPARIN SODIUM 40 MG: 40 INJECTION SUBCUTANEOUS at 10:03

## 2021-04-24 RX ADMIN — BACLOFEN 10 MG: 10 TABLET ORAL at 14:39

## 2021-04-24 RX ADMIN — ATORVASTATIN CALCIUM 80 MG: 80 TABLET, FILM COATED ORAL at 10:09

## 2021-04-24 RX ADMIN — FLUTICASONE PROPIONATE 2 SPRAY: 50 SPRAY, METERED NASAL at 10:03

## 2021-04-24 RX ADMIN — ASPIRIN 81 MG CHEWABLE TABLET 81 MG: 81 TABLET CHEWABLE at 10:09

## 2021-04-24 RX ADMIN — CEFTRIAXONE SODIUM 2000 MG: 2 INJECTION, POWDER, FOR SOLUTION INTRAMUSCULAR; INTRAVENOUS at 14:39

## 2021-04-24 RX ADMIN — BACLOFEN 10 MG: 10 TABLET ORAL at 21:40

## 2021-04-24 RX ADMIN — CLOPIDOGREL 75 MG: 75 TABLET, FILM COATED ORAL at 10:04

## 2021-04-24 RX ADMIN — BACLOFEN 10 MG: 10 TABLET ORAL at 10:04

## 2021-04-24 RX ADMIN — TAMSULOSIN HYDROCHLORIDE 0.4 MG: 0.4 CAPSULE ORAL at 21:40

## 2021-04-24 RX ADMIN — METOPROLOL TARTRATE 50 MG: 50 TABLET, FILM COATED ORAL at 21:40

## 2021-04-24 RX ADMIN — ACETAMINOPHEN 650 MG: 325 TABLET ORAL at 21:40

## 2021-04-24 RX ADMIN — METOPROLOL TARTRATE 50 MG: 50 TABLET, FILM COATED ORAL at 10:11

## 2021-04-24 RX ADMIN — LOSARTAN POTASSIUM 50 MG: 50 TABLET, FILM COATED ORAL at 10:12

## 2021-04-24 ASSESSMENT — PAIN SCALES - GENERAL
PAINLEVEL_OUTOF10: 0
PAINLEVEL_OUTOF10: 5

## 2021-04-24 NOTE — PROGRESS NOTES
Physical Therapy  Daily Treatment Note  Evaluating Therapist: Husam Aaron DPT    NAME: Wilbert Alvarenga  ROOM: [id number]  DIAGNOSIS: Ischemic cerebral vascular accident -  R MCA  PRECAUTIONS: Falls, L hemiparesis, L inattention   PROCEDURE(S): 4/13 tPA, IR mechanical art intracranial thrombectomy    HPI: Presented on 4/13/21 with left sided weakness. He has history of prior stroke and is on asa and plavix as well as CAD, HLD, HTN, and MI. NIHSS 15. He was found to have right MCA M1 occlusion. Dr. Scott Duval completed successful thrombectomy with TICI 3 reperfusion and received TPA. Social:  Pt lives with wife in a 1 story floor plan with 3 steps and 2 rails to enter. Pt uses a ramp and transport chair to enter/exit home. Prior to admission pt reports not doing much walking around the house and primarily used a transport chair to get around. Pt states that he uses a \"Upwalker\" to get into/out of his truck. Pt also owns a transport wheelchair, power wheelchair, and canes. Initial Evaluation  4/16/21 AM      Short Term Goals Long Term Goals    Was pt agreeable to Eval/treatment? yes yes      Does pt have pain? L hip pain - hematoma from a fall at home L knee ache; L thigh/calf pain      Bed Mobility  Rolling: SBA  Supine to sit: SBA  Sit to supine: SBA  Scooting: SBA Supine<>sit: Supervision  Rolling: Supervision  Supervision Independent   Transfers Sit to stand:  Mod A  Stand to sit: Mod A  Stand pivot: Min A with [employer] and LOLI Brennan Sit to stand: Supervision  Stand to sit: Supervision  Stand pivot: SBA no AD  Min A Supervision   Ambulation   50 feet with UpWalker with Min A 100 feet and 150 feet with UpWalker SBA + WC follow for safety  150 feet with AAD with SBA >150 feet with AAD with Supervision   Walking 10 feet on uneven surface 10 feet with UpWalker with Mod A NT  10 feet with AAD with Min A 10 feet with AAD with Supervision   Wheel Chair Mobility 25 feet Max A NT  50 feet  feet Mod I   Car Transfers NT - deferred today due to pt's difficulty with STS from high chair NT  Min A Supervision   Stair negotiation: ascended and descended 1 step x2 reps with B rails with Min A - backwards descending 4 steps with B rails SBA/CGA - backwards descending   4 steps with B rail with Min A 4 steps with B rail with Supervision   Curb Step:   ascended and descended NT NT      Picking up object off the floor Picked up a cone with Min A NT  Will  an object with SBA Will  an object with Supervision   BLE ROM WFL WFL      BLE Strength RLE 5/5  LLE 4-/5 RLE 5/5  LLE 4-/5      Balance  Sitting: Supervision  Standing: Min/Mod A Sitting: Supervision  Standing: SBA with WW/UpWalker      Date Family Teach Completed TBA TBA      Is additional Family Teaching Needed? Y or N Y Y      Hindering Progress Weakness Weakness      PT recommended ELOS 2-3 weeks       Team's Discharge Plan        Therapist at Team Meeting          Therapeutic Exercise:   AM:   - Functional mobility as noted above in chart  - Sit<>stand x5 reps    Additional Comments:  Pt received supine in bed and agreeable to PT intervention. Pt continues to report LLE pain with mobility. Pt requests to use UpWalker over front [employer] and was able to tolerate further distances on this date. Increased time to complete tasks and extended rest breaks required between bouts of activity. At end of session, pt returned to room and returned to supine. Pt left with all needs met and call light in reach. Pt requires continued skilled PT intervention for the purposes of maximizing functional mobility and independence by addressing deficits described above. Bed/chair alarm: armed after each session    Patient/family education  Pt/family educated on safety with functional mobility.     Patient/family response to education:   Pt/family verbalized understanding Pt/family demonstrated skill Pt/family requires further education in this area   yes With cues yes     AM   Time in: 7050  Time out: 4579    Pt is making fair+ progress toward established Physical Therapy goals. Continue with physical therapy current plan of care.     Rick Olszewski, PT, DPT  [id number]

## 2021-04-24 NOTE — PROGRESS NOTES
Department of Internal Lemuel Tijerina M.D. Progress Note      SUBJECTIVE: Reports that he did full workout yesterday and feels achy today.   Encouraged him to work out just as hard today    OBJECTIVE abdomen soft nontender Warren is draining well    Medications    Current Facility-Administered Medications: sodium chloride flush 0.9 % injection 5-40 mL, 5-40 mL, Intravenous, PRN  cefTRIAXone (ROCEPHIN) 2,000 mg in sterile water 20 mL IV syringe, 2,000 mg, Intravenous, A51A  bismuth subsalicylate (PEPTO BISMOL) 262 MG/15ML suspension 30 mL, 30 mL, Oral, Q6H PRN  losartan (COZAAR) tablet 50 mg, 50 mg, Oral, Daily  tamsulosin (FLOMAX) capsule 0.4 mg, 0.4 mg, Oral, Nightly  ferrous sulfate (IRON 325) tablet 325 mg, 325 mg, Oral, Daily with breakfast  bisacodyl (DULCOLAX) suppository 10 mg, 10 mg, Rectal, Daily PRN  fluticasone (FLONASE) 50 MCG/ACT nasal spray 2 spray, 2 spray, Each Nostril, Daily  acetaminophen (TYLENOL) tablet 650 mg, 650 mg, Oral, Q4H PRN  aspirin chewable tablet 81 mg, 81 mg, Oral, Daily  atorvastatin (LIPITOR) tablet 80 mg, 80 mg, Oral, Daily  baclofen (LIORESAL) tablet 10 mg, 10 mg, Oral, TID  clopidogrel (PLAVIX) tablet 75 mg, 75 mg, Oral, Daily  enoxaparin (LOVENOX) injection 40 mg, 40 mg, Subcutaneous, Daily  metoprolol tartrate (LOPRESSOR) tablet 50 mg, 50 mg, Oral, BID  magnesium hydroxide (MILK OF MAGNESIA) 400 MG/5ML suspension 30 mL, 30 mL, Oral, Daily PRN  Physical    VITALS:  BP (!) 127/58   Pulse 77   Temp 97.9 °F (36.6 °C) (Oral)   Resp 18   Ht 6' 1\" (1.854 m)   Wt 270 lb 6.4 oz (122.7 kg)   SpO2 94%   BMI 35.67 kg/m²   24HR INTAKE/OUTPUT:      Intake/Output Summary (Last 24 hours) at 4/24/2021 0712  Last data filed at 4/24/2021 0253  Gross per 24 hour   Intake 1320 ml   Output 2200 ml   Net -880 ml     LUNGS:  No increased work of breathing, good air exchange, clear to auscultation bilaterally, no crackles or wheezing  CARDIOVASCULAR:  Normal apical impulse, regular rate and rhythm, normal S1 and S2, no S3 or S4, and no murmur noted  Data    CBC with Differential:    Lab Results   Component Value Date    WBC 9.2 04/22/2021    RBC 3.53 04/22/2021    HGB 10.1 04/22/2021    HCT 32.4 04/22/2021     04/22/2021    MCV 91.8 04/22/2021    MCH 28.6 04/22/2021    MCHC 31.2 04/22/2021    RDW 17.2 04/22/2021    LYMPHOPCT 25.0 04/22/2021    MONOPCT 9.3 04/22/2021    BASOPCT 0.5 04/22/2021    MONOSABS 0.86 04/22/2021    LYMPHSABS 2.31 04/22/2021    EOSABS 0.20 04/22/2021    BASOSABS 0.05 04/22/2021     CMP:    Lab Results   Component Value Date     04/22/2021    K 4.1 04/22/2021    K 3.7 04/16/2021     04/22/2021    CO2 26 04/22/2021    BUN 13 04/22/2021    CREATININE 1.1 04/22/2021    GFRAA >60 04/22/2021    LABGLOM >60 04/22/2021    GLUCOSE 94 04/22/2021    GLUCOSE 90 03/28/2012    PROT 6.0 04/22/2021    LABALBU 3.6 04/22/2021    CALCIUM 8.9 04/22/2021    BILITOT 0.8 04/22/2021    ALKPHOS 62 04/22/2021    AST 27 04/22/2021    ALT 25 04/22/2021     PT/INR:    Lab Results   Component Value Date    PROTIME 12.7 04/16/2021    INR 1.2 04/16/2021       ASSESSMENT AND PLAN      Active Problems:  Neurogenic bladder treated by Warren catheter    Ischemic cerebral vascular accident (CVA) due to stenosis of large extracranial artery ([address]  Plan: Recovering    Urinary tract infection being treated by antibiotics      Electronically signed by Nasir Duarte MD on 4/24/2021 at 7:31 AM

## 2021-04-24 NOTE — PROGRESS NOTES
[address] [place] [address] [facility]  Progress Note    Chief complaint: Fever and urinary retention  Face to face encounter   SUBJECTIVE:  Patient is tolerating medications. No reported adverse drug reactions. No nausea, vomiting, diarrhea. Resting in bed. No fevers. Has barriga in place. Review of systems:  As stated above in the chief complaint, otherwise negative. Medications:  Scheduled Meds:   cefTRIAXone (ROCEPHIN) IV  2,000 mg Intravenous Q24H    losartan  50 mg Oral Daily    tamsulosin  0.4 mg Oral Nightly    ferrous sulfate  325 mg Oral Daily with breakfast    fluticasone  2 spray Each Nostril Daily    aspirin  81 mg Oral Daily    atorvastatin  80 mg Oral Daily    baclofen  10 mg Oral TID    clopidogrel  75 mg Oral Daily    enoxaparin  40 mg Subcutaneous Daily    metoprolol tartrate  50 mg Oral BID     Continuous Infusions:  PRN Meds:sodium chloride flush, bismuth subsalicylate, bisacodyl, acetaminophen, magnesium hydroxide    OBJECTIVE:  /80   Pulse 74   Temp 98.2 °F (36.8 °C) (Oral)   Resp 17   Ht 6' 1\" (1.854 m)   Wt 270 lb 6.4 oz (122.7 kg)   SpO2 98%   BMI 35.67 kg/m²   Temp  Av.9 °F (36.6 °C)  Min: 97.5 °F (36.4 °C)  Max: 98.2 °F (36.8 °C)  Constitutional: The patient is awake, alert, and oriented. Skin: Warm and dry. No rashes were noted. HEENT: Round and reactive pupils. Moist mucous membranes. No ulcerations or thrush. Neck: Supple to movements. Chest: No use of accessory muscles to breathe. Symmetrical expansion. No wheezing, crackles or rhonchi. Cardiovascular: S1 and S2 are rhythmic and regular. Abdomen: Positive bowel sounds to auscultation. Genitourinary: Male, barriga yellow urine  Extremities: No clubbing, no cyanosis, ++edema.   Lines: peripheral  Barriga - yellow urine     Laboratory and Tests Review:  Lab Results   Component Value Date    WBC 9.2 2021    WBC 10.0 2021    WBC 20.3 (H) 2021    HGB 10.1 (L) 04/22/2021    HCT 32.4 (L) 04/22/2021    MCV 91.8 04/22/2021     04/22/2021     Lab Results   Component Value Date    NEUTROABS 5.72 04/22/2021    NEUTROABS 7.42 (H) 04/20/2021    NEUTROABS 17.53 (H) 04/19/2021     No results found for: CRPHS  Lab Results   Component Value Date    ALT 25 04/22/2021    AST 27 04/22/2021    ALKPHOS 62 04/22/2021    BILITOT 0.8 04/22/2021     Lab Results   Component Value Date     04/22/2021    K 4.1 04/22/2021    K 3.7 04/16/2021     04/22/2021    CO2 26 04/22/2021    BUN 13 04/22/2021    CREATININE 1.1 04/22/2021    CREATININE 0.9 04/20/2021    CREATININE 0.9 04/18/2021    GFRAA >60 04/22/2021    LABGLOM >60 04/22/2021    GLUCOSE 94 04/22/2021    GLUCOSE 90 03/28/2012    PROT 6.0 04/22/2021    LABALBU 3.6 04/22/2021    CALCIUM 8.9 04/22/2021    BILITOT 0.8 04/22/2021    ALKPHOS 62 04/22/2021    AST 27 04/22/2021    ALT 25 04/22/2021     No results found for: CRP  No results found for: [address]  Radiology:      Microbiology:   Lab Results   Component Value Date    [facility]  04/18/2021     This isolate is often of questionable clinical significance. Validated  susceptibility testing methods do not exist for this isolate. This organism was isolated in one set. Susceptibility testing is not routinely done as this  organism frequently represents skin contamination.       ORG Aerococcus urinae 04/18/2021    ORG Aerococcus urinae 04/18/2021     Lab Results   Component Value Date    BLOODCULT2 5 Days no growth 04/18/2021    ORG Aerococcus urinae 04/18/2021    ORG Aerococcus urinae 04/18/2021     Urine Culture, Routine   Date Value Ref Range Status   04/18/2021 <10,000 CFU/mL  Gram positive organism   (A)  Final   04/18/2021 >100,000 CFU/ml  Final     [facility]SA Culture Only   Date Value Ref Range Status   06/22/2016 Methicillin resistant Staph aureus not isolated  Final       ASSESSMENT:  · UTI with bacteremia Aerococcus associated with Aerococcus and urine manipulation  · Urinary retention - has barriga     PLAN:  · Continue with Rocephin over the weekend then switch to HERZOG KATIE for discharge  · Check final cultures  · Monitor labs  · Repeat blood culture-no growth   · Urology following     Betty rKuse  1:18 PM  4/24/2021   Pt seen and examined. Above discussed agree with advanced practice nurse. Labs, cultures, and radiographs reviewed. Face to Face encounter occurred. Changes made as necessary.      Marcel Casanova MD

## 2021-04-24 NOTE — PROGRESS NOTES
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 Pharyngeal stage appears functional.                            DIET RECOMMENDATIONS:   Recommend advance to regular consistency solids. Continue thin liquids.                 FEEDING RECOMMENDATIONS:                           Assistance level:  No assistance needed.                              Compensatory strategies recommended: Small bites/sips     THERAPY RECOMMENDATIONS:                       SPEECH PATHOLOGY DIAGNOSIS:    Moderate STM deficit. Mild overall cognitive deficit. Mild dysarthria with left labiobuccal and lingual weakness.      THERAPY RECOMMENDATIONS:   Speech Pathology intervention is recommended 3-6 times per week for LOS or when goals are met with emphasis on the following:      Improve STM recall, sequencing and problem solving for increased functional independence with completion of ADLs/IADLs to a level commensurate with supervision.     Increase speech intelligibility by               -- improving strength/ROM of the facial and lingual musculature to facilitate and sustain accuracy of articulator placement.               -- articulation drill training to promote precision of phoneme production at the word and sentence levels (goal met given 90% accuracy of production with unknown context).                                                                                                                                            FIMS SCORES                            Swallowing                          Current Status               6--Modified Independent               Short Term Goal 6--Modified Independent                       Long Term Goal          6--Modified Independent              Receptive                          Current Status              6--Modified Independent                 Short Term Goal         6--Modified Independent                       Long Term Goal          6--Modified Independent              Expressive                          Current Status              6--Modified Independent               Short Term Goal         6--Modified Independent                       Long Term Goal          6--Modified Independent              Social Interaction                          Current Status            5--Supervision              Short Term Goal         5--Supervision               Long Term Goal          5--Supervision                                                     Problem Solving                          Current Status             4--Minimal Assistance               Short Term Goal         5--Supervision               Long Term Goal          5--Supervision                  Memory                          Current Status             3--Moderate Assistance                        Short Term Goal         4--Minimal Assistance               Long Term Goal          5--Supervision                             SWALLOWING:      Diet:   Recommend advance to regular consistency solids. Continue thin liquids      Supervision is no longer required during mealtimes. LANGUAGE:     Benefits from increased time and occasional cues. Per patient, he has decreased auditory acuity and discrimination; reports issue as longstanding. COGNITION:       Decreased recall of today's events (items on breakfast/lunch trays, therapy tasks). The pt recalled details in simple paragraphs with 70% accuracy. Strategies for rehearsing reviewed with the pt. Good sustained attention for duration of task. Safety:  Fair-    SPEECH:     Mild dysarthria with left labiobuccal and lingual weakness. Patient feels his speech is at baseline. SP recommended after discharge: Yes pending discussion with patient's wife (patient feels he is at baseline from previous CVAs ~3 and ~5 years ago). Supervision recommended at discharge: 24 hour    Will continue SP intervention as per previously established POC.     Juanito Calero M.A., [address] [place] [address]  Speech Pathologist  4/24/2021     Minute Tracking:    Individual therapy:   30 minutes  Concurrent therapy:    0 minutes  Group therapy:     0 minutes  Co-treatment therapy:    0 minutes    Total minutes for 4/24/2021: 30 minutes

## 2021-04-24 NOTE — PROGRESS NOTES
Progress Note  Date:2021       Room:[address] [place] [address]-A  Patient Swetha Freeman     YOB: 1950     Age:71 y.o. Subjective    Subjective:  Symptoms:  Stable. He reports weakness. Diet:  Adequate intake. Activity level: Impaired due to weakness. Pain:  He reports no pain. Review of Systems   Neurological: Positive for weakness. Objective         Vitals Last 24 Hours:  TEMPERATURE:  Temp  Av.5 °F (36.4 °C)  Min: 97.5 °F (36.4 °C)  Max: 97.5 °F (36.4 °C)  RESPIRATIONS RANGE: Resp  Av  Min: 18  Max: 18  PULSE OXIMETRY RANGE: SpO2  Av %  Min: 94 %  Max: 94 %  PULSE RANGE: Pulse  Av.5  Min: 70  Max: 77  BLOOD PRESSURE RANGE: Systolic (47XZA), THT:668 , Min:127 , LLQ:373   ; Diastolic (61VFG), SFS:08, Min:58, Max:84    I/O (24Hr): Intake/Output Summary (Last 24 hours) at 2021 0751  Last data filed at 2021 2827  Gross per 24 hour   Intake 1320 ml   Output 2200 ml   Net -880 ml     Objective:  General Appearance: In no acute distress. Vital signs: (most recent): Blood pressure 130/84, pulse 70, temperature 97.5 °F (36.4 °C), temperature source Oral, resp. rate 18, height 6' 1\" (1.854 m), weight 270 lb 6.4 oz (122.7 kg), SpO2 94 %. Vital signs are normal.    Output: Producing urine and producing stool. Lungs:  Normal effort and normal respiratory rate. Breath sounds clear to auscultation. Heart: Normal rate. Irregular rhythm. S1 normal and S2 normal.    Abdomen: Abdomen is soft. Bowel sounds are normal.   There is no abdominal tenderness. Extremities: Normal range of motion. (Bruising left foot)  Neurological: Patient is alert. (4/5 strength).       Labs/Imaging/Diagnostics    Labs:  CBC:  Recent Labs     21  1530   WBC 9.2   RBC 3.53*   HGB 10.1*   HCT 32.4*   MCV 91.8   RDW 17.2*        CHEMISTRIES:  Recent Labs     21  1530      K 4.1      CO2 26   BUN 13   CREATININE 1.1   GLUCOSE 94     PT/INR:No

## 2021-04-25 PROCEDURE — 6360000002 HC RX W HCPCS: Performed by: SPECIALIST

## 2021-04-25 PROCEDURE — 2580000003 HC RX 258: Performed by: SPECIALIST

## 2021-04-25 PROCEDURE — 97535 SELF CARE MNGMENT TRAINING: CPT

## 2021-04-25 PROCEDURE — 1280000000 HC REHAB R&B

## 2021-04-25 PROCEDURE — 6360000002 HC RX W HCPCS: Performed by: INTERNAL MEDICINE

## 2021-04-25 PROCEDURE — 6370000000 HC RX 637 (ALT 250 FOR IP): Performed by: INTERNAL MEDICINE

## 2021-04-25 PROCEDURE — 97530 THERAPEUTIC ACTIVITIES: CPT

## 2021-04-25 RX ADMIN — ASPIRIN 81 MG CHEWABLE TABLET 81 MG: 81 TABLET CHEWABLE at 08:09

## 2021-04-25 RX ADMIN — FERROUS SULFATE TAB 325 MG (65 MG ELEMENTAL FE) 325 MG: 325 (65 FE) TAB at 08:08

## 2021-04-25 RX ADMIN — LOSARTAN POTASSIUM 50 MG: 50 TABLET, FILM COATED ORAL at 08:58

## 2021-04-25 RX ADMIN — FLUTICASONE PROPIONATE 2 SPRAY: 50 SPRAY, METERED NASAL at 08:00

## 2021-04-25 RX ADMIN — BACLOFEN 10 MG: 10 TABLET ORAL at 13:15

## 2021-04-25 RX ADMIN — ENOXAPARIN SODIUM 40 MG: 40 INJECTION SUBCUTANEOUS at 08:08

## 2021-04-25 RX ADMIN — METOPROLOL TARTRATE 50 MG: 50 TABLET, FILM COATED ORAL at 20:36

## 2021-04-25 RX ADMIN — TAMSULOSIN HYDROCHLORIDE 0.4 MG: 0.4 CAPSULE ORAL at 20:36

## 2021-04-25 RX ADMIN — ACETAMINOPHEN 650 MG: 325 TABLET ORAL at 20:35

## 2021-04-25 RX ADMIN — CLOPIDOGREL 75 MG: 75 TABLET, FILM COATED ORAL at 08:09

## 2021-04-25 RX ADMIN — BACLOFEN 10 MG: 10 TABLET ORAL at 08:09

## 2021-04-25 RX ADMIN — CEFTRIAXONE SODIUM 2000 MG: 2 INJECTION, POWDER, FOR SOLUTION INTRAMUSCULAR; INTRAVENOUS at 13:15

## 2021-04-25 RX ADMIN — BACLOFEN 10 MG: 10 TABLET ORAL at 20:35

## 2021-04-25 RX ADMIN — METOPROLOL TARTRATE 50 MG: 50 TABLET, FILM COATED ORAL at 08:07

## 2021-04-25 RX ADMIN — ATORVASTATIN CALCIUM 80 MG: 80 TABLET, FILM COATED ORAL at 08:08

## 2021-04-25 ASSESSMENT — PAIN DESCRIPTION - ONSET: ONSET: GRADUAL

## 2021-04-25 ASSESSMENT — PAIN DESCRIPTION - DESCRIPTORS: DESCRIPTORS: ACHING;THROBBING

## 2021-04-25 ASSESSMENT — PAIN DESCRIPTION - PROGRESSION: CLINICAL_PROGRESSION: NOT CHANGED

## 2021-04-25 ASSESSMENT — PAIN DESCRIPTION - FREQUENCY: FREQUENCY: INTERMITTENT

## 2021-04-25 ASSESSMENT — PAIN - FUNCTIONAL ASSESSMENT: PAIN_FUNCTIONAL_ASSESSMENT: ACTIVITIES ARE NOT PREVENTED

## 2021-04-25 NOTE — PROGRESS NOTES
[address] [place] [address] [facility]  Progress Note    Chief complaint: Fever and urinary retention  Face to face encounter   SUBJECTIVE:  Patient is tolerating medications. No reported adverse drug reactions. No nausea, vomiting, diarrhea. Resting in bed. Watching TV  No fevers. Has barriga in place. Review of systems:  As stated above in the chief complaint, otherwise negative. Medications:  Scheduled Meds:   cefTRIAXone (ROCEPHIN) IV  2,000 mg Intravenous Q24H    losartan  50 mg Oral Daily    tamsulosin  0.4 mg Oral Nightly    ferrous sulfate  325 mg Oral Daily with breakfast    fluticasone  2 spray Each Nostril Daily    aspirin  81 mg Oral Daily    atorvastatin  80 mg Oral Daily    baclofen  10 mg Oral TID    clopidogrel  75 mg Oral Daily    enoxaparin  40 mg Subcutaneous Daily    metoprolol tartrate  50 mg Oral BID     Continuous Infusions:  PRN Meds:sodium chloride flush, bismuth subsalicylate, bisacodyl, acetaminophen, magnesium hydroxide    OBJECTIVE:  BP (!) 142/80   Pulse 70   Temp 98.2 °F (36.8 °C) (Temporal)   Resp 18   Ht 6' 1\" (1.854 m)   Wt 297 lb 11.2 oz (135 kg)   SpO2 94%   BMI 39.28 kg/m²   Temp  Av.3 °F (36.8 °C)  Min: 98.2 °F (36.8 °C)  Max: 98.3 °F (36.8 °C)  Constitutional: The patient is awake, alert, and oriented. No issues. Skin: Warm and dry. No rashes were noted. HEENT: Round and reactive pupils. Moist mucous membranes. No ulcerations or thrush. Neck: Supple to movements. Chest: No use of accessory muscles to breathe. Symmetrical expansion. No wheezing, crackles or rhonchi. Cardiovascular: S1 and S2 are rhythmic and regular. Abdomen: Positive bowel sounds to auscultation. Genitourinary: Male, barriga yellow urine  Extremities: No clubbing, no cyanosis, ++edema.   Lines: peripheral  Barriga - yellow urine     Laboratory and Tests Review:  Lab Results   Component Value Date    WBC 9.2 2021    WBC 10.0 2021    WBC 20.3 (H) 04/19/2021    HGB 10.1 (L) 04/22/2021    HCT 32.4 (L) 04/22/2021    MCV 91.8 04/22/2021     04/22/2021     Lab Results   Component Value Date    NEUTROABS 5.72 04/22/2021    NEUTROABS 7.42 (H) 04/20/2021    NEUTROABS 17.53 (H) 04/19/2021     No results found for: CRPHS  Lab Results   Component Value Date    ALT 25 04/22/2021    AST 27 04/22/2021    ALKPHOS 62 04/22/2021    BILITOT 0.8 04/22/2021     Lab Results   Component Value Date     04/22/2021    K 4.1 04/22/2021    K 3.7 04/16/2021     04/22/2021    CO2 26 04/22/2021    BUN 13 04/22/2021    CREATININE 1.1 04/22/2021    CREATININE 0.9 04/20/2021    CREATININE 0.9 04/18/2021    GFRAA >60 04/22/2021    LABGLOM >60 04/22/2021    GLUCOSE 94 04/22/2021    GLUCOSE 90 03/28/2012    PROT 6.0 04/22/2021    LABALBU 3.6 04/22/2021    CALCIUM 8.9 04/22/2021    BILITOT 0.8 04/22/2021    ALKPHOS 62 04/22/2021    AST 27 04/22/2021    ALT 25 04/22/2021     No results found for: CRP  No results found for: [address]  Radiology:      Microbiology:   Lab Results   Component Value Date    [facility]  04/18/2021     This isolate is often of questionable clinical significance. Validated  susceptibility testing methods do not exist for this isolate. This organism was isolated in one set. Susceptibility testing is not routinely done as this  organism frequently represents skin contamination.       ORG Aerococcus urinae 04/18/2021    ORG Aerococcus urinae 04/18/2021     Lab Results   Component Value Date    BLOODCULT2 5 Days no growth 04/18/2021    ORG Aerococcus urinae 04/18/2021    ORG Aerococcus urinae 04/18/2021     Urine Culture, Routine   Date Value Ref Range Status   04/18/2021 <10,000 CFU/mL  Gram positive organism   (A)  Final   04/18/2021 >100,000 CFU/ml  Final     MRSA Culture Only   Date Value Ref Range Status   06/22/2016 Methicillin resistant Staph aureus not isolated  Final       ASSESSMENT:  · UTI with bacteremia Aerococcus associated with Aerococcus and urine manipulation  · Urinary retention - has barriga     PLAN:  · Continue with Rocephin over the weekend then switch to HERZOG KATIE for discharge  · Check final cultures  · Monitor labs  · Repeat blood culture-no growth   · Urology following   · No fevers- patient reports he is for possible discharge at the end of the week. · Discussed with wife who was present in the room    Betty Kruse  1:18 PM  4/25/2021   Pt seen and examined. Above discussed agree with advanced practice nurse. Labs, cultures, and radiographs reviewed. Face to Face encounter occurred. Changes made as necessary.      Marcel Casanova MD

## 2021-04-25 NOTE — PROGRESS NOTES
Occupational Therapy  OCCUPATIONAL THERAPY DAILY NOTE    Date:2021  Patient Name: Song Lindsey  MRN: [id number]  : 1950  Room: [address] [place] [address]A     Diagnosis: CVA- RMCA  Precautions: Fall Risk & min [place]    Functional Assessment:   Date Status AE  Comments   Feeding 21 s/u     Grooming 21 S/u   To wash face, brush hair, and  seated at sink         Oral Care 21 S/u  brush teeth seated   Bathing 21 Ub: set up  LB: Min A Shower bench  Assist required for posterior pramod area while standing using grab bar   UB Dressing 21 S/u  Seated on shower bench   LB Dressing 21 Min A  To don brief and shorts seated on shower bench and standing to pull over hips. Assist required to thread left foot into brief. Footwear 21 Mod/max A    SBA     To doff socks and don crocs seated    Toileting 21 Min A     Homemaking 21 CGA rollator      Functional Transfers / Balance:   Date Status DME  Comments   Sit Balance 21 Ind     Stand Balance 21 CGA     [] Tub  [x] Shower   Transfer 21 CGA Shower bench, w/w, grab bars    Commode   Transfer 21 CGA Standard toilet, w/w    Functional   Mobility 21 SBA W/w     Other:  EOB>WC   21   Min A       Functional Exercises / Activity    Sensory / Neuromuscular Re-Education:      Cognitive Skills:   Status Comments   Problem   Solving fair     Memory fair     Sequencing fair     Safety fair       Visual Perception:    Education:  Pt educated on hand placement and safety during functional transfers. Pt educated on safety during homemaking task.   [] Family teach completed on:    Pain Level: no c/o pain this date. Additional Notes:       Patient has made good  progress during treatment sessions toward set goals.  Therapy emphasis to obtain goals:Strengthening, Gait Training, Patient/Caregiver Education & Training, Home Management Training, ROM, Equipment Evaluation, Education, & procurement, Balance Training, Neuromuscular Re-education, Functional Mobility Training, Cognitive Reorientation, Positioning, Endurance Training, Safety Education & Training, Self-Care / ADL    [x] Continue with current OT Plan of care.   [] Prepare for Discharge     DISCHARGE RECOMMENDATIONS  Long term goals  Time Frame for Long term goals : 2-3 weeks to address above problem areas  Long term goal 1: Pt demo independent to eat all meals  Long term goal 2: Pt demo Mod I grooming seated  Long term goal 3: Pt demo SBA to bathe @ shower level both seated & standing using a long handled sponge  Long term goal 4: Pt demo I UUE & Mod I to don underwear & pants & min A to don socks & shoes  Long term goal 5: Pt demo Mod I commode trf with appropraite DME to ensure pt safety  Long term goals 6: Pt demo SBA walk in shower trf using approrpiate DME to ensure pt safety  Long term goal 7: Pt demo SBA walk in shower trf using appropriate DME to ensure pt safety  Long term goal 8: Pt dmeo G- endurance for a 20 minute functional activity  Long term goal 9: Pt demo improved [address] & strength BUE to improve pt ability to complete ADLs as evidenced by gains in the following areas: 9- hole peg test- L 1 minute & .5 sec & norm for pt age & gender is 21.4 sec & R hand 34.2 sec & norm for pt age & gender is 19.9 sec &  strength- L hand 40# & norm for tp age & gender is 65# & R hand 62# & norm for pt age & gender is 75#    Recommended DME:    Post Discharge Care:   []Home Independently  []Home with 24hr Care / Supervision []Home with Partial Supervision []Home with Home Health OT []Home with Out Pt OT []Other: ___   Comments:         Time in Time out Tx Time Breakdown  Variance:   First Session  0855 0900 [x] Individual Tx- 60  [] Concurrent Tx -    [] Co-Tx -   [] Group Tx -   [] Time Missed -     Second Session   [] Individual Tx-   [] Concurrent Tx -   [] Co-Tx -   [] Group Tx -   [] Time Missed -     Third Session    [] Individual Tx-   [] Concurrent Tx -  [] Co-Tx -   [] Group Tx -   [] Time Missed -         Total Tx Time  60 Mins      Nadia Miller  QUINONES/L [id number]  I have read the above note and agree with the documentation.   Pablo Oropeza OTR/L [id number]

## 2021-04-26 PROCEDURE — 97535 SELF CARE MNGMENT TRAINING: CPT

## 2021-04-26 PROCEDURE — 97130 THER IVNTJ EA ADDL 15 MIN: CPT

## 2021-04-26 PROCEDURE — 97129 THER IVNTJ 1ST 15 MIN: CPT

## 2021-04-26 PROCEDURE — 6370000000 HC RX 637 (ALT 250 FOR IP): Performed by: INTERNAL MEDICINE

## 2021-04-26 PROCEDURE — 6360000002 HC RX W HCPCS: Performed by: INTERNAL MEDICINE

## 2021-04-26 PROCEDURE — 6370000000 HC RX 637 (ALT 250 FOR IP): Performed by: SPECIALIST

## 2021-04-26 PROCEDURE — 1280000000 HC REHAB R&B

## 2021-04-26 PROCEDURE — 97110 THERAPEUTIC EXERCISES: CPT

## 2021-04-26 PROCEDURE — 97530 THERAPEUTIC ACTIVITIES: CPT

## 2021-04-26 RX ORDER — CEFDINIR 300 MG/1
300 CAPSULE ORAL EVERY 12 HOURS SCHEDULED
Status: DISCONTINUED | OUTPATIENT
Start: 2021-04-26 | End: 2021-04-30 | Stop reason: HOSPADM

## 2021-04-26 RX ADMIN — CLOPIDOGREL 75 MG: 75 TABLET, FILM COATED ORAL at 08:56

## 2021-04-26 RX ADMIN — BACLOFEN 10 MG: 10 TABLET ORAL at 20:27

## 2021-04-26 RX ADMIN — LOSARTAN POTASSIUM 50 MG: 50 TABLET, FILM COATED ORAL at 08:56

## 2021-04-26 RX ADMIN — ENOXAPARIN SODIUM 40 MG: 40 INJECTION SUBCUTANEOUS at 08:56

## 2021-04-26 RX ADMIN — ATORVASTATIN CALCIUM 80 MG: 80 TABLET, FILM COATED ORAL at 10:29

## 2021-04-26 RX ADMIN — BACLOFEN 10 MG: 10 TABLET ORAL at 13:37

## 2021-04-26 RX ADMIN — BACLOFEN 10 MG: 10 TABLET ORAL at 08:56

## 2021-04-26 RX ADMIN — METOPROLOL TARTRATE 50 MG: 50 TABLET, FILM COATED ORAL at 20:27

## 2021-04-26 RX ADMIN — FLUTICASONE PROPIONATE 2 SPRAY: 50 SPRAY, METERED NASAL at 10:28

## 2021-04-26 RX ADMIN — TAMSULOSIN HYDROCHLORIDE 0.4 MG: 0.4 CAPSULE ORAL at 20:27

## 2021-04-26 RX ADMIN — ASPIRIN 81 MG CHEWABLE TABLET 81 MG: 81 TABLET CHEWABLE at 08:55

## 2021-04-26 RX ADMIN — CEFDINIR 300 MG: 300 CAPSULE ORAL at 20:27

## 2021-04-26 RX ADMIN — METOPROLOL TARTRATE 50 MG: 50 TABLET, FILM COATED ORAL at 08:54

## 2021-04-26 RX ADMIN — FERROUS SULFATE TAB 325 MG (65 MG ELEMENTAL FE) 325 MG: 325 (65 FE) TAB at 07:34

## 2021-04-26 RX ADMIN — ACETAMINOPHEN 650 MG: 325 TABLET ORAL at 20:27

## 2021-04-26 ASSESSMENT — PAIN DESCRIPTION - LOCATION: LOCATION: LEG

## 2021-04-26 ASSESSMENT — PAIN SCALES - GENERAL: PAINLEVEL_OUTOF10: 3

## 2021-04-26 ASSESSMENT — PAIN DESCRIPTION - PAIN TYPE
TYPE: CHRONIC PAIN
TYPE: CHRONIC PAIN

## 2021-04-26 ASSESSMENT — PAIN DESCRIPTION - ORIENTATION: ORIENTATION: LEFT

## 2021-04-26 ASSESSMENT — PAIN - FUNCTIONAL ASSESSMENT: PAIN_FUNCTIONAL_ASSESSMENT: ACTIVITIES ARE NOT PREVENTED

## 2021-04-26 ASSESSMENT — PAIN DESCRIPTION - DESCRIPTORS: DESCRIPTORS: ACHING

## 2021-04-26 ASSESSMENT — PAIN DESCRIPTION - FREQUENCY: FREQUENCY: INTERMITTENT

## 2021-04-26 ASSESSMENT — PAIN DESCRIPTION - ONSET: ONSET: GRADUAL

## 2021-04-26 NOTE — PROGRESS NOTES
Spoke with [facility], patient had a script for Albuterol solution 0.083 every 6 hours and a script from 1/22/21 for Trilogy 1 puff daily prescribed per Dr. Jazmine Chavez.

## 2021-04-26 NOTE — PROGRESS NOTES
04/26/21 1244   Attendance   Activity   ([employer])   Participation Active participation   [place] Demonstrates ability to complete social goals   Leisure Education Demonstrates knowledge of benefits of leisure involvement  Price Narvaez identified interesting & used my fingers as benefits .)   Leisure Attitude/Participation Participates in 1:1 structured activity   Time Spent With Patient   Minutes 39

## 2021-04-26 NOTE — PROGRESS NOTES
Occupational Therapy  OCCUPATIONAL THERAPY DAILY NOTE    Date:2021  Patient Name: Lea Gomez  MRN: [id number]  : 1950  Room: [address] [place], [place] [address]     Diagnosis: CVA- RMCA  Precautions: Fall Risk & min [place]    Functional Assessment:   Date Status AE  Comments   Feeding 21 s/u     Grooming 21 S/u            Oral Care 21 S/u     Bathing 21 Ub: set up  LB: Min A Shower bench     UB Dressing 21 S/u     LB Dressing 21 Min A     Footwear 21 Mod A    SBA     To doff socks and don crocs seated Donned L compression sock seated in standard chair with arms   Toileting 21 Min A     Homemaking 21 CGA rollator      Functional Transfers / Balance:   Date Status DME  Comments   Sit Balance 21 Ind     Stand Balance 21 CGA     [] Tub  [x] Shower   Transfer 21 CGA Shower bench, w/w, grab bars    Commode   Transfer 21 CGA Standard toilet, w/w    Functional   Mobility 21 SBA W/w     Other:  EOB>WC   21   Min A       Functional Exercises / Activity  Mechanical task with focus on B hand coordination and [facility] for independence with ADLs. Pt completes with good skill demonstrating STM, B hand coordination and [facility] with 1# wrist weights to increase endurance. Sensory / Neuromuscular Re-Education:      Cognitive Skills:   Status Comments   Problem   Solving fair     Memory fair     Sequencing fair     Safety fair       Visual Perception:    Education:  Pt educated on hand placement and safety during functional transfers. Pt educated on safety during homemaking task. [x] Family teach completed on: Pt's wife present during family teach this PM and observes pt donning compression sock on L foot and donning/doffing backless shoe. She communicates pt needs time to process instructions and wash showering on his own, getting dressed and completing transfers without assistance.  She stated that requiring time to think about things before he does them is how he was prior to his strokes. She declined seeing him transfer to the commode and any other transfers. She did see him transfer from [facility] chair with arms. Pt's wife also states that they have an extended tub bench at home. Pain Level: no c/o pain this date. Additional Notes:       Patient has made good  progress during treatment sessions toward set goals. Therapy emphasis to obtain goals:Strengthening, Gait Training, Patient/Caregiver Education & Training, Home Management Training, ROM, Equipment Evaluation, Education, & procurement, Balance Training, Neuromuscular Re-education, Functional Mobility Training, Cognitive Reorientation, Positioning, Endurance Training, Safety Education & Training, Self-Care / ADL    [x] Continue with current OT Plan of care.   [] Prepare for Discharge     DISCHARGE RECOMMENDATIONS  Long term goals  Time Frame for Long term goals : 2-3 weeks to address above problem areas  Long term goal 1: Pt demo independent to eat all meals  Long term goal 2: Pt demo Mod I grooming seated  Long term goal 3: Pt demo SBA to bathe @ shower level both seated & standing using a long handled sponge  Long term goal 4: Pt demo I UUE & Mod I to don underwear & pants & min A to don socks & shoes  Long term goal 5: Pt demo Mod I commode trf with appropraite DME to ensure pt safety  Long term goals 6: Pt demo SBA walk in shower trf using approrpiate DME to ensure pt safety  Long term goal 7: Pt demo SBA walk in shower trf using appropriate DME to ensure pt safety  Long term goal 8: Pt dmeo G- endurance for a 20 minute functional activity  Long term goal 9: Pt demo improved [address] & strength BUE to improve pt ability to complete ADLs as evidenced by gains in the following areas: 9- hole peg test- L 1 minute & .5 sec & norm for pt age & gender is 21.4 sec & R hand 34.2 sec & norm for pt age & gender is 19.9 sec &  strength- L hand 40# & norm for tp age & gender is 65# & R hand 62# & norm for pt age & gender is 75#    Recommended DME:    Post Discharge Care:   []Home Independently  []Home with 24hr Care / Supervision []Home with Partial Supervision []Home with Home Health OT []Home with Out Pt OT []Other: ___   Comments:         Time in Time out Tx Time Breakdown  Variance:   First Session  10:00 10:45 [] Individual Tx-   [x] Concurrent Tx -  45 Mins  [] Co-Tx -   [] Group Tx -   [] Time Missed -     Second Session 1:45 2:30 [x] Individual Tx-  45 Mins  [] Concurrent Tx -   [] Co-Tx -   [] Group Tx -   [] Time Missed -     Third Session    [] Individual Tx-   [] Concurrent Tx -  [] Co-Tx -   [] Group Tx -   [] Time Missed -         Total Tx Time 90 Mins      Juan Verma  QUINONES/L [id number]    I have read & agree with the above status    Ananda Mora OTR/L [id number]

## 2021-04-26 NOTE — PROGRESS NOTES
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 Pharyngeal stage appears functional.                            DIET RECOMMENDATIONS:   Recommend advance to regular consistency solids. Continue thin liquids.                 FEEDING RECOMMENDATIONS:                           Assistance level:  No assistance needed.                              Compensatory strategies recommended: Small bites/sips     THERAPY RECOMMENDATIONS:                       SPEECH PATHOLOGY DIAGNOSIS:    Moderate STM deficit. Mild overall cognitive deficit. Mild dysarthria with left labiobuccal and lingual weakness.      THERAPY RECOMMENDATIONS:   Speech Pathology intervention is recommended 3-6 times per week for LOS or when goals are met with emphasis on the following:      Improve STM recall, sequencing and problem solving for increased functional independence with completion of ADLs/IADLs to a level commensurate with supervision.     Increase speech intelligibility by               -- improving strength/ROM of the facial and lingual musculature to facilitate and sustain accuracy of articulator placement.               -- articulation drill training to promote precision of phoneme production at the word and sentence levels (goal met given 90% accuracy of production with unknown context).                                                                                                                                            FIMS SCORES                            Swallowing                          Current Status               6--Modified Independent               Short Term Goal wife initially reported she did not feel patient needed to receive speech therapy. SLP explained focus on cognition (versus speech itself), as patient reported a decline in memory and overall \"thinking\" since his most recent stroke. SLP reviewed goals of therapy and progress made with patient and wife. All questions answered. Patient's wife stated she is \"leaving it up to the patient\" if he wants to continue speech therapy during ARU stay but emphasized she does not feel he needs it at discharge. Safety:  Fair-    SPEECH:     Mild dysarthria with left labiobuccal and lingual weakness. Patient feels his speech is at baseline. SP recommended after discharge: No -- wife feels patient is at baseline from previous CVAs ~3 and ~5 years ago. Patient reports worsening of deficits but does not feel he will continue to need speech therapy. Supervision recommended at discharge: 24 hour    Will continue SP intervention as per previously established POC.     Minute Tracking:    Individual therapy:     0 minutes  Concurrent therapy:    0 minutes  Group therapy:     0 minutes  Co-treatment therapy:   45 minutes    Total minutes for 4/26/2021: 45 minutes

## 2021-04-27 LAB — CULTURE, BLOOD 3: NORMAL

## 2021-04-27 PROCEDURE — 97535 SELF CARE MNGMENT TRAINING: CPT

## 2021-04-27 PROCEDURE — 6370000000 HC RX 637 (ALT 250 FOR IP): Performed by: INTERNAL MEDICINE

## 2021-04-27 PROCEDURE — 97130 THER IVNTJ EA ADDL 15 MIN: CPT

## 2021-04-27 PROCEDURE — 6360000002 HC RX W HCPCS: Performed by: INTERNAL MEDICINE

## 2021-04-27 PROCEDURE — 97530 THERAPEUTIC ACTIVITIES: CPT

## 2021-04-27 PROCEDURE — 97129 THER IVNTJ 1ST 15 MIN: CPT

## 2021-04-27 PROCEDURE — 6370000000 HC RX 637 (ALT 250 FOR IP): Performed by: SPECIALIST

## 2021-04-27 PROCEDURE — 1280000000 HC REHAB R&B

## 2021-04-27 PROCEDURE — 97110 THERAPEUTIC EXERCISES: CPT

## 2021-04-27 RX ADMIN — METOPROLOL TARTRATE 50 MG: 50 TABLET, FILM COATED ORAL at 08:34

## 2021-04-27 RX ADMIN — ATORVASTATIN CALCIUM 80 MG: 80 TABLET, FILM COATED ORAL at 08:34

## 2021-04-27 RX ADMIN — ASPIRIN 81 MG CHEWABLE TABLET 81 MG: 81 TABLET CHEWABLE at 08:34

## 2021-04-27 RX ADMIN — CEFDINIR 300 MG: 300 CAPSULE ORAL at 20:34

## 2021-04-27 RX ADMIN — LOSARTAN POTASSIUM 50 MG: 50 TABLET, FILM COATED ORAL at 08:34

## 2021-04-27 RX ADMIN — ACETAMINOPHEN 650 MG: 325 TABLET ORAL at 20:34

## 2021-04-27 RX ADMIN — CLOPIDOGREL 75 MG: 75 TABLET, FILM COATED ORAL at 08:34

## 2021-04-27 RX ADMIN — BACLOFEN 10 MG: 10 TABLET ORAL at 20:34

## 2021-04-27 RX ADMIN — BACLOFEN 10 MG: 10 TABLET ORAL at 14:45

## 2021-04-27 RX ADMIN — BACLOFEN 10 MG: 10 TABLET ORAL at 08:34

## 2021-04-27 RX ADMIN — ENOXAPARIN SODIUM 40 MG: 40 INJECTION SUBCUTANEOUS at 08:33

## 2021-04-27 RX ADMIN — METOPROLOL TARTRATE 50 MG: 50 TABLET, FILM COATED ORAL at 20:34

## 2021-04-27 RX ADMIN — FERROUS SULFATE TAB 325 MG (65 MG ELEMENTAL FE) 325 MG: 325 (65 FE) TAB at 08:34

## 2021-04-27 RX ADMIN — FLUTICASONE PROPIONATE 2 SPRAY: 50 SPRAY, METERED NASAL at 08:33

## 2021-04-27 RX ADMIN — TAMSULOSIN HYDROCHLORIDE 0.4 MG: 0.4 CAPSULE ORAL at 20:34

## 2021-04-27 RX ADMIN — ACETAMINOPHEN 650 MG: 325 TABLET ORAL at 08:34

## 2021-04-27 RX ADMIN — CEFDINIR 300 MG: 300 CAPSULE ORAL at 08:34

## 2021-04-27 ASSESSMENT — PAIN DESCRIPTION - ONSET: ONSET: GRADUAL

## 2021-04-27 ASSESSMENT — PAIN DESCRIPTION - FREQUENCY
FREQUENCY: INTERMITTENT
FREQUENCY: INTERMITTENT

## 2021-04-27 ASSESSMENT — PAIN - FUNCTIONAL ASSESSMENT
PAIN_FUNCTIONAL_ASSESSMENT: ACTIVITIES ARE NOT PREVENTED
PAIN_FUNCTIONAL_ASSESSMENT: ACTIVITIES ARE NOT PREVENTED

## 2021-04-27 ASSESSMENT — PAIN DESCRIPTION - DESCRIPTORS: DESCRIPTORS: ACHING;THROBBING

## 2021-04-27 ASSESSMENT — PAIN DESCRIPTION - PAIN TYPE: TYPE: CHRONIC PAIN

## 2021-04-27 NOTE — PROGRESS NOTES
6--Modified Independent                       Long Term Goal          6--Modified Independent              Receptive                          Current Status              6--Modified Independent                 Short Term Goal         6--Modified Independent                       Long Term Goal          6--Modified Independent              Expressive                          Current Status              6--Modified Independent               Short Term Goal         6--Modified Independent                       Long Term Goal          6--Modified Independent              Social Interaction                          Current Status            5--Supervision              Short Term Goal         5--Supervision               Long Term Goal          5--Supervision                                                     Problem Solving                          Current Status             4--Minimal Assistance               Short Term Goal         5--Supervision               Long Term Goal          5--Supervision                  Memory                          Current Status             3--Moderate Assistance                        Short Term Goal         4--Minimal Assistance               Long Term Goal          5--Supervision                             SWALLOWING:      Diet:   Recommend advance to regular consistency solids. Continue thin liquids      Supervision is no longer required during mealtimes. LANGUAGE:     Benefits from increased time and occasional cues. Per patient, he has decreased auditory acuity and discrimination; reports issue as longstanding. COGNITION:       Fair+/good outcome with completion of recreationally based cognitive task. Demonstrated fair+/good recall of procedure and good sustained attention for duration of task. Fair+/good game strategy utilized throughout. Note from 4/26:   Family teach session completed with patient and patient's wife.  Patient's wife initially reported she did not feel patient needed to receive speech therapy. SLP explained focus on cognition (versus speech itself), as patient reported a decline in memory and overall \"thinking\" since his most recent stroke. SLP reviewed goals of therapy and progress made with patient and wife. All questions answered. Patient's wife stated she is \"leaving it up to the patient\" if he wants to continue speech therapy during ARU stay but emphasized she does not feel he needs it at discharge. Safety:  Fair-    SPEECH:     Mild dysarthria with left labiobuccal and lingual weakness. Patient feels his speech is at baseline. SP recommended after discharge: No -- wife feels patient is at baseline from previous CVAs ~3 and ~5 years ago. Patient reports worsening of deficits but does not feel he will continue to need speech therapy. Supervision recommended at discharge: 24 hour      Will continue SP intervention as per previously established POC.     Minute Tracking:    Individual therapy:     0 minutes  Concurrent therapy:    0 minutes  Group therapy:     0 minutes  Co-treatment therapy:   30 minutes    Total minutes for 4/27/2021: 30 minutes

## 2021-04-27 NOTE — PROGRESS NOTES
[address] [place] [address] [facility]  Progress Note    Chief complaint: Fever and urinary retention  Face to face encounter   SUBJECTIVE:  Patient is tolerating medications. No reported adverse drug reactions. No nausea, vomiting, diarrhea. Resting in bed. Watching TV  No fevers. Has barriga in place. Review of systems:  As stated above in the chief complaint, otherwise negative. Medications:  Scheduled Meds:   cefdinir  300 mg Oral 2 times per day    NONFORMULARY 1 puff  1 puff Inhalation Daily    losartan  50 mg Oral Daily    tamsulosin  0.4 mg Oral Nightly    ferrous sulfate  325 mg Oral Daily with breakfast    fluticasone  2 spray Each Nostril Daily    aspirin  81 mg Oral Daily    atorvastatin  80 mg Oral Daily    baclofen  10 mg Oral TID    clopidogrel  75 mg Oral Daily    enoxaparin  40 mg Subcutaneous Daily    metoprolol tartrate  50 mg Oral BID     Continuous Infusions:  PRN Meds:sodium chloride flush, bismuth subsalicylate, bisacodyl, acetaminophen, magnesium hydroxide    OBJECTIVE:  /74   Pulse 80   Temp 98.2 °F (36.8 °C) (Oral)   Resp 18   Ht 6' 1\" (1.854 m)   Wt 297 lb 11.2 oz (135 kg)   SpO2 94%   BMI 39.28 kg/m²   Temp  Av.2 °F (36.8 °C)  Min: 98.2 °F (36.8 °C)  Max: 98.2 °F (36.8 °C)  Constitutional: The patient is awake, alert, and oriented. No issues. Skin: Warm and dry. No rashes were noted. HEENT: Round and reactive pupils. Moist mucous membranes. No ulcerations or thrush. Neck: Supple to movements. Chest: No use of accessory muscles to breathe. Symmetrical expansion. No wheezing, crackles or rhonchi. Cardiovascular: S1 and S2 are rhythmic and regular. Abdomen: Positive bowel sounds to auscultation. Genitourinary: Male, barriga yellow urine  Extremities: No clubbing, no cyanosis, +edema.   Lines: peripheral  Barriga - yellow urine     Laboratory and Tests Review:  Lab Results   Component Value Date    WBC 9.2 2021    WBC 10.0 with Aerococcus and urine manipulation  · Urinary retention - has barriga     PLAN:  · Continue with Rocephin over the weekend then switch to HERZOG KATIE for discharge  · Check final cultures  · Monitor labs  · Repeat blood culture-no growth   · Urology following   · No fevers- patient reports he is for possible discharge at the end of the week. Karon Romeo  3:59 PM  4/27/2021     Pt seen and examined. Above discussed agree with advanced practice nurse. Labs, cultures, and radiographs reviewed. Face to Face encounter occurred. Changes made as necessary.      Darvin Simon MD

## 2021-04-27 NOTE — PROGRESS NOTES
Progress Note    Subjective/   66y.o. year old male on the rehab unit for stroke. He continues to feel better. Tolerating therapy. Warren remains in place. He is pleased with his progress. Objective/   VITALS:  BP (!) 140/76   Pulse 83   Temp 97.4 °F (36.3 °C) (Temporal)   Resp 18   Ht 6' 1\" (1.854 m)   Wt 297 lb 11.2 oz (135 kg)   SpO2 94%   BMI 39.28 kg/m²   24HR INTAKE/OUTPUT:      Intake/Output Summary (Last 24 hours) at 4/26/2021 2254  Last data filed at 4/26/2021 1951  Gross per 24 hour   Intake 1020 ml   Output 2350 ml   Net -1330 ml     Constitutional:  Alert, awake, no apparent distress   Cardiovascular:  S1, S2 without m/r/g   Respiratory:  CTA B without w/r/r   Abdomen: Positive bowel sounds  Ext: No pitting LE edema  Neuro: Awake and alert. Good sitting balance. Functional Level    Initial Evaluation  4/16/21 AM     PM    Short Term Goals Long Term Goals    Was pt agreeable to Eval/treatment? yes yes yes       Does pt have pain? L hip pain - hematoma from a fall at home L thigh pain L knee ache; L thigh/calf pain       Bed Mobility  Rolling: SBA  Supine to sit: SBA  Sit to supine: SBA  Scooting: SBA Supine<>sit: Supervision  Rolling: Supervision NT Supervision Independent   Transfers Sit to stand:  Mod A  Stand to sit: Mod A  Stand pivot: Min A with [employer] and LOL[employer] Brennan Sit to stand: Supervision  Stand to sit: Supervision  Stand pivot: SBA with [employer]; SBA with UpWalker Sit to stand: Supervision  Stand to sit: Supervision  Stand pivot: SBA with [employer]; SBA with UpWalker Min A Supervision   Ambulation   50 feet with UpWalker with Min A 75 feet x1 rep and 100 feet x1 rep with UpWalker SBA + WC follow for safety 75 feet with UpWalker SBA + WC follow for safety 150 feet with AAD with SBA >150 feet with AAD with Supervision   Walking 10 feet on uneven surface 10 feet with UpWalker with Mod A NT NT 10 feet with AAD with Min A 10 feet with AAD with Supervision   Wheel Chair Mobility 25 feet input(s): INR in the last 72 hours. Assessment/  Patient Active Problem List:     Cerebrovascular accident (CVA) due to occlusion of right middle cerebral artery ([address]     Coronary artery disease involving native coronary artery of native heart without angina pectoris     History of myocardial infarction     Essential hypertension     Mixed hyperlipidemia     Status post placement of implantable loop recorder     Acute ischemic stroke ([address]     History of CVA (cerebrovascular accident) without residual deficits     History of TIA (transient ischemic attack)     Acute CVA (cerebrovascular accident) ([address]     Ischemic cerebral vascular accident (CVA) due to stenosis of large extracranial artery ([address]     Red blood cell antibody positive     Ischemic stroke ([address]      Plan/  1. Trelegy added to regimen  2. Discussed with nursing to have patient advocate contact patients wife  3. ID note from 4/25/2021 reviewed   4. Continue secondary stroke prevention  5.  Continue DVT prophylaxis            Denise Posada MD

## 2021-04-27 NOTE — PROGRESS NOTES
Physical Therapy  Daily Treatment Note  Evaluating Therapist: Hernán Ko DPT    NAME: Rodney Gonzalez  ROOM: [id number]  DIAGNOSIS: Ischemic cerebral vascular accident -  R MCA  PRECAUTIONS: Falls, L hemiparesis, L inattention   PROCEDURE(S): 4/13 tPA, IR mechanical art intracranial thrombectomy    HPI: Presented on 4/13/21 with left sided weakness. He has history of prior stroke and is on asa and plavix as well as CAD, HLD, HTN, and MI. NIHSS 15. He was found to have right MCA M1 occlusion. Dr. Mia Schilder completed successful thrombectomy with TICI 3 reperfusion and received TPA. Social:  Pt lives with wife in a 1 story floor plan with 3 steps and 2 rails to enter. Pt uses a ramp and transport chair to enter/exit home. Prior to admission pt reports not doing much walking around the house and primarily used a transport chair to get around. Pt states that he uses a \"Upwalker\" to get into/out of his truck. Pt also owns a transport wheelchair, power wheelchair, and canes. Initial Evaluation  4/16/21 AM     PM    Short Term Goals Long Term Goals    Was pt agreeable to Eval/treatment? yes yes yes     Does pt have pain? L hip pain - hematoma from a fall at home L thigh pain L thigh pain     Bed Mobility  Rolling: SBA  Supine to sit: SBA  Sit to supine: SBA  Scooting: SBA Supine<>sit: Supervision  Rolling: Supervision NT Supervision Independent   Transfers Sit to stand:  Mod A  Stand to sit: Mod A  Stand pivot: Min A with [employer] and [employer] Sit to stand: Supervision  Stand to sit: Supervision  Stand pivot: Supervision with [employer]  Sit to stand: Supervision  Stand to sit: Supervision  Stand pivot: Supervision with [facility] Utilities A Supervision   Ambulation   50 feet with UpWalker with Min A 75 feet x2 reps with UpWalker SBA/Supervision 100 feet with UpWalker SBA/Supervision + WC follow for safety 150 feet with AAD with SBA >150 feet with AAD with Supervision   Walking 10 feet on uneven surface 10 feet with UpWalker with continue to use. Pt's L foot clearance diminishes as distance progresses. Pt's 10MWT score indicates that pt is below the cutoff for limited community ambulator and places the pt at an increased risk for falls. Pt continues to be limited by his B knee pain, general weakness, and debility. Will continue to progress pt as appropriate to continue to improve the pt's general function, strength, and endurance. Bed/chair alarm: armed after each session    Patient/family education  Pt/family educated on sit<>stand transfers, energy conservation    Patient/family response to education:   Pt/family verbalized understanding Pt/family demonstrated skill Pt/family requires further education in this area   yes With cues yes     AM   Time in: 1045  Time out: 1130  PM  Time in: 1300  Time out: 1345    Pt is making fair+ progress toward established Physical Therapy goals. Continue with physical therapy current plan of care.     EBONY BecerraT [id number]

## 2021-04-27 NOTE — PROGRESS NOTES
Occupational Therapy  OCCUPATIONAL THERAPY DAILY NOTE    Date:2021  Patient Name: Renu Weller  MRN: [id number]  : 1950  Room: [address] [place], [place] [address]     Diagnosis: CVA- RMCA  Precautions: Fall Risk & min [place]    Functional Assessment:   Date Status AE  Comments   Feeding 21 s/u     Grooming 21 S/u            Oral Care 21 S/u     Bathing 21 Ub: set up  LB: Min A Shower bench     UB Dressing 21 S/u     LB Dressing 21 Min A     Footwear 21 Mod A    SBA     To doff socks and don crocs seated    Toileting 21 Min A     Homemaking 21 CGA rollator      Functional Transfers / Balance:   Date Status DME  Comments   Sit Balance 21 Ind     Stand Balance 21 SBA upwalker    [] Tub  [x] Shower   Transfer 21 CGA Shower bench, w/w, grab bars Tub bench   Commode   Transfer 21 SBA Standard toilet, w/w    Functional   Mobility 21 SBA W/w     Other:  EOB>WC   21   Min A       Functional Exercises / Activity  Shoulder ladder with 3# weight 3 X 10 to increase BUE shoulder strength for independence with functional tasks. Arm Bike X 5 Mins forward to increase endurance and BUE strength/ROM for independence with functional tasks and mobility. Mod resistive rex bar X 25 reps on 4 planes to increase BUE forearm, wrist and  strength for independence with ADLs. Leisure task with focus on sequencing, STM recall and problem solving. Pt requires Min VCs  to recall instructions and problem solve. Sensory / Neuromuscular Re-Education:      Cognitive Skills:   Status Comments   Problem   Solving fair     Memory fair     Sequencing fair     Safety fair       Visual Perception:    Education:  Pt educated on hand placement and safety during functional transfers- fair carryover noted.      [x] Family teach completed on: Pt's wife present during family teach this PM and observes pt donning compression sock on L foot and donning/doffing backless areas: 9- hole peg test- L 1 minute & .5 sec & norm for pt age & gender is 21.4 sec & R hand 34.2 sec & norm for pt age & gender is 19.9 sec &  strength- L hand 40# & norm for tp age & gender is 65# & R hand 62# & norm for pt age & gender is 75#    Recommended DME:    Post Discharge Care:   []Home Independently  []Home with 24hr Care / Supervision []Home with Partial Supervision []Home with Home Health OT []Home with Out Pt OT []Other: ___   Comments:         Time in Time out Tx Time Breakdown  Variance:   First Session  10:00 10:45 [x] Individual Tx- 10  [x] Concurrent Tx -  35 Mins  [] Co-Tx -   [] Group Tx -   [] Time Missed -     Second Session 1:45 2:30 [x] Individual Tx-  45 Mins  [] Concurrent Tx -   [] Co-Tx -   [] Group Tx -   [] Time Missed -     Third Session    [] Individual Tx-   [] Concurrent Tx -  [] Co-Tx -   [] Group Tx -   [] Time Missed -         Total Tx Time 90 Mins      Cyrus Gonzalez  QUINONES/L [id number]  I have read the above note and agree with the documentation.   Pablo Oropeza OTR/L [id number]

## 2021-04-28 PROCEDURE — 6370000000 HC RX 637 (ALT 250 FOR IP): Performed by: SPECIALIST

## 2021-04-28 PROCEDURE — 6370000000 HC RX 637 (ALT 250 FOR IP): Performed by: INTERNAL MEDICINE

## 2021-04-28 PROCEDURE — 6360000002 HC RX W HCPCS: Performed by: INTERNAL MEDICINE

## 2021-04-28 PROCEDURE — 97110 THERAPEUTIC EXERCISES: CPT

## 2021-04-28 PROCEDURE — 97129 THER IVNTJ 1ST 15 MIN: CPT

## 2021-04-28 PROCEDURE — 97530 THERAPEUTIC ACTIVITIES: CPT

## 2021-04-28 PROCEDURE — 1280000000 HC REHAB R&B

## 2021-04-28 PROCEDURE — 97130 THER IVNTJ EA ADDL 15 MIN: CPT

## 2021-04-28 RX ADMIN — FLUTICASONE PROPIONATE 2 SPRAY: 50 SPRAY, METERED NASAL at 08:35

## 2021-04-28 RX ADMIN — ACETAMINOPHEN 650 MG: 325 TABLET ORAL at 20:31

## 2021-04-28 RX ADMIN — FERROUS SULFATE TAB 325 MG (65 MG ELEMENTAL FE) 325 MG: 325 (65 FE) TAB at 08:36

## 2021-04-28 RX ADMIN — BACLOFEN 10 MG: 10 TABLET ORAL at 20:31

## 2021-04-28 RX ADMIN — BACLOFEN 10 MG: 10 TABLET ORAL at 08:35

## 2021-04-28 RX ADMIN — METOPROLOL TARTRATE 50 MG: 50 TABLET, FILM COATED ORAL at 20:31

## 2021-04-28 RX ADMIN — METOPROLOL TARTRATE 50 MG: 50 TABLET, FILM COATED ORAL at 08:35

## 2021-04-28 RX ADMIN — ASPIRIN 81 MG CHEWABLE TABLET 81 MG: 81 TABLET CHEWABLE at 08:34

## 2021-04-28 RX ADMIN — TAMSULOSIN HYDROCHLORIDE 0.4 MG: 0.4 CAPSULE ORAL at 20:31

## 2021-04-28 RX ADMIN — CEFDINIR 300 MG: 300 CAPSULE ORAL at 20:31

## 2021-04-28 RX ADMIN — ACETAMINOPHEN 650 MG: 325 TABLET ORAL at 09:18

## 2021-04-28 RX ADMIN — ATORVASTATIN CALCIUM 80 MG: 80 TABLET, FILM COATED ORAL at 08:36

## 2021-04-28 RX ADMIN — BACLOFEN 10 MG: 10 TABLET ORAL at 13:54

## 2021-04-28 RX ADMIN — CEFDINIR 300 MG: 300 CAPSULE ORAL at 08:34

## 2021-04-28 RX ADMIN — ENOXAPARIN SODIUM 40 MG: 40 INJECTION SUBCUTANEOUS at 08:37

## 2021-04-28 RX ADMIN — CLOPIDOGREL 75 MG: 75 TABLET, FILM COATED ORAL at 08:33

## 2021-04-28 RX ADMIN — LOSARTAN POTASSIUM 50 MG: 50 TABLET, FILM COATED ORAL at 08:34

## 2021-04-28 ASSESSMENT — PAIN DESCRIPTION - LOCATION
LOCATION: LEG
LOCATION: LEG

## 2021-04-28 ASSESSMENT — PAIN SCALES - GENERAL: PAINLEVEL_OUTOF10: 4

## 2021-04-28 ASSESSMENT — PAIN DESCRIPTION - FREQUENCY: FREQUENCY: INTERMITTENT

## 2021-04-28 ASSESSMENT — PAIN DESCRIPTION - DESCRIPTORS: DESCRIPTORS: ACHING

## 2021-04-28 ASSESSMENT — PAIN - FUNCTIONAL ASSESSMENT: PAIN_FUNCTIONAL_ASSESSMENT: ACTIVITIES ARE NOT PREVENTED

## 2021-04-28 ASSESSMENT — PAIN DESCRIPTION - PAIN TYPE: TYPE: CHRONIC PAIN

## 2021-04-28 NOTE — PROGRESS NOTES
[address] [place] [address] [facility]  Progress Note    Chief complaint: Fever and urinary retention  Face to face encounter   SUBJECTIVE:  Patient is tolerating medications. No reported adverse drug reactions. No nausea, vomiting, diarrhea. Resting in bed. Watching TV  No fevers. Has barriga in place. Review of systems:  As stated above in the chief complaint, otherwise negative. Medications:  Scheduled Meds:   fluticasone-umeclidin-vilant  1 puff Inhalation Daily    cefdinir  300 mg Oral 2 times per day    losartan  50 mg Oral Daily    tamsulosin  0.4 mg Oral Nightly    ferrous sulfate  325 mg Oral Daily with breakfast    fluticasone  2 spray Each Nostril Daily    aspirin  81 mg Oral Daily    atorvastatin  80 mg Oral Daily    baclofen  10 mg Oral TID    clopidogrel  75 mg Oral Daily    enoxaparin  40 mg Subcutaneous Daily    metoprolol tartrate  50 mg Oral BID     Continuous Infusions:  PRN Meds:sodium chloride flush, bismuth subsalicylate, bisacodyl, acetaminophen, magnesium hydroxide    OBJECTIVE:  /70   Pulse 91   Temp 98.2 °F (36.8 °C)   Resp 18   Ht 6' 1\" (1.854 m)   Wt 297 lb 11.2 oz (135 kg)   SpO2 96%   BMI 39.28 kg/m²   Temp  Av.2 °F (36.8 °C)  Min: 98.2 °F (36.8 °C)  Max: 98.2 °F (36.8 °C)  Constitutional: The patient is awake, alert, and oriented. No issues. Skin: Warm and dry. No rashes were noted. HEENT: Round and reactive pupils. Moist mucous membranes. No ulcerations or thrush. Neck: Supple to movements. Chest: No use of accessory muscles to breathe. Symmetrical expansion. No wheezing, crackles or rhonchi. Cardiovascular: S1 and S2 are rhythmic and regular. Abdomen: Positive bowel sounds to auscultation. Genitourinary: Male, barriga yellow urine  Extremities: No clubbing, no cyanosis, +edema.   Lines: peripheral  Barriga - yellow urine     Laboratory and Tests Review:  Lab Results   Component Value Date    WBC 9.2 2021    WBC 10.0 04/20/2021    WBC 20.3 (H) 04/19/2021    HGB 10.1 (L) 04/22/2021    HCT 32.4 (L) 04/22/2021    MCV 91.8 04/22/2021     04/22/2021     Lab Results   Component Value Date    NEUTROABS 5.72 04/22/2021    NEUTROABS 7.42 (H) 04/20/2021    NEUTROABS 17.53 (H) 04/19/2021     No results found for: [facility]  Lab Results   Component Value Date    ALT 25 04/22/2021    AST 27 04/22/2021    ALKPHOS 62 04/22/2021    BILITOT 0.8 04/22/2021     Lab Results   Component Value Date     04/22/2021    K 4.1 04/22/2021    K 3.7 04/16/2021     04/22/2021    CO2 26 04/22/2021    BUN 13 04/22/2021    CREATININE 1.1 04/22/2021    CREATININE 0.9 04/20/2021    CREATININE 0.9 04/18/2021    GFRAA >60 04/22/2021    LABGLOM >60 04/22/2021    GLUCOSE 94 04/22/2021    GLUCOSE 90 03/28/2012    PROT 6.0 04/22/2021    LABALBU 3.6 04/22/2021    CALCIUM 8.9 04/22/2021    BILITOT 0.8 04/22/2021    ALKPHOS 62 04/22/2021    AST 27 04/22/2021    ALT 25 04/22/2021     No results found for: CRP  No results found for: [facility]  Radiology:      Microbiology:   Lab Results   Component Value Date    [facility]  04/18/2021     This isolate is often of questionable clinical significance. Validated  susceptibility testing methods do not exist for this isolate. This organism was isolated in one set. Susceptibility testing is not routinely done as this  organism frequently represents skin contamination.       ORG Aerococcus urinae 04/18/2021    ORG Aerococcus urinae 04/18/2021     Lab Results   Component Value Date    BLOODCULT2 5 Days no growth 04/18/2021    ORG Aerococcus urinae 04/18/2021    ORG Aerococcus urinae 04/18/2021     Urine Culture, Routine   Date Value Ref Range Status   04/18/2021 <10,000 CFU/mL  Gram positive organism   (A)  Final   04/18/2021 >100,000 CFU/ml  Final     [facility] Culture Only   Date Value Ref Range Status   06/22/2016 Methicillin resistant Staph aureus not isolated  Final       ASSESSMENT:  · UTI with bacteremia Aerococcus associated

## 2021-04-28 NOTE — PROGRESS NOTES
ALKPHOS in the last 72 hours. BNP: No results for input(s): BNP in the last 72 hours. Lipids: No results for input(s): CHOL, HDL in the last 72 hours. Invalid input(s): LDLCALCU  INR: No results for input(s): INR in the last 72 hours. Assessment/  Patient Active Problem List:     Cerebrovascular accident (CVA) due to occlusion of right middle cerebral artery ([address]     Coronary artery disease involving native coronary artery of native heart without angina pectoris     History of myocardial infarction     Essential hypertension     Mixed hyperlipidemia     Status post placement of implantable loop recorder     Acute ischemic stroke [facility]     History of CVA (cerebrovascular accident) without residual deficits     History of TIA (transient ischemic attack)     Acute CVA (cerebrovascular accident) ([address]     Ischemic cerebral vascular accident (CVA) due to stenosis of large extracranial artery ([address]     Red blood cell antibody positive     Ischemic stroke ([facility]      Plan/  1. Trial of heating pad to (L) LE  2. Continue other meds without change  3. Continue secondary stroke prevention  4. Continue blood pressure control  5. ID note appreciated.   We will switch to [address] for discharge          Arvin Posey MD

## 2021-04-28 NOTE — PROGRESS NOTES
Progress Note    Subjective/   66y.o. year old male on the rehab unit for stroke. No new complaints. Feeling good. Tolerating therapy program.  Moving his bowels. Understands he will be going home with the catheter. Objective/   VITALS:  BP (!) 142/72   Pulse 78   Temp 98.2 °F (36.8 °C) (Oral)   Resp 18   Ht 6' 1\" (1.854 m)   Wt 297 lb 11.2 oz (135 kg)   SpO2 94%   BMI 39.28 kg/m²   24HR INTAKE/OUTPUT:      Intake/Output Summary (Last 24 hours) at 4/27/2021 2214  Last data filed at 4/27/2021 1805  Gross per 24 hour   Intake 1440 ml   Output 625 ml   Net 815 ml     Constitutional:  Alert, awake, no apparent distress   Cardiovascular:  S1, S2 without m/r/g   Respiratory:  CTA B without w/r/r   Abdomen: +BS  : barriga draining clear yellow  Ext: no pitting LE edema  Neuro: awake and alert, good sitting balance    Functional Level      Date   Status   AE    Comments     Feeding   4/19/21   s/u             Grooming   4/25/21   S/u                    Oral Care   4/25/21   S/u             Bathing   4/25/21   Ub: set up    LB: Min A Shower bench          UB Dressing   4/25/21   S/u             LB Dressing   4/25/21   Min A             Footwear   4/25/21 4/25/21 Mod A       SBA         To doff socks and don crocs seated     Toileting   4/21/21   Min A             Homemaking   4/23/21   CGA   rollator              Functional Transfers / Balance:      Date Status DME  Comments   Sit Balance 4/27/21   Ind       Stand Balance 4/27/21   SBA upwalker     []? Tub  [x]?  Shower   Transfer 4/27/21 CGA Shower bench, w/w, grab bars Tub bench   Commode   Transfer 4/27/21   SBA Standard toilet, w/w     Functional   Mobility 4/27/21   SBA W/w      Other:  EOB>WC    4/22/21    Min A          Functional Exercises / Activity  Shoulder ladder with 3# weight 3 X 10 to increase BUE shoulder strength for independence with functional tasks.      Arm Bike X 5 Mins forward to increase endurance and BUE strength/ROM for independence with functional tasks and mobility.      Mod resistive rex bar X 25 reps on 4 planes to increase BUE forearm, wrist and  strength for independence with ADLs.    Leisure task with focus on sequencing, STM recall and problem solving. Pt requires Min VCs  to recall instructions and problem solve. Sensory / Neuromuscular Re-Education:        Cognitive Skills:    Status Comments   Problem   Solving fair      Memory fair      Sequencing fair      Safety fair             Scheduled Meds:   cefdinir  300 mg Oral 2 times per day    NONFORMULARY 1 puff  1 puff Inhalation Daily    losartan  50 mg Oral Daily    tamsulosin  0.4 mg Oral Nightly    ferrous sulfate  325 mg Oral Daily with breakfast    fluticasone  2 spray Each Nostril Daily    aspirin  81 mg Oral Daily    atorvastatin  80 mg Oral Daily    baclofen  10 mg Oral TID    clopidogrel  75 mg Oral Daily    enoxaparin  40 mg Subcutaneous Daily    metoprolol tartrate  50 mg Oral BID     Continuous Infusions:  PRN Meds:sodium chloride flush, bismuth subsalicylate, bisacodyl, acetaminophen, magnesium hydroxide  I/O last 3 completed shifts: In: 1200 [P.O.:1200]  Out: 500 [Urine:500]  I/O this shift: In: 720 [P.O.:720]  Out: [address] reviewed  CBC: No results for input(s): WBC, HGB, PLT in the last 72 hours. BMP:  No results for input(s): NA, K, CL, CO2, BUN, CREATININE, GLUCOSE in the last 72 hours. Hepatic: No results for input(s): AST, ALT, ALB, BILITOT, ALKPHOS in the last 72 hours. BNP: No results for input(s): BNP in the last 72 hours. Lipids: No results for input(s): CHOL, HDL in the last 72 hours. Invalid input(s): LDLCALCU  INR: No results for input(s): INR in the last 72 hours.     Assessment/  Patient Active Problem List:     Cerebrovascular accident (CVA) due to occlusion of right middle cerebral artery (HCC)     Coronary artery disease involving native coronary artery of native heart without angina pectoris     History of myocardial infarction     Essential hypertension     Mixed hyperlipidemia     Status post placement of implantable loop recorder     Acute ischemic stroke (HCC)     History of CVA (cerebrovascular accident) without residual deficits     History of TIA (transient ischemic attack)     Ac[facility]A (cerebrovascular accident) [facility]     Ischemic cerebral vascular accident (CVA) due to stenosis of large extracranial artery ([facility]     Red blood cell antibody positive     Ischem[facility] stroke [facility]      Plan/  1. Continue rehab program  2. Remains on oral antibiotics  3. Blood cultures negative to date  4. Continue other medications without change  5.  Plan for discharge at end of the week          West Beard MD

## 2021-04-28 NOTE — PROGRESS NOTES
DISCHARGE SUMMARY    Group interaction skills/socialization:  Geraldo Santos displayed social interaction skills at the supervision. Leisure participation/awareness:  Geraldo Santos participated in 1 therapeutic recreation interventions identifying 2 benefits to leisure participation.     Other:     Outcomes: goals partially achieved      Electronically signed by Hudson Mendez on 4/28/2021 at 1:18 PM

## 2021-04-28 NOTE — PLAN OF CARE
Oscar Antoine specifically speaking this am re his being discouraged re repeated MIs and CVAs and his desire to have no cpr, intubation, meds or other measures taken for resusc. States he has the needed papers at home but they are not here or in his soft chart or in epic for him at this time. We were able to get the printout w the needed signatures. Added copies of this for him to take home and they are in his blue DC folder. Please send home w him. Purple dnr wristband. periph iv removed.

## 2021-04-28 NOTE — PROGRESS NOTES
Occupational Therapy  OCCUPATIONAL THERAPY DAILY NOTE    Date:2021  Patient Name: Milagros Aguila  MRN: [id number]  : 1950  Room: [address] [place], [place] [address]A     Diagnosis: CVA- RMCA  Precautions: Fall Risk & min [place]    Functional Assessment:   Date Status AE  Comments   Feeding 21 s/u     Grooming 21 S/u            Oral Care 21 S/u     Bathing 21 Ub: set up  LB: Min A Shower bench     UB Dressing 21 S/u     LB Dressing 21 Min A     Footwear 21 Mod A    SBA     To doff socks and don crocs seated    Toileting 21 Min A     Homemaking 21 CGA rollator      Functional Transfers / Balance:   Date Status DME  Comments   Sit Balance 21 Ind     Stand Balance 21 SBA upwalker    [] Tub  [x] Shower   Transfer 21 CGA Shower bench, w/w, grab bars Tub bench   Commode   Transfer 21 SBA Standard toilet, w/w    Functional   Mobility 21 SBA W/w     Other:  EOB>WC   21   Min A       Functional Exercises / Activity  3# dowel pamela 3 X 10 on all planes to increase BUE strength and endurance for independence with functional tasks. Mod resistive rex bar X 25 reps on 4 planes to increase BUE forearm, wrist and  strength for independence with ADLs. Copy pattern pegboard activity with focus on sequencing and L hand [address]. Pt completed task with good skill and increased time to complete. WC pushups 2 X 5 reps to increase BUE strength for independence with functional tasks. 9 hole peg test as activity with focus on L hand FMC/Distal manipulation to increase independence with ADLs and IADLs. Sensory / Neuromuscular Re-Education:      Cognitive Skills:   Status Comments   Problem   Solving fair     Memory fair     Sequencing fair     Safety fair       Visual Perception:    Education:  Pt educated on safe technique during functional exercises.       [x] Family teach completed on: Pt's wife present during family teach this PM and observes pt donning compression sock on L foot and donning/doffing backless shoe. She communicates pt needs time to process instructions and wash showering on his own, getting dressed and completing transfers without assistance. She stated that requiring time to think about things before he does them is how he was prior to his strokes. She declined seeing him transfer to the commode and any other transfers. She did see him transfer from [facility] chair with arms. Pt's wife also states that they have an extended tub bench at home. Pain Level: no c/o pain this date. Additional Notes:       Patient has made good  progress during treatment sessions toward set goals. Therapy emphasis to obtain goals:Strengthening, Gait Training, Patient/Caregiver Education & Training, Home Management Training, ROM, Equipment Evaluation, Education, & procurement, Balance Training, Neuromuscular Re-education, Functional Mobility Training, Cognitive Reorientation, Positioning, Endurance Training, Safety Education & Training, Self-Care / ADL    [x] Continue with current OT Plan of care.   [] Prepare for Discharge     DISCHARGE RECOMMENDATIONS  Long term goals  Time Frame for Long term goals : 2-3 weeks to address above problem areas  Long term goal 1: Pt demo independent to eat all meals  Long term goal 2: Pt demo Mod I grooming seated  Long term goal 3: Pt demo SBA to bathe @ shower level both seated & standing using a long handled sponge  Long term goal 4: Pt demo I UUE & Mod I to don underwear & pants & min A to don socks & shoes  Long term goal 5: Pt demo Mod I commode trf with appropraite DME to ensure pt safety  Long term goals 6: Pt demo SBA walk in shower trf using approrpiate DME to ensure pt safety  Long term goal 7: Pt demo SBA walk in shower trf using appropriate DME to ensure pt safety  Long term goal 8: Pt dmeo G- endurance for a 20 minute functional activity  Long term goal 9: Pt demo improved [facility] & strength BUE to improve pt ability to complete ADLs as evidenced by gains in the following areas: 9- hole peg test- L 1 minute & .5 sec & norm for pt age & gender is 21.4 sec & R hand 34.2 sec & norm for pt age & gender is 19.9 sec &  strength- L hand 40# & norm for tp age & gender is 65# & R hand 62# & norm for pt age & gender is 75#    Recommended DME:    Post Discharge Care:   []Home Independently  []Home with 24hr Care / Supervision []Home with Partial Supervision []Home with Home Health OT []Home with Out Pt OT []Other: ___   Comments:         Time in Time out Tx Time Breakdown  Variance:   First Session  10:00 10:45 [x] Individual Tx- 10  [x] Concurrent Tx -  35 Mins  [] Co-Tx -   [] Group Tx -   [] Time Missed -     Second Session 1:45 2:30 [] Individual Tx-    [x] Concurrent Tx -45 Mins   [] Co-Tx -   [] Group Tx -   [] Time Missed -     Third Session    [] Individual Tx-   [] Concurrent Tx -  [] Co-Tx -   [] Group Tx -   [] Time Missed -         Total Tx Time 90 Mins      Merle Youngblood  QUINONES/L [id number]  I have read the above note and agree with the documentation.   Eagle Lomas OTR/L [id number]   I

## 2021-04-28 NOTE — PROGRESS NOTES
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 Pharyngeal stage appears functional.                            DIET RECOMMENDATIONS:   Recommend advance to regular consistency solids. Continue thin liquids.                 FEEDING RECOMMENDATIONS:                           Assistance level:  No assistance needed.                              Compensatory strategies recommended: Small bites/sips     THERAPY RECOMMENDATIONS:                       SPEECH PATHOLOGY DIAGNOSIS:    Moderate STM deficit. Mild overall cognitive deficit. Mild dysarthria with left labiobuccal and lingual weakness.      THERAPY RECOMMENDATIONS:   Speech Pathology intervention is recommended 3-6 times per week for LOS or when goals are met with emphasis on the following:      Improve STM recall, sequencing and problem solving for increased functional independence with completion of ADLs/IADLs to a level commensurate with supervision.     Increase speech intelligibility by               -- improving strength/ROM of the facial and lingual musculature to facilitate and sustain accuracy of articulator placement.               -- articulation drill training to promote precision of phoneme production at the word and sentence levels (goal met given 90% accuracy of production with unknown context).                                                                                                                                            FIMS SCORES                            Swallowing                          Current Status               6--Modified Independent               Short Term Goal to listen to brief, informative voicemails and then answer questions regarding the information presented. Each voicemail was ~2-3 sentences in length and presented at a natural speaking rate. SLP asked questions about information explicitly stated in the voicemail. Task completed with 80% accuracy given benefit of increased time and multiple choice format during questioning. Engaged patient in tabletop based cognitive task that required him to utilize a calendar to answer questions regarding his schedule/upcoming appointments. Completed with 100% accuracy given increased time. Patient was asked to listen to informative stories      Completed functional cognitive activity with focus on comprehension and memory. Patient was asked to listen to short stories and answer a series of questions pertaining to the information provided. Patient completed level 5/6 stories with 90% accuracy and level 6/6 stories with 60% accuracy. Note from 4/26:   Family teach session completed with patient and patient's wife. Patient's wife initially reported she did not feel patient needed to receive speech therapy. SLP explained focus on cognition (versus speech itself), as patient reported a decline in memory and overall \"thinking\" since his most recent stroke. SLP reviewed goals of therapy and progress made with patient and wife. All questions answered. Patient's wife stated she is \"leaving it up to the patient\" if he wants to continue speech therapy during ARU stay but emphasized she does not feel he needs it at discharge. Safety:  Fair-    SPEECH:     Mild dysarthria with left labiobuccal and lingual weakness. Patient feels his speech is at baseline. SP recommended after discharge: No -- wife feels patient is at baseline from previous CVAs ~3 and ~5 years ago. Patient reports worsening of deficits but does not feel he will continue to need speech therapy.   Supervision recommended at discharge: 24 hour      Will continue SP intervention as per previously established POC.     Minute Tracking:    Individual therapy:   45 minutes  Concurrent therapy:    0 minutes  Group therapy:     0 minutes  Co-treatment therapy:    0 minutes    Total minutes for 4/28/2021: 45 minutes

## 2021-04-28 NOTE — PROGRESS NOTES
Physical Therapy  Daily Treatment Note  Evaluating Therapist: William Valentine DPT    NAME: Jani Michael  ROOM: [id number]  DIAGNOSIS: Ischemic cerebral vascular accident -  R MCA  PRECAUTIONS: Falls, L hemiparesis, L inattention   PROCEDURE(S): 4/13 tPA, IR mechanical art intracranial thrombectomy    HPI: Presented on 4/13/21 with left sided weakness. He has history of prior stroke and is on asa and plavix as well as CAD, HLD, HTN, and MI. NIHSS 15. He was found to have right MCA M1 occlusion. Dr. Lali Patel completed successful thrombectomy with TICI 3 reperfusion and received TPA. Social:  Pt lives with wife in a 1 story floor plan with 3 steps and 2 rails to enter. Pt uses a ramp and transport chair to enter/exit home. Prior to admission pt reports not doing much walking around the house and primarily used a transport chair to get around. Pt states that he uses a \"Upwalker\" to get into/out of his truck. Pt also owns a transport wheelchair, power wheelchair, and canes. Initial Evaluation  4/16/21 AM     PM    Short Term Goals Long Term Goals    Was pt agreeable to Eval/treatment? yes yes yes     Does pt have pain? L hip pain - hematoma from a fall at home L thigh pain L thigh pain     Bed Mobility  Rolling: SBA  Supine to sit: SBA  Sit to supine: SBA  Scooting: SBA Supine<>sit: Supervision  Rolling: Supervision NT Supervision Independent   Transfers Sit to stand:  Mod A  Stand to sit: Mod A  Stand pivot: Min A with [address] [place] [address] and LOLI Brennan Sit to stand: Supervision  Stand to sit: Supervision  Stand pivot: Supervision with UpWalker  Sit to stand: Supervision  Stand to sit: Supervision  Stand pivot: [facility] Supervision   Ambulation   50 feet with UpWalker with Min A 75 feet x2 reps with UpWalker SBA/Supervision 100 feet x2 reps and 75 feet x1 rep with UpWalker SBA/Supervision + WC follow for safety 150 feet with AAD with SBA >150 feet with AAD with Supervision   Walking 10 feet on uneven surface 10 feet with UpWalker with Mod A 10 feet with UpWalker SBA NT 10 feet with AAD with Min A 10 feet with AAD with Supervision   Wheel Chair Mobility 25 feet Max A NT NT 50 feet  feet Mod I   Car Transfers NT - deferred today due to pt's difficulty with STS from high chair CGA NT Min A Supervision   Stair negotiation: ascended and descended 1 step x2 reps with B rails with Min A - backwards descending 4 steps x2 sets with B rails SBA/Supervision - backwards descending  4 steps with B rails SBA/Supervision - backwards descending  4 steps with B rail with Min A 4 steps with B rail with Supervision   Curb Step:   ascended and descended NT NT NT     Picking up object off the floor Picked up a cone with Min A SBA with reacher NT Will  an object with SBA Will  an object with Supervision   BLE ROM WFL WFL      BLE Strength RLE 5/5  LLE 4-/5 RLE 5/5  LLE 4-/5      Balance  Sitting: Supervision  Standing: Min/Mod A Sitting: Supervision  Standing: SBA/Supervision with WW/UpWalker      Date Family Teach Completed TBA Wife on 4/26/21      Is additional Family Teaching Needed? Y or N Y N      Hindering Progress Weakness Weakness      PT recommended ELOS 2-3 weeks       Team's Discharge Plan        Therapist at Team Meeting          Therapeutic Exercise:   AM:   - Functional mobility as noted above in chart  - Sit<>stand 2x5  PM:   - Functional mobility as noted above in chart  - Sit<>stand x5  - Standing L hip flexion to 6 inch box x20    10 meter walk test on 4/27/21 - .35 m/s (household walker)    Additional Comments: Pt continues to report L thigh pain, B knee pain, and generalized weakness. Increased time required for pt to complete functional activities due to pain and debility. Pt ambulates with a forward flexed posture and is limited in his ambulation distance due to his L thigh and B knee pain.  Pt continues to limit the amount of activity he performs in PT stating that he is worried about doing too much and \"not being worth anything\" the next day. Pt's L foot clearance diminishes with increased distances. Pt has made progress in PT however it has been limited by the pt's weakness, debility, and leg pain. Will continue to progress pt per pt tolerance prior to discharge home. Pt's wife present this week for family training and is confident she can care for pt at his current functional level citing that she has done so for many years. Bed/chair alarm: armed after each session    Patient/family education  Pt/family educated on sit<>stand transfers, energy conservation    Patient/family response to education:   Pt/family verbalized understanding Pt/family demonstrated skill Pt/family requires further education in this area   yes With cues yes     AM   Time in: 1045  Time out: 1130  PM  Time in: 1300  Time out: 1345    Pt is making fair+ progress toward established Physical Therapy goals. Continue with physical therapy current plan of care.     Kiesha Amaya, DPT [id number]

## 2021-04-29 PROCEDURE — 97530 THERAPEUTIC ACTIVITIES: CPT

## 2021-04-29 PROCEDURE — 97129 THER IVNTJ 1ST 15 MIN: CPT

## 2021-04-29 PROCEDURE — 6370000000 HC RX 637 (ALT 250 FOR IP): Performed by: SPECIALIST

## 2021-04-29 PROCEDURE — 1280000000 HC REHAB R&B

## 2021-04-29 PROCEDURE — 97130 THER IVNTJ EA ADDL 15 MIN: CPT

## 2021-04-29 PROCEDURE — 6360000002 HC RX W HCPCS: Performed by: INTERNAL MEDICINE

## 2021-04-29 PROCEDURE — 6370000000 HC RX 637 (ALT 250 FOR IP): Performed by: INTERNAL MEDICINE

## 2021-04-29 PROCEDURE — 97535 SELF CARE MNGMENT TRAINING: CPT

## 2021-04-29 RX ADMIN — BACLOFEN 10 MG: 10 TABLET ORAL at 09:31

## 2021-04-29 RX ADMIN — CEFDINIR 300 MG: 300 CAPSULE ORAL at 21:35

## 2021-04-29 RX ADMIN — CLOPIDOGREL 75 MG: 75 TABLET, FILM COATED ORAL at 09:32

## 2021-04-29 RX ADMIN — ACETAMINOPHEN 650 MG: 325 TABLET ORAL at 12:58

## 2021-04-29 RX ADMIN — TAMSULOSIN HYDROCHLORIDE 0.4 MG: 0.4 CAPSULE ORAL at 21:37

## 2021-04-29 RX ADMIN — BACLOFEN 10 MG: 10 TABLET ORAL at 12:58

## 2021-04-29 RX ADMIN — BACLOFEN 10 MG: 10 TABLET ORAL at 21:34

## 2021-04-29 RX ADMIN — CEFDINIR 300 MG: 300 CAPSULE ORAL at 09:32

## 2021-04-29 RX ADMIN — FLUTICASONE PROPIONATE 2 SPRAY: 50 SPRAY, METERED NASAL at 09:33

## 2021-04-29 RX ADMIN — METOPROLOL TARTRATE 50 MG: 50 TABLET, FILM COATED ORAL at 21:35

## 2021-04-29 RX ADMIN — FERROUS SULFATE TAB 325 MG (65 MG ELEMENTAL FE) 325 MG: 325 (65 FE) TAB at 09:33

## 2021-04-29 RX ADMIN — ENOXAPARIN SODIUM 40 MG: 40 INJECTION SUBCUTANEOUS at 09:32

## 2021-04-29 RX ADMIN — LOSARTAN POTASSIUM 50 MG: 50 TABLET, FILM COATED ORAL at 09:33

## 2021-04-29 RX ADMIN — ATORVASTATIN CALCIUM 80 MG: 80 TABLET, FILM COATED ORAL at 09:31

## 2021-04-29 RX ADMIN — ASPIRIN 81 MG CHEWABLE TABLET 81 MG: 81 TABLET CHEWABLE at 09:31

## 2021-04-29 RX ADMIN — ACETAMINOPHEN 650 MG: 325 TABLET ORAL at 21:48

## 2021-04-29 RX ADMIN — METOPROLOL TARTRATE 50 MG: 50 TABLET, FILM COATED ORAL at 09:33

## 2021-04-29 ASSESSMENT — PAIN DESCRIPTION - LOCATION: LOCATION: LEG

## 2021-04-29 ASSESSMENT — PAIN SCALES - GENERAL
PAINLEVEL_OUTOF10: 4
PAINLEVEL_OUTOF10: 0
PAINLEVEL_OUTOF10: 4
PAINLEVEL_OUTOF10: 5

## 2021-04-29 NOTE — PROGRESS NOTES
Physical Therapy  Facility/Department: [address] REHAB  Daily Treatment Note  NAME: Song Lindsey  : 1950  MRN: [id number]    Date of Service: 2021    Evaluating Therapist: Donna Titus DPT     NAME: Song Lindsey  ROOM: [id number]  DIAGNOSIS: Ischemic cerebral vascular accident -  R MCA  PRECAUTIONS: Falls, L hemiparesis, L inattention   PROCEDURE(S):  tPA, IR mechanical art intracranial thrombectomy    HPI: Presented on 21 with left sided weakness. He has history of prior stroke and is on asa and plavix as well as CAD, HLD, HTN, and MI. NIHSS 15. He was found to have right MCA M1 occlusion. Dr. Bailey Sands completed successful thrombectomy with TICI 3 reperfusion and received TPA.     Social:  Pt lives with wife in a 1 story floor plan with 3 steps and 2 rails to enter. Pt uses a ramp and transport chair to enter/exit home. Prior to admission pt reports not doing much walking around the house and primarily used a transport chair to get around. Pt states that he uses a \"Upwalker\" to get into/out of his truck. Pt also owns a transport wheelchair, power wheelchair, and canes.                    Initial Evaluation  21 AM     PM    Short Term Goals Long Term Goals    Was pt agreeable to Eval/treatment? yes yes yes       Does pt have pain? L hip pain - hematoma from a fall at home 10 L thigh L thigh pain       Bed Mobility  Rolling: SBA  Supine to sit: SBA  Sit to supine: SBA  Scooting: SBA NT Rolling: SBA  Supine to sit: SBA  Sit to supine: NT  Scooting: SBA Supervision Independent   Transfers Sit to stand:  Mod A  Stand to sit: Mod A  Stand pivot: Min A with [facility] and Walker Sit to stand: Supervision  Stand to sit: Supervision  Stand pivot: CGA with UpWalker  Sit to stand: Supervision  Stand to sit: Supervision  Stand pivot: Supervision with [facility] A Supervision   Ambulation   50 feet with UpWalker with Min A 75 feet x2 reps with UpWalker  feet with UpWalker SBA    125 feet with UpWalker with CGA and w/c follow for safety 150 feet with AAD with SBA >150 feet with AAD with Supervision   Walking 10 feet on uneven surface 10 feet with UpWalker with Mod A NT NT 10 feet with AAD with Min A 10 feet with AAD with Supervision   Wheel Chair Mobility 25 feet Max A NT NT 50 feet  feet Mod I   Car Transfers NT - deferred today due to pt's difficulty with STS from high chair NT NT Min A Supervision   Stair negotiation: ascended and descended 1 step x2 reps with B rails with Min A - backwards descending 4 steps with B rails Min A- backwards descending  4 steps with B rails Min A SBA/Supervision - backwards descending  4 steps with B rail with Min A 4 steps with B rail with Supervision   Curb Step:   ascended and descended NT NT NT       Picking up object off the floor Picked up a cone with Min A NT NT Will  an object with SBA Will  an object with Supervision   BLE ROM WFL WFL         BLE Strength RLE 5/5  LLE 4-/5 RLE 5/5  LLE 4-/5         Balance  Sitting: Supervision  Standing: Min/Mod A Sitting: Supervision  Standing: SBA/Supervision with WW/UpWalker  Sitting: Supervision  Standing: SBA/Supervision with WW/UpWalker       Date Family Teach Completed TBA Wife on 4/26/21         Is additional Family Teaching Needed? Y or N Y N         Hindering Progress Weakness Weakness  Weakness, endurance       PT recommended ELOS 2-3 weeks           Team's Discharge Plan             Therapist at Team Meeting                Therapeutic Exercise:   AM:  Functional mobility training as noted above  Stand pivot training x4 to R and L with UpWalker    PM:  Functional mobility training as noted above  Increased distance with ambulation, emphasis on L foot clearance  Sit<>stand transfer x4 training from mat table (21\" height), 1x from (24\" height)-- emphasis on eccentric control    Patient education  Pt educated on safety with transfers and gait.  Improved L foot placement with all transfers and

## 2021-04-29 NOTE — PROGRESS NOTES
[address] [place] [address] [facility]  Progress Note    Chief complaint: Fever and urinary retention  Face to face encounter   SUBJECTIVE:  Patient is tolerating medications. No reported adverse drug reactions. No nausea, vomiting, diarrhea. Up in chair. Has barriga in place. Review of systems:  As stated above in the chief complaint, otherwise negative. Medications:  Scheduled Meds:   fluticasone-umeclidin-vilant  1 puff Inhalation Daily    cefdinir  300 mg Oral 2 times per day    losartan  50 mg Oral Daily    tamsulosin  0.4 mg Oral Nightly    ferrous sulfate  325 mg Oral Daily with breakfast    fluticasone  2 spray Each Nostril Daily    aspirin  81 mg Oral Daily    atorvastatin  80 mg Oral Daily    baclofen  10 mg Oral TID    clopidogrel  75 mg Oral Daily    enoxaparin  40 mg Subcutaneous Daily    metoprolol tartrate  50 mg Oral BID     Continuous Infusions:  PRN Meds:sodium chloride flush, bismuth subsalicylate, bisacodyl, acetaminophen, magnesium hydroxide    OBJECTIVE:  /60   Pulse 80   Temp 97.7 °F (36.5 °C) (Oral)   Resp 16   Ht 6' 1\" (1.854 m)   Wt 297 lb 11.2 oz (135 kg)   SpO2 96%   BMI 39.28 kg/m²   Temp  Av.7 °F (36.5 °C)  Min: 97.7 °F (36.5 °C)  Max: 97.7 °F (36.5 °C)  Constitutional: The patient is awake, alert, and oriented. No issues. Skin: Warm and dry. No rashes were noted. HEENT: Round and reactive pupils. Moist mucous membranes. No ulcerations or thrush. Neck: Supple to movements. Chest: No use of accessory muscles to breathe. Symmetrical expansion. No wheezing, crackles or rhonchi. Cardiovascular: S1 and S2 are rhythmic and regular. Abdomen: Positive bowel sounds to auscultation. Genitourinary: Male, barriga yellow urine  Extremities: No clubbing, no cyanosis, +edema.   Lines: peripheral  Barriga - yellow urine     Laboratory and Tests Review:  Lab Results   Component Value Date    WBC 9.2 2021    WBC 10.0 2021    WBC 20.3 (H) 04/19/2021    HGB 10.1 (L) 04/22/2021    HCT 32.4 (L) 04/22/2021    MCV 91.8 04/22/2021     04/22/2021     Lab Results   Component Value Date    NEUTROABS 5.72 04/22/2021    NEUTROABS 7.42 (H) 04/20/2021    NEUTROABS 17.53 (H) 04/19/2021     No results found for: CRPHS  Lab Results   Component Value Date    ALT 25 04/22/2021    AST 27 04/22/2021    ALKPHOS 62 04/22/2021    BILITOT 0.8 04/22/2021     Lab Results   Component Value Date     04/22/2021    K 4.1 04/22/2021    K 3.7 04/16/2021     04/22/2021    CO2 26 04/22/2021    BUN 13 04/22/2021    CREATININE 1.1 04/22/2021    CREATININE 0.9 04/20/2021    CREATININE 0.9 04/18/2021    GFRAA >60 04/22/2021    LABGLOM >60 04/22/2021    GLUCOSE 94 04/22/2021    GLUCOSE 90 03/28/2012    PROT 6.0 04/22/2021    LABALBU 3.6 04/22/2021    CALCIUM 8.9 04/22/2021    BILITOT 0.8 04/22/2021    ALKPHOS 62 04/22/2021    AST 27 04/22/2021    ALT 25 04/22/2021     No results found for: CRP  No results found for: [address]  Radiology:      Microbiology:   Lab Results   Component Value Date    [facility]  04/18/2021     This isolate is often of questionable clinical significance. Validated  susceptibility testing methods do not exist for this isolate. This organism was isolated in one set. Susceptibility testing is not routinely done as this  organism frequently represents skin contamination.       ORG Aerococcus urinae 04/18/2021    ORG Aerococcus urinae 04/18/2021     Lab Results   Component Value Date    BLOODCULT2 5 Days no growth 04/18/2021    ORG Aerococcus urinae 04/18/2021    ORG Aerococcus urinae 04/18/2021     Urine Culture, Routine   Date Value Ref Range Status   04/18/2021 <10,000 CFU/mL  Gram positive organism   (A)  Final   04/18/2021 >100,000 CFU/ml  Final     MRSA Culture Only   Date Value Ref Range Status   06/22/2016 Methicillin resistant Staph aureus not isolated  Final       ASSESSMENT:  · UTI with bacteremia Aerococcus associated with Aerococcus and urine manipulation  · Urinary retention - has barriga     PLAN:  · Continue with Rocephin over the weekend then switch to HERZOG KATIE for discharge day 10 of total therapy  · Check final cultures  · Monitor labs  · Repeat blood culture-no growth   · Urology following   · No fevers- patient reports he is for possible discharge at the end of the week. Maribell [place]  11:35 AM  4/29/2021   Pt seen and examined. Above discussed agree with advanced practice nurse. Labs, cultures, and radiographs reviewed. Face to Face encounter occurred. Changes made as necessary.      Alexander Novoa MD

## 2021-04-29 NOTE — PROGRESS NOTES
Occupational Therapy  OCCUPATIONAL THERAPY DAILY NOTE    Date:2021  Patient Name: Tone Escobar  MRN: [id number]  : 1950  Room: [address] [place], [place] [address]     Diagnosis: CVA- RMCA  Precautions: Fall Risk & min [place]    Functional Assessment:   Date Status AE  Comments   Feeding 21 Mod I      Grooming 21 Mod I   Washed face and hair seated in shower. Seated at sink pt shaves face and applies deodorant. Oral Care 21 Mod I     Bathing 21 S Shower bench  UB and LB bathing completed seated on shower bench. S for safety. UB Dressing 21 Mod I   Don/doff pullover shirt. LB Dressing 21 SBA  Don/doff underwear and shorts with SBA for safety. Footwear 21     S     To doff socks and don crocs seated Doff/don socks and don velcroe shoes. Toileting 21 Min A     Homemaking 21 CGA rollator      Functional Transfers / Balance:   Date Status DME  Comments   Sit Balance 21 Ind     Stand Balance 21 SBA upwalker    [] Tub  [x] Shower   Transfer 21 CGA Shower bench, w/w, grab bars Tub bench   Commode   Transfer 21 SBA Standard toilet, w/w    Functional   Mobility 21 SBA W/w     Other:  EOB>WC   21   Min A       Functional Exercises / Activity  Pipe tree design activity completed with focus on B hand coordination and problem solving. Pt completes with good skill. L FMC and strengthening activity retrieving pompoms from table top and placing into resistive container. Pt completes with fair+ skill. Safety cards with focus on insight and reasoning. Pt completes with fair+ insight and reasoning with min cuing for insight and to problem solve. Sensory / Neuromuscular Re-Education:      Cognitive Skills:   Status Comments   Problem   Solving fair     Memory fair     Sequencing fair     Safety fair       Visual Perception:    Education:  Pt educated on safety to lock brakes while completing LB dressing.      [x] Family teach completed on: Pt's wife present during family teach this PM and observes pt donning compression sock on L foot and donning/doffing backless shoe. She communicates pt needs time to process instructions and wash showering on his own, getting dressed and completing transfers without assistance. She stated that requiring time to think about things before he does them is how he was prior to his strokes. She declined seeing him transfer to the commode and any other transfers. She did see him transfer from [facility] with arms. Pt's wife also states that they have an extended tub bench at home. Pain Level: no c/o pain this date. Additional Notes:       Patient has made good  progress during treatment sessions toward set goals. Therapy emphasis to obtain goals:Strengthening, Gait Training, Patient/Caregiver Education & Training, Home Management Training, ROM, Equipment Evaluation, Education, & procurement, Balance Training, Neuromuscular Re-education, Functional Mobility Training, Cognitive Reorientation, Positioning, Endurance Training, Safety Education & Training, Self-Care / ADL    [x] Continue with current OT Plan of care.   [] Prepare for Discharge     DISCHARGE RECOMMENDATIONS  Long term goals  Time Frame for Long term goals : 2-3 weeks to address above problem areas  Long term goal 1: Pt demo independent to eat all meals  Long term goal 2: Pt demo Mod I grooming seated  Long term goal 3: Pt demo SBA to bathe @ shower level both seated & standing using a long handled sponge  Long term goal 4: Pt demo I UUE & Mod I to don underwear & pants & min A to don socks & shoes  Long term goal 5: Pt demo Mod I commode trf with appropraite DME to ensure pt safety  Long term goals 6: Pt demo SBA walk in shower trf using approrpiate DME to ensure pt safety  Long term goal 7: Pt demo SBA walk in shower trf using appropriate DME to ensure pt safety  Long term goal 8: Pt dmeo G- endurance for a 20 minute functional activity  Long term goal 9: Pt demo improved [address] & strength BUE to improve pt ability to complete ADLs as evidenced by gains in the following areas: 9- hole peg test- L 1 minute & .5 sec & norm for pt age & gender is 21.4 sec & R hand 34.2 sec & norm for pt age & gender is 19.9 sec &  strength- L hand 40# & norm for tp age & gender is 65# & R hand 62# & norm for pt age & gender is 75#    Recommended DME:    Post Discharge Care:   []Home Independently  []Home with 24hr Care / Supervision []Home with Partial Supervision []Home with Home Health OT []Home with Out Pt OT []Other: ___   Comments:         Time in Time out Tx Time Breakdown  Variance:   First Session  8:20 9:15 [x] Individual Tx- 40 Mins  [] Concurrent Tx -    [] Co-Tx -   [] Group Tx -   [] Time Missed -     Second Session 10:07 10:45 [x] Individual Tx-  38 Mins  [] Concurrent Tx -   [] Co-Tx -   [] Group Tx -   [] Time Missed -     Third Session    [] Individual Tx-   [] Concurrent Tx -  [] Co-Tx -   [] Group Tx -   [] Time Missed -         Total Tx Time 78 Mins      Daisy Rojo  QUINONES/L [id number]  I have read the above note and agree with the documentation.   Iggy Draper OTR/L [id number]

## 2021-04-29 NOTE — PROGRESS NOTES
Physical Therapy  Weekly Team Note    Evaluating Therapist: Clara Ontiveros DPT     NAME: Milagros Aguila  ROOM: [id number]  DIAGNOSIS: Ischemic cerebral vascular accident -  R MCA  PRECAUTIONS: Falls, L hemiparesis, L inattention   PROCEDURE(S): 4/13 tPA, IR mechanical art intracranial thrombectomy    HPI: Presented on 4/13/21 with left sided weakness. He has history of prior stroke and is on asa and plavix as well as CAD, HLD, HTN, and MI. NIHSS 15. He was found to have right MCA M1 occlusion. Dr. Rafaela Combs completed successful thrombectomy with TICI 3 reperfusion and received TPA.     Social:  Pt lives with wife in a 1 story floor plan with 3 steps and 2 rails to enter. Pt uses a ramp and transport chair to enter/exit home. Prior to admission pt reports not doing much walking around the house and primarily used a transport chair to get around. Pt states that he uses a \"Upwalker\" to get into/out of his truck. Pt also owns a transport wheelchair, power wheelchair, and canes.                     Initial Evaluation  4/16/21 4/22/21 4/29/21 Comments    Short Term Goals Long Term Goals    Bed Mobility  Rolling: SBA  Supine to sit: SBA  Sit to supine: SBA  Scooting: SBA Supine<>sit: Supervision  Rolling: Supervision  Supine<>sit: Supervision  Rolling: Supervision  Supervision Independent   Transfers Sit to stand:  Mod A  Stand to sit: Mod A  Stand pivot: Min A with [employer] and LOLI Brennan Sit to stand: Supervision  Stand to sit: Supervision  Stand pivot: SBA with [employer] Sit to stand: Supervision  Stand to sit: Supervision  Stand pivot: SBA<>CGA with UpWalker Continues to catch L foot with pivot transfer, lacking eccentric control with lower surfaces Min A Supervision   Ambulation   50 feet with UpWalker with Min A 50 feet x1 rep and 65 feet x1 rep with [employer] SBA/ feet with SBA with UpWalker Pt fatigues easily; LLE fatigues first with breakdown of mechanics and near buckling noted 150 feet with AAD with SBA >150 feet with AAD with Supervision   Wheel Chair Mobility 25 feet Max A 50 feet Supervision NT this week Emphasis on transfer training and gait 50 feet  feet Mod I   Car Transfers NT - deferred today due to pt's difficulty with STS from high chair Min A NT Pt reports he will be transported in  truck that is much higher than car in gym Min A Supervision   Stair negotiation: ascended and descended 1 step x2 reps with B rails with Min A - backwards descending 1 step x6 reps with B rails SBA/CGA - backwards descending 4 steps with B rails with CGA- descending backwards Increased assist with B LE fatigue vs knee pain 4 steps with B rail with Min A 4 steps with B rail with Supervision   BLE ROM WFL WFL WFL         BLE Strength RLE 5/5  LLE 4-/5 RLE 5/5  LLE 4-/5  WFL        Balance  Sitting: Supervision  Standing: Min/Mod A Sitting: Supervision  Standing: SBA/CGA with [employer] Sitting: Supervision  Standing: SBA/CGA with UpWalker         Date Family Teach Completed TBA TBA Wife, 4/26/21         Is additional Family Teaching Needed? Y or N Y Y N         Hindering Progress Weakness Weakness Weakness, Endurance         PT recommended ELOS 2-3 weeks 2 weeks         Team's Discharge Plan   Discharge Friday 4/30/21 Discharge today 4/30/21         Therapist at Team Meeting   Krys Mccormack PT, DPT [id number]    Krys Mccormack PT, DPT [id number]              Date:  4/22/21  Supporting factors:  Supportive wife  Barriers to discharge:  [facility]  Additional comments:  Pt has made some progress thus far but still has significant impairments especially with endurance. Pt has fair- carry over with cues and tends to do things his preferred way. Using the [employer] as the primary AD not the pt's UpWalker, and the pt was educated that he should use a [employer] as it is more functional. Recommend initiating family training ASAP so wife can weigh in on pt's PLOF since pt reports he did not do much walking in the first place.   DME:  TBD closer to discharge - unsure if pt has a regular [employer]  After Care:  Brannon Moore, DPT [id number]    Date:  4/29/21  Supporting factors:  Supportive wife  Barriers to discharge:  Weakness/debility, endurance  Additional comments:  Pt continues to progress ambulation distance with personal UpWalker per his preference. Pt reports he feels limitations are due to L LE pain in thigh/knee. Wife attended family training and reported she felt comfortable to assist pt at home as she has been able to assist at current level for many years.  Pt L foot catching with ambulation and turns increasing risk of falls  DME:  FWW if pt does not own  After Care:  Michelle Alvarado, PT, DPT  [id number]

## 2021-04-29 NOTE — PROGRESS NOTES
Speech Language Pathology  ACUTE REHABILITATION--DAILY PROGRESS NOTE            SUMMARY OF EVALUATION    ADMITTING DIAGNOSIS: Ischemic cerebral vascular accident (CVA) due to stenosis of large extracranial artery (HCC) [I63.20]  Ischemic stroke ([facility] [place] [facility] [I63.9]                 DYSPHAGIA DIAGNOSIS:  Mild oral stage dysphagia attributed to mild left labiobuccal and lingual weakness and resolving xerostomia. Pharyngeal stage appears functional.                            DIET RECOMMENDATIONS:   Recommend advance to regular consistency solids. Continue thin liquids.                 FEEDING RECOMMENDATIONS:                           Assistance level:  No assistance needed.                              Compensatory strategies recommended: Small bites/sips     THERAPY RECOMMENDATIONS:                       SPEECH PATHOLOGY DIAGNOSIS:    Moderate STM deficit. Mild overall cognitive deficit. Mild dysarthria with left labiobuccal and lingual weakness.      THERAPY RECOMMENDATIONS:   Speech Pathology intervention is recommended 3-6 times per week for LOS or when goals are met with emphasis on the following:      Improve STM recall, sequencing and problem solving for increased functional independence with completion of ADLs/IADLs to a level commensurate with supervision.     Increase speech intelligibility by               -- improving strength/ROM of the facial and lingual musculature to facilitate and sustain accuracy of articulator placement.               -- articulation drill training to promote precision of phoneme production at the word and sentence levels (goal met given 90% accuracy of production with unknown context).                                                                                                                                            FIMS SCORES                            Swallowing                          Current Status               6--Modified Independent               Short Term Goal 6--Modified Independent                       Long Term Goal          6--Modified Independent              Receptive                          Current Status              6--Modified Independent                 Short Term Goal         6--Modified Independent                       Long Term Goal          6--Modified Independent              Expressive                          Current Status              6--Modified Independent               Short Term Goal         6--Modified Independent                       Long Term Goal          6--Modified Independent              Social Interaction                          Current Status            5--Supervision              Short Term Goal         5--Supervision               Long Term Goal          5--Supervision                                                     Problem Solving                          Current Status            4--Minimal Assistance               Short Term Goal           4--Minimal Assistance            Long Term Goal            4--Minimal Assistance                 Memory                          Current Status               4--Minimal Assistance                      Short Term Goal           4--Minimal Assistance            Long Term Goal            4--Minimal Assistance                             SWALLOWING:      Diet:   Recommend advance to regular consistency solids. Continue thin liquids      Supervision is no longer required during mealtimes. LANGUAGE:     Benefits from increased time and occasional cues. Per patient, he has decreased auditory acuity and discrimination; reports issue as longstanding. COGNITION:       Good functional problem solving when given a 2D template and asked to build a 3D representation of the structured depicted with PVC tubing. He did not require prompts from SLP. Note from 4/26:   Family teach session completed with patient and patient's wife.  Patient's wife initially reported she did not feel patient needed to receive speech therapy. SLP explained focus on cognition (versus speech itself), as patient reported a decline in memory and overall \"thinking\" since his most recent stroke. SLP reviewed goals of therapy and progress made with patient and wife. All questions answered. Patient's wife stated she is \"leaving it up to the patient\" if he wants to continue speech therapy during ARU stay but emphasized she does not feel he needs it at discharge. Safety:  Fair-    SPEECH:     Mild dysarthria with left labiobuccal and lingual weakness. Patient feels his speech is at baseline. SP recommended after discharge: No -- wife feels patient is at baseline from previous CVAs ~3 and ~5 years ago. Patient does not feel he will continue to need speech therapy. Supervision recommended at discharge: 24 hour      Will continue SP intervention as per previously established POC.     Minute Tracking:    Individual therapy:     0 minutes  Concurrent therapy:  15 minutes  Group therapy:     0 minutes  Co-treatment therapy:   30 minutes    Total minutes for 4/29/2021: 45 minutes

## 2021-04-29 NOTE — PROGRESS NOTES
Comprehensive Nutrition Assessment    Type and Reason for Visit:  RD Nutrition Re-Screen/LOS    Nutrition Recommendations/Plan: Continue current diet    Nutrition Assessment:  Pt admit to ARU s/p CVA. Noted % meal intakes since admit. Will continue to monitor.     Malnutrition Assessment:  Malnutrition Status:  No malnutrition    Context:  Acute Illness     Findings of the 6 clinical characteristics of malnutrition:  Energy Intake:  No significant decrease in energy intake  Weight Loss:  No significant weight loss     Body Fat Loss:  No significant body fat loss     Muscle Mass Loss:  No significant muscle mass loss    Fluid Accumulation:  No significant fluid accumulation     Strength:  Not Performed    Estimated Daily Nutrient Needs:  Energy (kcal):  5607-6108; Weight Used for Energy Requirements:  Admission     Protein (g):  125-150; Weight Used for Protein Requirements:  Ideal(1.5-1.8)        Fluid (ml/day):  7964-3367; Method Used for Fluid Requirements:  1 ml/kcal      Nutrition Related Findings:  A&Ox3, active BS, soft abd, no edema, -I/Os      Wounds:  None       Current Nutrition Therapies:    DIET GENERAL; Carb Control: 3 carb choices (45 gms)/meal; Low Sodium (2 GM)    Anthropometric Measures:  · Height: 6' 1\" (185.4 cm)  · Current Body Weight: 270 lb (122.5 kg)(4/15 bed admit wt - # 4/25 elevated since admit)   · Admission Body Weight: 270 lb (122.5 kg)(4/15 bed)    · Usual Body Weight: 268 lb (121.6 kg)(4/13/21 bed scale)     · Ideal Body Weight: 184 lbs; % Ideal Body Weight 146.7 %   · BMI: 35.6  · BMI Categories: Obese Class 2 (BMI 35.0 -39.9)       Nutrition Diagnosis:   No nutrition diagnosis at this time     Nutrition Interventions:   Food and/or Nutrient Delivery:  Continue Current Diet  Nutrition Education/Counseling:  Education not indicated   Coordination of Nutrition Care:  Continue to monitor while inpatient    Goals:  pt to consume >75% meals/ONS       Nutrition

## 2021-04-30 VITALS
RESPIRATION RATE: 16 BRPM | HEART RATE: 95 BPM | WEIGHT: 297.7 LBS | HEIGHT: 73 IN | TEMPERATURE: 98.6 F | SYSTOLIC BLOOD PRESSURE: 113 MMHG | OXYGEN SATURATION: 97 % | BODY MASS INDEX: 39.45 KG/M2 | DIASTOLIC BLOOD PRESSURE: 74 MMHG

## 2021-04-30 PROCEDURE — 6370000000 HC RX 637 (ALT 250 FOR IP): Performed by: INTERNAL MEDICINE

## 2021-04-30 PROCEDURE — 6370000000 HC RX 637 (ALT 250 FOR IP): Performed by: SPECIALIST

## 2021-04-30 PROCEDURE — 6360000002 HC RX W HCPCS: Performed by: INTERNAL MEDICINE

## 2021-04-30 RX ORDER — FERROUS SULFATE 325(65) MG
325 TABLET ORAL
Qty: 30 TABLET | Refills: 3 | Status: SHIPPED | OUTPATIENT
Start: 2021-04-30

## 2021-04-30 RX ORDER — LOSARTAN POTASSIUM 50 MG/1
50 TABLET ORAL DAILY
Qty: 30 TABLET | Refills: 3 | Status: SHIPPED | OUTPATIENT
Start: 2021-04-30

## 2021-04-30 RX ORDER — FLUTICASONE FUROATE, UMECLIDINIUM BROMIDE AND VILANTEROL TRIFENATATE 100; 62.5; 25 UG/1; UG/1; UG/1
1 POWDER RESPIRATORY (INHALATION) DAILY
Qty: 1 EACH | Refills: 0
Start: 2021-04-30

## 2021-04-30 RX ORDER — FLUTICASONE PROPIONATE 50 MCG
2 SPRAY, SUSPENSION (ML) NASAL DAILY
Qty: 1 BOTTLE | Refills: 3 | Status: SHIPPED | OUTPATIENT
Start: 2021-04-30

## 2021-04-30 RX ORDER — TAMSULOSIN HYDROCHLORIDE 0.4 MG/1
0.4 CAPSULE ORAL NIGHTLY
Qty: 30 CAPSULE | Refills: 3 | Status: SHIPPED | OUTPATIENT
Start: 2021-04-30

## 2021-04-30 RX ORDER — CEFDINIR 300 MG/1
300 CAPSULE ORAL EVERY 12 HOURS SCHEDULED
Qty: 6 CAPSULE | Refills: 0 | Status: SHIPPED | OUTPATIENT
Start: 2021-04-30 | End: 2021-05-10

## 2021-04-30 RX ORDER — BISACODYL 10 MG
10 SUPPOSITORY, RECTAL RECTAL DAILY PRN
Qty: 10 SUPPOSITORY | Refills: 0 | COMMUNITY
Start: 2021-04-30 | End: 2021-05-30

## 2021-04-30 RX ADMIN — ASPIRIN 81 MG CHEWABLE TABLET 81 MG: 81 TABLET CHEWABLE at 08:13

## 2021-04-30 RX ADMIN — METOPROLOL TARTRATE 50 MG: 50 TABLET, FILM COATED ORAL at 08:14

## 2021-04-30 RX ADMIN — BACLOFEN 10 MG: 10 TABLET ORAL at 08:14

## 2021-04-30 RX ADMIN — CEFDINIR 300 MG: 300 CAPSULE ORAL at 08:13

## 2021-04-30 RX ADMIN — ENOXAPARIN SODIUM 40 MG: 40 INJECTION SUBCUTANEOUS at 08:15

## 2021-04-30 RX ADMIN — FERROUS SULFATE TAB 325 MG (65 MG ELEMENTAL FE) 325 MG: 325 (65 FE) TAB at 08:14

## 2021-04-30 RX ADMIN — CLOPIDOGREL 75 MG: 75 TABLET, FILM COATED ORAL at 08:14

## 2021-04-30 RX ADMIN — ATORVASTATIN CALCIUM 80 MG: 80 TABLET, FILM COATED ORAL at 08:14

## 2021-04-30 RX ADMIN — LOSARTAN POTASSIUM 50 MG: 50 TABLET, FILM COATED ORAL at 08:13

## 2021-04-30 RX ADMIN — FLUTICASONE PROPIONATE 2 SPRAY: 50 SPRAY, METERED NASAL at 08:15

## 2021-04-30 ASSESSMENT — PAIN SCALES - GENERAL: PAINLEVEL_OUTOF10: 0

## 2021-04-30 ASSESSMENT — PAIN DESCRIPTION - LOCATION: LOCATION: HIP;KNEE;OTHER (COMMENT)

## 2021-04-30 NOTE — PATIENT CARE CONFERENCE
[address] [place], [place] [address] [facility] ACUTE REHABILITATION  TEAM CONFERENCE NOTE/PATIENT PLAN OF CARE    Date: 2021  Admission date: 4/15/2021  Patient Name: Pérez Thompson        MRN: [id number]    : 1950  (72 y.o.)  Gender: male   Rehab diagnosis/surgery with date:  Right MCA stroke  Impairment Group Code:  1.1      MEDICAL/FUNCTIONAL HISTORY/STATUS:  ready for discharge, will go home with indwelling barriga catheter    Consultations/Labs/X-rays: none recent      MEDICATION UPDATE:  omnicef for 2-3 more days for recent UTi and pneumonia      NURSING :    Bowel:   Always Continent  [x]   Occasionally incontinent  []   Frequently incontinent  []   Always incontinent  []   Not occurred  []     Bladder:   Always Continent  []    Incontinent less than daily[]   Incontinent  daily []   Always incontinent  []   No urine output    []   Indwelling catheter [x]       Toilet Hygiene:   Current level : supervision  Short term Toilet hygiene goal: Modified independenti  Long term toilet hygiene goal:  independent    Skin integrity: intact  Pain: left leg pain    NUTRITION    Diet  general carb controlled  Liquid consistency   thin    SOCIAL INFORMATION:  Lives with: spouse  Prior [address] with 3 entry steps, 2 rails, side entrance has a ramp  Prior Level of function:  independent with cane or wheeled walker  DME:  wheelchair, quad cane , wheeled walker, scooter, shower chair, hospital bed, 3 in 1 commode    FAMILY / PATIENT EDUCATION:  completed with patient and spouse    PHYSICAL THERAPY    Bed mobility:   Current level: supervision  Short term bed mobility goal: supervision  Long term bed mobility goal: independent    Chair/bed transfers:  Current level: supervision-Contact guard assist for stand pivots with Up Walker  Short term Chair/bed transfers goal: min assist  Long term Chair/bed transfers goal: supervision      Ambulation:   Current level: 110 ft. with UpWalker at Short term toilet transfer goal: Modified independent  Long term toilet transfer goal: Modified independent      SPEECH THERAPY  Swallowing:  Current level:  Modified independent  Short term swallowing goal: Modified independent  Long term swallowing Goal: Modified independent    Comprehension:   Current level: Modified independent  Short term comprehension goal: Modified independent  Long term comprehension goal: Modified independent    Expression:   Current level: Modified independent  Short term expression goal: Modified independent  Long term expression goal: Modified independent    Problem solving:   Current level: min assist  Short term problem solving goal: min assist  Long term problem solving goal: min assist    Memory:  Current level: min assist  Short term memory goal: min assist  Long term memory goal: min assist    Social interaction:  supervision, goal  met    Safety awareness: fair      Patient/family's personal goals: go home today  Factors supporting goal achievement:  made gains  Factors hindering goal achievement:  endurance      Discharge Plan   Estimated Length of Stay: home today       4/30/21      Destination: home  Services at Discharge: home health for nursing and PT-refusing OT and speech  Equipment at Discharge: no DME needs      INTERDISCIPLINARY TEAM/PHYSICIAN RECOMMENDATION AND/OR REVISIONS OF PLAN OF CARE:  discharge home today with instructions      I approve the established interdisciplinary plan of care as documented within the medical record of Katie Alvarenga. Electronically signed by Felicitas Braden RN  on 4/30/2021 at 8:10 AM  The following interdisciplinary team members were present:  Sara Bermeo, RENZO Jackson, LSW  KAYLIN Velazquez, OTR  Kenya Hancock, CCC-SLP

## 2021-04-30 NOTE — PROGRESS NOTES
[address] [place] [address] [facility]  Progress Note    Chief complaint: Fever and urinary retention  Face to face encounter   SUBJECTIVE:  Patient is tolerating medications. No reported adverse drug reactions. No nausea, vomiting, diarrhea. Up in chair. Has barriga in place. Review of systems:  As stated above in the chief complaint, otherwise negative. Medications:  Scheduled Meds:   fluticasone-umeclidin-vilant  1 puff Inhalation Daily    cefdinir  300 mg Oral 2 times per day    losartan  50 mg Oral Daily    tamsulosin  0.4 mg Oral Nightly    ferrous sulfate  325 mg Oral Daily with breakfast    fluticasone  2 spray Each Nostril Daily    aspirin  81 mg Oral Daily    atorvastatin  80 mg Oral Daily    baclofen  10 mg Oral TID    clopidogrel  75 mg Oral Daily    enoxaparin  40 mg Subcutaneous Daily    metoprolol tartrate  50 mg Oral BID     Continuous Infusions:  PRN Meds:sodium chloride flush, bismuth subsalicylate, bisacodyl, acetaminophen, magnesium hydroxide    OBJECTIVE:  /74   Pulse 95   Temp 98.6 °F (37 °C) (Temporal)   Resp 16   Ht 6' 1\" (1.854 m)   Wt 297 lb 11.2 oz (135 kg)   SpO2 97%   BMI 39.28 kg/m²   Temp  Av.4 °F (36.9 °C)  Min: 98.2 °F (36.8 °C)  Max: 98.6 °F (37 °C)  Constitutional: The patient is awake, alert, and oriented. No issues. Skin: Warm and dry. No rashes were noted. HEENT: Round and reactive pupils. Moist mucous membranes. No ulcerations or thrush. Neck: Supple to movements. Chest: No use of accessory muscles to breathe. Symmetrical expansion. No wheezing, crackles or rhonchi. Cardiovascular: S1 and S2 are rhythmic and regular. Abdomen: Positive bowel sounds to auscultation. Genitourinary: Male, barriga yellow urine  Extremities: No clubbing, no cyanosis, +edema.   Lines: peripheral  Barriga - yellow urine     Laboratory and Tests Review:  Lab Results   Component Value Date    WBC 9.2 2021    WBC 10.0 2021    WBC 20.3 (H) 04/19/2021    HGB 10.1 (L) 04/22/2021    HCT 32.4 (L) 04/22/2021    MCV 91.8 04/22/2021     04/22/2021     Lab Results   Component Value Date    NEUTROABS 5.72 04/22/2021    NEUTROABS 7.42 (H) 04/20/2021    NEUTROABS 17.53 (H) 04/19/2021     No results found for: CRP  Lab Results   Component Value Date    ALT 25 04/22/2021    AST 27 04/22/2021    ALKPHOS 62 04/22/2021    BILITOT 0.8 04/22/2021     Lab Results   Component Value Date     04/22/2021    K 4.1 04/22/2021    K 3.7 04/16/2021     04/22/2021    CO2 26 04/22/2021    BUN 13 04/22/2021    CREATININE 1.1 04/22/2021    CREATININE 0.9 04/20/2021    CREATININE 0.9 04/18/2021    GFRAA >60 04/22/2021    LABGLOM >60 04/22/2021    GLUCOSE 94 04/22/2021    GLUCOSE 90 03/28/2012    PROT 6.0 04/22/2021    LABALBU 3.6 04/22/2021    CALCIUM 8.9 04/22/2021    BILITOT 0.8 04/22/2021    ALKPHOS 62 04/22/2021    AST 27 04/22/2021    ALT 25 04/22/2021     No results found for: CRP  [facility] results found for: [facility]  Radiology:      Microbiology:   Lab Results   Component Value Date    [facility]  04/18/2021     This isolate is often of questionable clinical significance. Validated  susceptibility testing methods do not exist for this isolate. This organism was isolated in one set. Susceptibility testing is not routinely done as this  organism frequently represents skin contamination.       ORG Aerococcus urinae 04/18/2021    ORG Aerococcus urinae 04/18/2021     Lab Results   Component Value Date    BLOODCULT2 5 Days no growth 04/18/2021    ORG Aerococcus urinae 04/18/2021    ORG Aerococcus urinae 04/18/2021     Urine Culture, Routine   Date Value Ref Range Status   04/18/2021 <10,000 CFU/mL  Gram positive organism   (A)  Final   04/18/2021 >100,000 CFU/ml  Final     MRSA Culture Only   Date Value Ref Range Status   06/22/2016 Methicillin resistant Staph aureus not isolated  Final       ASSESSMENT:  · UTI with bacteremia Aerococcus associated with Aerococcus and urine manipulation  · Urinary retention - has barriga     PLAN:  · Continue with Rocephin over the weekend then switch to [address] [facility] rec complete  · Check final cultures  · Monitor labs  · Repeat blood culture-no growth   · Urology following   · Home today. Rohan Rutledge  10:24 AM  4/30/2021   Pt seen and examined. Above discussed agree with advanced practice nurse. Labs, cultures, and radiographs reviewed. Face to Face encounter occurred. Changes made as necessary.      Zi Arellano MD

## 2021-04-30 NOTE — PLAN OF CARE
In prep for tomorrow's dc, reviewed imptc of contd skin protection strats. Buttocks and heels remain redenned but blanchable. Geraldo Santos tilt and lays on side periodically but takes the pillow out fr beneath his calves. Asks that therapy staff inform in am re the time he can be released so that he can call wifee re expected  time.

## 2021-04-30 NOTE — PLAN OF CARE
Problem: Falls - Risk of:  Goal: Will remain free from falls  Description: Will remain free from falls  4/30/2021 1019 by Karol Brady RN  Outcome: Met This Shift  4/29/2021 2354 by Matilde Farias RN  Outcome: Ongoing  Goal: Absence of physical injury  Description: Absence of physical injury  4/30/2021 1019 by Karol Brady RN  Outcome: Met This Shift  4/29/2021 2354 by Matilde Farias RN  Outcome: Ongoing     Problem: Pain:  Goal: Pain level will decrease  Description: Pain level will decrease  4/30/2021 1019 by Karol Brady RN  Outcome: Met This Shift  4/29/2021 2354 by Matilde Farias RN  Outcome: Ongoing  Goal: Control of acute pain  Description: Control of acute pain  4/30/2021 1019 by Karol Brady RN  Outcome: Met This Shift  4/29/2021 2354 by Matilde Farias RN  Outcome: Ongoing  Goal: Control of chronic pain  Description: Control of chronic pain  4/30/2021 1019 by Karol Brady RN  Outcome: Met This Shift  4/29/2021 2354 by Matilde Farias RN  Outcome: Ongoing     Problem: Skin Integrity:  Goal: Will show no infection signs and symptoms  Description: Will show no infection signs and symptoms  4/30/2021 1019 by Karol Brady RN  Outcome: Met This Shift  4/29/2021 2354 by Matilde Farias RN  Outcome: Ongoing  Goal: Absence of new skin breakdown  Description: Absence of new skin breakdown  4/30/2021 1019 by Karol Brady RN  Outcome: Met This Shift  4/29/2021 2354 by Matilde Farias RN  Outcome: Ongoing  4/29/2021 2346 by Mandeep Alonzo RN  Outcome: Ongoing

## 2021-04-30 NOTE — DISCHARGE SUMMARY
Rehabilitation Discharge Summary     Patient Identification  Kaci Antoine is a 66 y.o. male. :  1950  Admit Date:  4/15/2021  Discharge date and time: No discharge date for patient encounter. Attending Provider: Neema Rae MD                                     Admission Diagnoses:   Ischemic cerebral vascular accident (CVA) due to stenosis of large extracranial artery ([facility] [I63.20]  Ischemic stroke [facility]) [I63.9]    Discharge Diagnoses:   Patient Active Problem List   Diagnosis    Cerebrovascular accident (CVA) due to occlusion of right middle cerebral artery [facility]    Coronary artery disease involving native coronary artery of native heart without angina pectoris    History of myocardial infarction    Essential hypertension    Mixed hyperlipidemia    Status post placement of implantable loop recorder    Acute ischemic stroke ([facility]    History of CVA (cerebrovascular accident) without residual deficits    History of TIA (transient ischemic attack)    Acute CVA (cerebrovascular accident) ([facility]    Ischemic cerebral vascular accident (CVA) due to stenosis of large extracranial artery ([facility]    Red blood cell antibody positive    Ischemic stroke [facility])        Discharge Physician: Neema Rae MD    Admission Functional Status:                   Initial Evaluation  21          Short Term Goals Long Term Goals    Was pt agreeable to Eval/treatment? yes Initial Evaluation Initial Evaluation       Does pt have pain?  L hip pain - hematoma from a fall at home           Bed Mobility  Rolling: SBA  Supine to sit: SBA  Sit to supine: SBA  Scooting: SBA     Supervision Independent   Transfers Sit to stand: Mod A  Stand to sit: Mod A  Stand pivot: Min A with [employer] and [employer] Brennan     Min A Supervision   Ambulation   50 feet with UpWalker with Min A     150 feet with AAD with SBA >150 feet with AAD with Supervision   Walking 10 feet on uneven surface 10 feet with UpWalker with Mod A     10 feet with AAD with Min A 10 feet with AAD with Supervision   Wheel Chair Mobility 25 feet Max A     50 feet  feet Mod I   Car Transfers NT - deferred today due to pt's difficulty with STS from high chair     Min A Supervision   Stair negotiation: ascended and descended 1 step x2 reps with B rails with Min A - backwards descending     4 steps with B rail with Min A 4 steps with B rail with Supervision   Curb Step:   ascended and descended NT           Picking up object off the floor Picked up a cone with Min A     Will  an object with SBA Will  an object with Supervision   BLE ROM WFL           BLE Strength RLE 5/5  LLE 4-/5           Balance  Sitting: Supervision  Standing: Min/Mod A                    Occupational Therapy :  Balance  Sitting Balance: Stand by assistance  Standing Balance: Moderate assistance  Functional Mobility  Functional - Mobility Device: Platform rollator  Activity: Other  Assist Level: Moderate assistance  Functional Mobility Comments: vc's for hand placement & safety  Toilet Transfers  Toilet - Technique: Stand pivot  Equipment Used: Raised toilet seat with rails  Toilet Transfer: Moderate assistance  Toilet Transfers Comments: vc's for hand placement & safety      ADL  Feeding: Minimal assistance;Setup  Grooming: Minimal assistance; Increased time to complete;Setup  UE Bathing: Minimal assistance; Increased time to complete;Verbal cueing;Setup  LE Bathing: Moderate assistance; Increased time to complete;Verbal cueing;Setup  UE Dressing: Minimal assistance; Increased time to complete;Verbal cueing;Setup  LE Dressing: Maximum assistance;Setup;Verbal cueing; Increased time to complete(Pt require assist to thread depends & shorts & assist to pull over his hips.  Pt require assist to don long socks & to don L croc)  Toileting: Maximum assistance  Additional Comments: vc's for hand placement      Coordination  Movements Are Fluid And Coordinated: No  Coordination and Movement description: Fine motor impairments;Gross motor impairments; Left UE  Bed mobility  Supine to Sit: Minimal assistance  Scooting: Contact guard assistance  Comment: vc's for hand placement & safety   Transfers  Stand Pivot Transfers: Moderate assistance  Sit to stand: Minimal assistance  Stand to sit: Minimal assistance  Transfer Comments: Increased time to move & vc's for hand placement/safety      Vision - Basic Assessment  Prior Vision: Wears glasses all the time  Visual History: No significant visual history  Patient Visual Report: No visual complaint reported.      Cognition  Overall Cognitive Status: Exceptions  Arousal/Alertness: Delayed responses to stimuli  Following Commands: followed a 2/3 step command  Problem Solving: Assistance required to generate solutions;Assistance required to correct errors made  Insights: Decreased awareness of deficits  Initiation: Requires cues for some  Sequencing: Requires cues for some  Sensation     Speech Therapy :                 DYSPHAGIA DIAGNOSIS:  Mild oral stage dysphagia attributed to mild left labiobuccal and lingual weakness and resolving xerostomia. Pharyngeal stage appears functional.                            LFMY RECOMMENDATIONS:  Continue Dysphagia 3, Soft/advanced (Soft & Bite-sized) solids with thin liquids.  SLP will trial regular consistency foods tomorrow morning, 4/17.          Swallowing                          Current Status             5--Supervision               Short Term Goal         6--Modified Independent                       Long Term Goal          6--Modified Independent              Receptive                          Current Status             5--Supervision               Short Term Goal         6--Modified Independent                       Long Term Goal          6--Modified Independent              Expressive                          OSQMVIQ Status             5--Supervision               Short Term Goal         6--Modified [address]  Long Term Goal          6--Modified Independent              Social Interaction                          XOUSDPT Status             4--Minimal Assistance               Short Term Goal         5--Supervision               Long Term Goal          5--Supervision                                                     Problem Solving                          CKMDJCV Status             4--Minimal Assistance               Short Term Goal         5--Supervision               Long Term Goal          5--Supervision                  Memory                          HMRNBNT Status             3--Moderate Assistance                        Short Term Goal         4--Minimal Assistance               Long Term Goal          5--Supervision                                        Discharge Functional Status:      Initial Evaluation  4/16/21 Levels as of 4/30/21     Short Term Goals Long Term Goals    Bed Mobility  Rolling: SBA  Supine to sit: SBA  Sit to supine: SBA  Scooting: SBA Supine<>sit: Supervision  Rolling: Supervision Supervision Independent   Transfers Sit to stand:  Mod A  Stand to sit: Mod A  Stand pivot: Min A with [employer] and LOL[employer] Brennan Sit to stand: Supervision  Stand to sit: Supervision  Stand pivot: Supervision with [place] A Supervision   Ambulation   50 feet with UpWalker with Min A 75 feet x2 reps with UpWalker SBA/Supervision 150 feet with AAD with SBA >150 feet with AAD with Supervision   Walking 10 feet on uneven surface 10 feet with UpWalker with Mod A 10 feet with UpWalker SBA 10 feet with AAD with Min A 10 feet with AAD with Supervision   Wheel Chair Mobility 25 feet Max A NT 50 feet  feet Mod I   Car Transfers NT - deferred today due to pt's difficulty with STS from high chair CGA Min A Supervision   Stair negotiation: ascended and descended 1 step x2 reps with B rails with Min A - backwards descending 4 steps x2 sets with B rails SBA/Supervision - backwards descending  4 steps with B rail with Min A 4 steps with B rail with Supervision   Curb Step:   ascended and descended NT NT       Picking up object off the floor Picked up a cone with Min A SBA with reacher Will  an object with SBA Will  an object with Supervision   BLE ROM WFL WFL       BLE Strength RLE 5/5  LLE 4-/5 RLE 5/5  LLE 4-/5       Balance  Sitting: Supervision  Standing: Min/Mod A Sitting: Supervision  Standing: SBA/Supervision with [employer]/UpWalker             Date   Status   AE    Comments     Feeding   4/29/21   Mod I              Grooming   4/29/21   Mod I                    Oral Care   4/29/21   Mod I             Bathing   4/29/21   S Shower bench          UB Dressing   4/29/21   Mod I              LB Dressing   4/29/21   SBA             Footwear   4/29/21         S         To doff socks and don crocs seated     Toileting   4/30/21   CGA             Homemaking   4/23/21   CGA   rollator              Functional Transfers / Balance:      Date Status DME  Comments   Sit Balance 4/29/21   Ind       Stand Balance 4/29/21   SBA upwalker     []? Tub  [x]? Shower   Transfer 4/29/21 CGA Shower bench, w/w, grab bars     Commode   Transfer 4/29/21   SBA Standard toilet, w/w     Functional   Mobility 4/29/21   SBA W/w      Other:  EOB>WC    4/22/21    Min A          Functional Exercises / Activity        Sensory / Neuromuscular Re-Education:        Cognitive Skills:    Status Comments   Problem   Solving fair      Memory fair      Sequencing fair      Safety fair         DYSPHAGIA DIAGNOSIS:  Mild/resolving oral stage dysphagia attributed to mild left labiobuccal and lingual weakness and resolving xerostomia. Pharyngeal stage appears functional.                            DIET RECOMMENDATIONS:   Regular consistency solids.  Continue thin liquids.          Swallowing                          Current Status             6--Modified Independent               Short Term Goal         6--Modified Independent                       Long Term Goal          6--Modified Independent              Receptive                          XUXERPV Status            6--Modified Independent                 Short Term Goal         6--Modified Independent                       Long Term Goal          6--Modified Independent              Expressive                          GJAHMEO Status            6--Modified Independent               Short Term Goal         6--Modified Independent                       Long Term Goal          6--Modified Independent              Social Interaction                          VEYMMVW Status            5--Supervision              Short Term Goal         5--Supervision               Long Term Goal          5--Supervision                                                     Problem Solving                          FXCXSIT Status            4--Minimal Assistance               Short Term Goal         4--Minimal Assistance            Long Term Goal          4--Minimal Assistance                 Memory                          ECTGNOK Status            4--Minimal Assistance                      Short Term Goal         4--Minimal Assistance            Long Term Goal          4--Minimal Assistance                Indication for Admission: Limited mobility and self care secondary to stroke and recent fall with contusion (L) LE and close medical management    Rehabilitation Course: He did participate in a comprehensive inpatient rehab program.  He made functional gains as outlined above. He did develop significant elevation of white count. This was felt to be secondary to a urinary tract infection. Warren catheter was inserted per urology. He was maintained on antibiotics as per infectious disease and followed closely. He was monitored for anemia. He had an epistaxis requiring packing which did improve. He was maintained on DVT prophylaxis.   He was maintained on secondary stroke prevention with dual Latest Ref Range: 20.0 - 42.0 % 16.4 (L) 25.0    Monocytes % Latest Ref Range: 2.0 - 12.0 % 7.2 9.3    Eosinophils % Latest Ref Range: 0.0 - 6.0 % 1.5 2.2    Basophils % Latest Ref Range: 0.0 - 2.0 % 0.4 0.5    Neutrophils Absolute Latest Ref Range: 1.80 - 7.30 E9/L 7.42 (H) 5.72    Immature Granulocytes # Latest Units: E9/L 0.06 0.10    Lymphocytes Absolute Latest Ref Range: 1.50 - 4.00 E9/L 1.64 2.31    Monocytes Absolute Latest Ref Range: 0.10 - 0.95 E9/L 0.72 0.86    Eosinophils Absolute Latest Ref Range: 0.05 - 0.50 E9/L 0.15 0.20    Basophils Absolute Latest Ref Range: 0.00 - 0.20 E9/L 0.04 0.05    CULTURE, BLOOD 3 Unknown   Rpt   Culture, Blood 3 Unknown   5 Days no growth       Treatments: therapies: PT, OT, ST, RN and SW    Discharge Exam:  Vitals:    04/30/21 0800   BP:    Pulse:    Resp:    Temp: 98.6 °F (37 °C)   SpO2:          Disposition: Home with his wife    Condition: Improved    Patient Instructions:    Ritika Galan   Home Medication Instructions BEE:[id number]    Printed on:06/10/21 2220   Medication Information                      aspirin 81 MG tablet  Take 81 mg by mouth every other day             atorvastatin (LIPITOR) 80 MG tablet  Take 80 mg by mouth daily. baclofen (LIORESAL) 10 MG tablet  Take 1 tablet by mouth 3 times daily             bismuth subsalicylate (PEPTO BISMOL) 262 MG/15ML suspension  Take 30 mLs by mouth every 6 hours as needed for Indigestion or Diarrhea             clopidogrel (PLAVIX) 75 MG tablet  Take 75 mg by mouth daily.                ferrous sulfate (IRON 325) 325 (65 Fe) MG tablet  Take 1 tablet by mouth daily (with breakfast)             fluticasone (FLONASE) 50 MCG/ACT nasal spray  2 sprays by Each Nostril route daily             fluticasone-umeclidin-vilant (TRELEGY ELLIPTA) 100-62.5-25 MCG/INH AEPB  Inhale 1 puff into the lungs daily             losartan (COZAAR) 50 MG tablet  Take 1 tablet by mouth daily             metoprolol tartrate

## 2021-04-30 NOTE — PROGRESS NOTES
Occupational Therapy  OCCUPATIONAL THERAPY DAILY NOTE/ Discharge Summary    Date:2021  Patient Name: Andrea Palacios  MRN: [id number]  : 1950  Room: [address] [place] [address]A     Diagnosis: CVA- RMCA  Precautions: Fall Risk & min [place]    Functional Assessment:   Date Status AE  Comments   Feeding 21 Mod I      Grooming 21 Mod I            Oral Care 21 Mod I     Bathing 21 S Shower bench     UB Dressing 21 Mod I      LB Dressing 21 SBA     Footwear 21     S     To doff socks and don crocs seated    Toileting 21 CGA     Homemaking 21 CGA rollator      Functional Transfers / Balance:   Date Status DME  Comments   Sit Balance 21 Ind     Stand Balance 21 SBA upwalker    [] Tub  [x] Shower   Transfer 21 CGA Shower bench, w/w, grab bars    Commode   Transfer 21 SBA Standard toilet, w/w    Functional   Mobility 21 SBA W/w     Other:  EOB>WC   21   Min A       Functional Exercises / Activity      Sensory / Neuromuscular Re-Education:      Cognitive Skills:   Status Comments   Problem   Solving fair     Memory fair     Sequencing fair     Safety fair       Visual Perception:    Education:      [x] Family teach completed on: Pt's wife present during family teach this PM and observes pt donning compression sock on L foot and donning/doffing backless shoe. She communicates pt needs time to process instructions and wash showering on his own, getting dressed and completing transfers without assistance. She stated that requiring time to think about things before he does them is how he was prior to his strokes. She declined seeing him transfer to the commode and any other transfers. She did see him transfer from [facility] chair with arms. Pt's wife also states that they have an extended tub bench at home. Pain Level: Additional Notes:       Patient has made good  progress during treatment sessions toward set goals.  Therapy emphasis to obtain goals:Strengthening, Gait Training, Patient/Caregiver Education & Training, Home Management Training, ROM, Equipment Evaluation, Education, & procurement, Balance Training, Neuromuscular Re-education, Functional Mobility Training, Cognitive Reorientation, Positioning, Endurance Training, Safety Education & Training, Self-Care / ADL    [] Continue with current OT Plan of care. [x] Prepare for Discharge     DISCHARGE RECOMMENDATIONS   OCCUPATIONAL THERAPY DISCHARGE SUMMARY    Discontinue Occupational Therapy intervention. Pt has:   [] met all set goals. [] made good progress toward set goals. [] has made slow progress toward goals and would benefit from rehab setting change.  [] had a medical decline and therefore was transferred off the Rehab unit.     Long term goals  Time Frame for Long term goals : 2-3 weeks to address above problem areas  Long term goal 1: Pt demo independent to eat all meals  Long term goal 2: Pt demo Mod I grooming seated  Long term goal 3: Pt demo SBA to bathe @ shower level both seated & standing using a long handled sponge  Long term goal 4: Pt demo I UUE & Mod I to don underwear & pants & min A to don socks & shoes  Long term goal 5: Pt demo Mod I commode trf with appropraite DME to ensure pt safety  Long term goals 6: Pt demo SBA walk in shower trf using approrpiate DME to ensure pt safety  Long term goal 7: Pt demo SBA walk in shower trf using appropriate DME to ensure pt safety  Long term goal 8: Pt dmeo G- endurance for a 20 minute functional activity  Long term goal 9: Pt demo improved FMC & strength BUE to improve pt ability to complete ADLs as evidenced by gains in the following areas: 9- hole peg test- L 1 minute & .5 sec & norm for pt age & gender is 21.4 sec & R hand 34.2 sec & norm for pt age & gender is 19.9 sec &  strength- L hand 40# & norm for tp age & gender is 65# & R hand 62# & norm for pt age & gender is 75#    The Patient has received education on:  [x]Transfer Safety [x]Compensatory tech for ADLs [x]AE training [x]DME training []Energy Conservation [x]Home / Kitchen Safety  [x]Fall Prevention  []Other:    Family training was completed: yes    Recommendations: No OT recommended      Recommended DME:    Post Discharge Care:   []Home Independently  []Home with 24hr Care / Supervision []Home with Partial Supervision []Home with Home Health OT []Home with Out Pt OT []Other: ___   Comments:         Time in Time out Tx Time Breakdown  Variance:   First Session    [] Individual Tx-   [] Concurrent Tx -    [] Co-Tx -   [] Group Tx -   [] Time Missed -     Second Session   [] Individual Tx-  [] Concurrent Tx -   [] Co-Tx -   [] Group Tx -   [] Time Missed -     Third Session    [] Individual Tx-   [] Concurrent Tx -  [] Co-Tx -   [] Group Tx -   [] Time Missed -         Total Tx Time 0 Mins      Jesus Friedman  QUINONES/L [id number]  I have read the above note and agree with the documentation.   Phil Najera OTR/L [id number]

## 2021-04-30 NOTE — CARE COORDINATION
Per Team:  Plan dc 4/30/21. LTG: Min A / Mod I/Independent. DME - None- he has all.     Aftercare - HHC - RN & PT.  Pt's spouse declines OT/SP. Referral to [facility] per choice.     FT 4/26 pm.     The Plan for Transition of Care is related to the following treatment goals: home with [place] [id number]     The Patient and/or patient representative John Patiño was provided with a choice of provider and agrees   with the discharge plan. [x]? Yes []? No     Freedom of choice list was provided with basic dialogue that supports the patient's individualized plan of care/goals, treatment preferences and shares the quality data associated with the providers. [x]? Yes []?  No    Cleo Jackson  4/30/2021

## 2021-04-30 NOTE — PROGRESS NOTES
Speech Language Pathology  ACUTE REHABILITATION  DISCHARGE SUMMARY            SUMMARY OF EVALUATION    ADMITTING DIAGNOSIS: Ischemic cerebral vascular accident (CVA) due to stenosis of large extracranial artery (HCC) [I63.20]  Ischemic stroke ([facility] [address] [I63.9]     DYSPHAGIA DIAGNOSIS:  Mild/resolving oral stage dysphagia attributed to mild left labiobuccal and lingual weakness and resolving xerostomia. Pharyngeal stage appears functional.                            DIET RECOMMENDATIONS:   Regular consistency solids. Continue thin liquids.                 FEEDING RECOMMENDATIONS:                           Assistance level:  No assistance needed                             Compensatory strategies recommended: Small bites/sips     THERAPY RECOMMENDATIONS:  Dysphagia therapy is not recommended. SPEECH PATHOLOGY DIAGNOSIS:    Mild cognitive deficit. Minimal dysarthria with longstanding left labiobuccal and lingual weakness. Patient feels his cognition and speech are at baseline. Patient's wife in agreement.      THERAPY RECOMMENDATIONS:   Additional Speech Pathology intervention is recommended.  See below.                                                                                                                                           FIMS SCORES                            Swallowing                          Current Status             6--Modified Independent               Short Term Goal         6--Modified Independent                       Long Term Goal          6--Modified Independent              Receptive                          Current Status            6--Modified Independent                 Short Term Goal         6--Modified Independent                       Long Term Goal          6--Modified Independent              Expressive                          Current Status            6--Modified Independent               Short Term Goal         6--Modified Independent                       Long Term Goal 6--Modified Independent              Social Interaction                          Current Status            5--Supervision              Short Term Goal         5--Supervision               Long Term Goal          5--Supervision                                                     Problem Solving                          Current Status            4--Minimal Assistance               Short Term Goal         4--Minimal Assistance            Long Term Goal          4--Minimal Assistance                 Memory                          Current Status            4--Minimal Assistance                      Short Term Goal         4--Minimal Assistance            Long Term Goal          4--Minimal Assistance                             EDUCATION:     Speech Pathologist (SLP) completed education with the patient/family regarding type of swallowing impairment. Reviewed current solid/liquid consistency diet recommendations and discussed compensatory strategies to ensure safe PO intake. Reviewed aspiration precautions. Encouraged patient and/or family to engage SLP in unstructured Q&A session relative to identified deficit areas; indicated understanding of all information provided via satisfactory verbal response. Speech Pathologist (SLP) completed education with the patient/family regarding identified cognitive linguistic deficits. Reviewed compensatory strategies to improve functional outcome. Encouraged patient/family to engage SLP in structured Q&A session relative to identified deficit areas. Patient/family indicated understanding of all information provided via satisfactory verbal response. OUTCOME:       Good progress made toward goals. Family teach session completed with patient and patient's wife. Patient's wife initially reported she did not feel patient needed to receive speech therapy.  SLP explained focus on cognition (versus speech itself), as patient reported a decline in memory and overall \"thinking\" since his most recent stroke. SLP reviewed goals of therapy and progress made with patient and wife. All questions answered. Patient's wife stated she is \"leaving it up to the patient\" if he wants to continue speech therapy during ARU stay but emphasized she does not feel he needs it at discharge. SP recommended after discharge: No -- wife feels patient is at baseline from previous CVAs ~3 and ~5 years ago. Patient in agreement and does not feel needs additional speech therapy.   Supervision recommended at discharge: 24 hour

## 2021-04-30 NOTE — PROGRESS NOTES
Physical Therapy  Daily Treatment Note/Discharge Summary  Evaluating Therapist: Donna Titus DPT    NAME: Song Lindsey  ROOM: 3250U  DIAGNOSIS: Ischemic cerebral vascular accident -  R MCA  PRECAUTIONS: Falls, L hemiparesis, L inattention   PROCEDURE(S): 4/13 tPA, IR mechanical art intracranial thrombectomy    HPI: Presented on 4/13/21 with left sided weakness. He has history of prior stroke and is on asa and plavix as well as CAD, HLD, HTN, and MI. NIHSS 15. He was found to have right MCA M1 occlusion. Dr. Alem Padilla completed successful thrombectomy with TICI 3 reperfusion and received TPA. Social:  Pt lives with wife in a 1 story floor plan with 3 steps and 2 rails to enter. Pt uses a ramp and transport chair to enter/exit home. Prior to admission pt reports not doing much walking around the house and primarily used a transport chair to get around. Pt states that he uses a \"Upwalker\" to get into/out of his truck. Pt also owns a transport wheelchair, power wheelchair, and canes. Initial Evaluation  4/16/21 Levels as of 4/30/21     Short Term Goals Long Term Goals    Bed Mobility  Rolling: SBA  Supine to sit: SBA  Sit to supine: SBA  Scooting: SBA Supine<>sit: Supervision  Rolling: Supervision Supervision Independent   Transfers Sit to stand:  Mod A  Stand to sit: Mod A  Stand pivot: Min A with [employer] and [employer] Brennan Sit to stand: Supervision  Stand to sit: Supervision  Stand pivot: Supervision with [employer] A Supervision   Ambulation   50 feet with UpWalker with Min A 75 feet x2 reps with UpWalker SBA/Supervision 150 feet with AAD with SBA >150 feet with AAD with Supervision   Walking 10 feet on uneven surface 10 feet with UpWalker with Mod A 10 feet with UpWalker SBA 10 feet with AAD with Min A 10 feet with AAD with Supervision   Wheel Chair Mobility 25 feet Max A NT 50 feet  feet Mod I   Car Transfers NT - deferred today due to pt's difficulty with STS from high chair CGA Min A Supervision Stair negotiation: ascended and descended 1 step x2 reps with B rails with Min A - backwards descending 4 steps x2 sets with B rails SBA/Supervision - backwards descending  4 steps with B rail with Min A 4 steps with B rail with Supervision   Curb Step:   ascended and descended NT NT     Picking up object off the floor Picked up a cone with Min A SBA with reacher Will  an object with SBA Will  an object with Supervision   BLE ROM WFL WFL     BLE Strength RLE 5/5  LLE 4-/5 RLE 5/5  LLE 4-/5     Balance  Sitting: Supervision  Standing: Min/Mod A Sitting: Supervision  Standing: SBA/Supervision with WW/UpWalker     Date Family Teach Completed TBA Wife on 4/26/21     Is additional Family Teaching Needed? Y or N Y N     Hindering Progress Weakness Weakness     PT recommended ELOS 2-3 weeks      Team's Discharge Plan       Therapist at Team Meeting           Additional Comments: Pt d/c to home today with wife. Pt declined PT session today. Pt's wife present for family training on 4/26/21. Pt's wife reported that she is comfortable with assuming care for pt and stated multiple times that she has been caring for pt previously. Pt did make progress in therapy however was limited by his weakness and debility. Pt reported primarily using a transport chair at home and did not ambulate much. Pt unable to propel the w/c he had on this unit stating it was too hard to propel and that his transport chair at home is lighter. Pt prefers to use UpWalker when ambulating and insisted he will continue to do so at discharge. Educated pt and pt's wife on proper car transfer and that due to his continued weakness he should modify how he gets into the car and what vehicle he rides in. Pt's wife insisted that he can step up onto the running board of the truck and enter. [facility]. Pt d/c with a gait speed of .35 m/s indicating that he is a household walker and is at an increased risk for falls.     Jenn Weir, EBONYT [id number]

## 2021-04-30 NOTE — PROGRESS NOTES
Progress Note    Subjective/   66y.o. year old male on the rehab unit for stroke. The heat helped the (L) leg. The heating pad did leak. No other complaints. Ronnell Cook for planned discharge. Objective/   VITALS:  /68   Pulse 74   Temp 98.2 °F (36.8 °C)   Resp 16   Ht 6' 1\" (1.854 m)   Wt 297 lb 11.2 oz (135 kg)   SpO2 95%   BMI 39.28 kg/m²   24HR INTAKE/OUTPUT:      Intake/Output Summary (Last 24 hours) at 4/29/2021 2221  Last data filed at 4/29/2021 2153  Gross per 24 hour   Intake 1260 ml   Output 2125 ml   Net -865 ml     Constitutional:  Alert, awake, no apparent distress   Cardiovascular:  S1, S2 without m/r/g   Respiratory:  CTA B without w/r/r   Abdomen: +BS  Ext: min swelling (L) LE, no calf tenderness, alignment good  Neuro: aaox3, good sitting balance. No tremor    Functional Level      Date   Status   AE    Comments     Feeding   4/29/21   Mod I              Grooming   4/29/21   Mod I        Washed face and hair seated in shower. Seated at sink pt shaves face and applies deodorant. Oral Care   4/29/21   Mod I             Bathing   4/29/21   S Shower bench    UB and LB bathing completed seated on shower bench. S for safety. UB Dressing   4/29/21   Mod I        Don/doff pullover shirt. LB Dressing   4/29/21   SBA       Don/doff underwear and shorts with SBA for safety. Footwear   4/29/21         S         To doff socks and don crocs seated Doff/don socks and don velcroe shoes. Toileting   4/21/21   Min A             Homemaking   4/23/21   CGA   rollator              Functional Transfers / Balance:      Date Status DME  Comments   Sit Balance 4/29/21   Ind       Stand Balance 4/29/21   SBA upwalker     []? Tub  [x]?  Shower   Transfer 4/29/21 CGA Shower bench, w/w, grab bars Tub bench   Commode   Transfer 4/29/21   SBA Standard toilet, w/w     Functional   Mobility 4/29/21   SBA W/w      Other:  EOB>WC    4/22/21    Min A          Functional Exercises / Activity  Pipe tree design activity completed with focus on B hand coordination and problem solving. Pt completes with good skill.      L FMC and strengthening activity retrieving pompoms from table top and placing into resistive container. Pt completes with fair+ skill.      Safety cards with focus on insight and reasoning. Pt completes with fair+ insight and reasoning with min cuing for insight and to problem solve.         Sensory / Neuromuscular Re-Education:        Cognitive Skills:    Status Comments   Problem   Solving fair      Memory fair      Sequencing fair      Safety fair             Scheduled Meds:   fluticasone-umeclidin-vilant  1 puff Inhalation Daily    cefdinir  300 mg Oral 2 times per day    losartan  50 mg Oral Daily    tamsulosin  0.4 mg Oral Nightly    ferrous sulfate  325 mg Oral Daily with breakfast    fluticasone  2 spray Each Nostril Daily    aspirin  81 mg Oral Daily    atorvastatin  80 mg Oral Daily    baclofen  10 mg Oral TID    clopidogrel  75 mg Oral Daily    enoxaparin  40 mg Subcutaneous Daily    metoprolol tartrate  50 mg Oral BID     Continuous Infusions:  PRN Meds:sodium chloride flush, bismuth subsalicylate, bisacodyl, acetaminophen, magnesium hydroxide  I/O last 3 completed shifts: In: 960 [P.O.:960]  Out: 3075 [Urine:3075]  I/O this shift: In: 540 [P.O.:540]  Out: 500 [Urine:500]    Labs reviewed  CBC: No results for input(s): WBC, HGB, PLT in the last 72 hours. BMP:  No results for input(s): NA, K, CL, CO2, BUN, CREATININE, GLUCOSE in the last 72 hours. Hepatic: No results for input(s): AST, ALT, ALB, BILITOT, ALKPHOS in the last 72 hours. BNP: No results for input(s): BNP in the last 72 hours. Lipids: No results for input(s): CHOL, HDL in the last 72 hours. Invalid input(s): LDLCALCU  INR: No results for input(s): INR in the last 72 hours.     Assessment/  Patient Active Problem List:     Cerebrovascular accident (CVA) due to occlusion of right middle cerebral artery ([facility] [address]     Coronary artery disease involving native coronary artery of native heart without angina pectoris     History of myocardial infarction     Essential hypertension     Mixed hyperlipidemia     Status post placement of implantable loop recorder     Acute ischemic stroke (HCC)     History of CVA (cerebrovascular accident) without residual deficits     History of TIA (transient ischemic attack)     Acute CVA (cerebrovascular accident) ([facility] [address]     Ischem[facility]     Red blood cell antibody positive     Ischemic stroke ([facility]      Plan/  1. ID note appreciated. 2. Plan for discharge tomorrow  3. Continue secondary stroke prevention  4. To return home with catheter in place  5. Continue dvt prophylaxis  6.  Continue heating pad to (L) JOSE LUIS Fischer MD

## 2021-05-04 NOTE — DISCHARGE SUMMARY
Discharge Summary     Patient ID:  Zaid Mckee  [id number]  66 y.o. 1950 male  Giuseppe Khan MD        Admit date: 4/13/2021    Discharge date and time:  5/4/2021  6:37 PM      Activity level:   Disposition:  Condition on Discharge:    Admit Diagnoses:     Discharge Diagnoses:     Consults:  IP CONSULT TO NEUROLOGY  IP CONSULT TO PHYSICAL MEDICINE REHAB    Procedures:     Hospital Course:      LABS:  No results for input(s): NA, K, CL, CO2, BUN, CREATININE, GLUCOSE, CALCIUM in the last 72 hours. No results for input(s): WBC, RBC, HGB, HCT, MCV, MCH, MCHC, RDW, PLT, MPV in the last 72 hours. No results for input(s): GLUMET in the last 72 hours. Patient Instructions:   Discharge Medication List as of 4/15/2021  5:49 PM      CONTINUE these medications which have NOT CHANGED    Details   metoprolol tartrate (LOPRESSOR) 50 MG tablet Take 50 mg by mouth 2 times dailyHistorical Med      aspirin 81 MG tablet Take 81 mg by mouth every other dayHistorical Med      atorvastatin (LIPITOR) 80 MG tablet Take 80 mg by mouth daily. Historical Med      clopidogrel (PLAVIX) 75 MG tablet Take 75 mg by mouth daily.   Historical Med      baclofen (LIORESAL) 10 MG tablet Take 1 tablet by mouth 3 times dailyDC to SNF      captopril (CAPOTEN) 50 MG tablet Take 50 mg by mouth 3 times dailyHistorical Med                 Signed:  Electronically signed by Giuseppe Khan MD on 5/4/2021 at 6:37 PM  Discharge Summary     Patient ID:  Zaid Mckee  [id number]  66 y.o. 1950 male  Giuseppe Khan MD        Admit date: 4/13/2021    Discharge date and time: 4-      Activity level: sits in chair  Disposition:acute rehab  Condition on Discharge:fair    Admit Diagnoses: rt middle cerebral artery thrombus    Discharge Diagnoses: rt middle cerebral artery infaection  hypertensionhyperlipidemia    Consults:  IP CONSULT TO NEUROLOGY  IP CONSULT TO PHYSICAL MEDICINE REHAB    Procedures: tpa and retrevial of thrombus    Hospital Course: 66 yr old fell and suffered rt cerebral arterythrombus that was retreived after tpa; he had excellent recoveryand sent to acute rehab. LABS:  No results for input(s): NA, K, CL, CO2, BUN, CREATININE, GLUCOSE, CALCIUM in the last 72 hours. No results for input(s): WBC, RBC, HGB, HCT, MCV, MCH, MCHC, RDW, PLT, MPV in the last 72 hours. No results for input(s): GLUMET in the last 72 hours. Patient Instructions:   Discharge Medication List as of 4/15/2021  5:49 PM      CONTINUE these medications which have NOT CHANGED    Details   metoprolol tartrate (LOPRESSOR) 50 MG tablet Take 50 mg by mouth 2 times dailyHistorical Med      aspirin 81 MG tablet Take 81 mg by mouth every other dayHistorical Med      atorvastatin (LIPITOR) 80 MG tablet Take 80 mg by mouth daily. Historical Med      clopidogrel (PLAVIX) 75 MG tablet Take 75 mg by mouth daily.   Historical Med      baclofen (LIORESAL) 10 MG tablet Take 1 tablet by mouth 3 times dailyDC to SNF      captopril (CAPOTEN) 50 MG tablet Take 50 mg by mouth 3 times dailyHistorical Med                 Signed:  Electronically signed by Eveline Anderson MD on 5/4/2021 at 6:37 PM

## 2024-01-05 NOTE — PLAN OF CARE
Problem: Pain:  Goal: Pain level will decrease  Description: Pain level will decrease  Outcome: Met This Shift None

## 2025-06-19 NOTE — H&P
HISTORY AND PHYSICAL EXAM    Kobe Smith is a 75 y.o. male with a history of recurrent gross hematuria on anticoagulation with Eliquis.  He had office cystoscopy in the past by Dr. Kuo.    Past Medical History:   Diagnosis Date    CAD (coronary artery disease)     Cerebral artery occlusion with cerebral infarction (HCC)     Hyperlipidemia     Hypertension     MI, old        Past Surgical History:   Procedure Laterality Date    CORONARY ANGIOPLASTY WITH STENT PLACEMENT      INSERTABLE CARDIAC MONITOR      reveal linq cardiac monitor    IR MECHANICAL ART THROMBECTOMY INTRACRANIAL  4/13/2021    IR MECHANICAL ART THROMBECTOMY INTRACRANIAL 4/13/2021 MD ANTON Quijano [facility]    OTHER SURGICAL HISTORY  07/12/2016    Dr. Woody- LINQ insertion       No family history on file.    Prior to Admission medications    Medication Sig Start Date End Date Taking? Authorizing Provider   fluticasone-umeclidin-vilant (TRELEGY ELLIPTA) 100-62.5-25 MCG/INH AEPB Inhale 1 puff into the lungs daily 4/30/21   Srinivas Townsend MD   bismuth subsalicylate (PEPTO BISMOL) 262 MG/15ML suspension Take 30 mLs by mouth every 6 hours as needed for Indigestion or Diarrhea 4/30/21   Srinivas Townsend MD   losartan (COZAAR) 50 MG tablet Take 1 tablet by mouth daily 4/30/21   Srinivas Townsend MD   fluticasone (FLONASE) 50 MCG/ACT nasal spray 2 sprays by Each Nostril route daily 4/30/21   Srinivas Townsend MD   ferrous sulfate (IRON 325) 325 (65 Fe) MG tablet Take 1 tablet by mouth daily (with breakfast) 4/30/21   Srinivas Townsend MD   tamsulosin (FLOMAX) 0.4 MG capsule Take 1 capsule by mouth nightly 4/30/21   Srinivas Townsend MD   baclofen (LIORESAL) 10 MG tablet Take 1 tablet by mouth 3 times daily 5/23/18   Tavon Brown MD   metoprolol tartrate (LOPRESSOR) 50 MG tablet Take 50 mg by mouth 2 times daily    ProviderLuan MD   aspirin 81 MG tablet Take 81 mg by mouth every other day

## 2025-06-20 RX ORDER — MIRTAZAPINE 7.5 MG/1
7.5 TABLET, FILM COATED ORAL NIGHTLY
COMMUNITY

## 2025-06-20 RX ORDER — IRBESARTAN 300 MG/1
300 TABLET ORAL NIGHTLY
COMMUNITY

## 2025-06-20 RX ORDER — SPIRONOLACTONE 25 MG/1
12.5 TABLET ORAL DAILY
COMMUNITY

## 2025-06-20 RX ORDER — FINASTERIDE 5 MG/1
5 TABLET, FILM COATED ORAL DAILY
COMMUNITY

## 2025-06-20 NOTE — PROGRESS NOTES
[facility]   PRE-ADMISSION TESTING GENERAL INSTRUCTIONS  PAT Phone Number: [phone]      GENERAL INSTRUCTIONS:    [x] Antibacterial Soap Shower Night before AND the Morning of procedure.  [x] Do not wear lotions, powders, deodorant the morning of surgery.  [x] No solid food after midnight. You may have SIPS of clear liquids up until 2 hours before your arrival time to the hospital.   [x] You may brush your teeth, gargle, but do not swallow water.   [x] No tobacco products, illegal drugs, or alcohol within 24 hours of your surgery.  [x] Jewelry or valuables should not be brought to the hospital. All body and/or tongue piercing's must be removed prior to arriving to hospital. No contact lens or hair pins.   [x] Arrange transportation with a responsible adult  to and from the hospital. Arrange for someone to be with you for the remainder of the day and for 24 hours after your procedure due to having had anesthesia.          -Who will be your  for transportation? wife        -Who will be staying with you for 24 hrs after your procedure? wife  [x] Bring insurance card and photo ID.  [x] Bring copy of living will or healthcare power of  papers to be placed in your electronic record.       PARKING INSTRUCTIONS:     [x] ARRIVAL DATE & TIME: 6/26 at 11 am  [x] Times are subject to change. We will contact you the business day before surgery if that were to occur.  [x] Enter into the [facility] Entrance. Two people may accompany you. Masks are not required.  [x] Parking Lot \"I\" is where you will park. It is located on the corner of [address] and [address]. The entrance is on [address].   Only one vehicle - per patient, is permitted in parking lot.   Upon entering the parking lot, a voucher ticket will print.        MEDICATION INSTRUCTIONS:    [x] Bring a complete list of your medications, please write the last time you took the medicine, give this list

## 2025-06-26 ENCOUNTER — APPOINTMENT (OUTPATIENT)
Dept: GENERAL RADIOLOGY | Age: 75
End: 2025-06-26
Attending: UROLOGY
Payer: MEDICARE

## 2025-06-26 ENCOUNTER — ANESTHESIA EVENT (OUTPATIENT)
Dept: OPERATING ROOM | Age: 75
End: 2025-06-26
Payer: MEDICARE

## 2025-06-26 ENCOUNTER — HOSPITAL ENCOUNTER (OUTPATIENT)
Age: 75
Setting detail: OUTPATIENT SURGERY
Discharge: HOME | End: 2025-06-26
Attending: UROLOGY | Admitting: UROLOGY
Payer: MEDICARE

## 2025-06-26 ENCOUNTER — ANESTHESIA (OUTPATIENT)
Dept: OPERATING ROOM | Age: 75
End: 2025-06-26
Payer: MEDICARE

## 2025-06-26 VITALS
SYSTOLIC BLOOD PRESSURE: 133 MMHG | TEMPERATURE: 98.2 F | HEART RATE: 88 BPM | BODY MASS INDEX: 36.45 KG/M2 | HEIGHT: 73 IN | OXYGEN SATURATION: 98 % | WEIGHT: 275 LBS | DIASTOLIC BLOOD PRESSURE: 83 MMHG | RESPIRATION RATE: 20 BRPM

## 2025-06-26 DIAGNOSIS — N30.10 CHRONIC INTERSTITIAL CYSTITIS: ICD-10-CM

## 2025-06-26 LAB — PSA SERPL-MCNC: 0.18 NG/ML (ref 0–4)

## 2025-06-26 PROCEDURE — 7100000010 HC PHASE II RECOVERY - FIRST 15 MIN: Performed by: UROLOGY

## 2025-06-26 PROCEDURE — 7100000011 HC PHASE II RECOVERY - ADDTL 15 MIN: Performed by: UROLOGY

## 2025-06-26 PROCEDURE — 6360000002 HC RX W HCPCS: Performed by: UROLOGY

## 2025-06-26 PROCEDURE — 3700000000 HC ANESTHESIA ATTENDED CARE: Performed by: UROLOGY

## 2025-06-26 PROCEDURE — 88305 TISSUE EXAM BY PATHOLOGIST: CPT

## 2025-06-26 PROCEDURE — 2580000003 HC RX 258: Performed by: NURSE ANESTHETIST, CERTIFIED REGISTERED

## 2025-06-26 PROCEDURE — 2709999900 HC NON-CHARGEABLE SUPPLY: Performed by: UROLOGY

## 2025-06-26 PROCEDURE — 84153 ASSAY OF PSA TOTAL: CPT

## 2025-06-26 PROCEDURE — 2580000003 HC RX 258: Performed by: UROLOGY

## 2025-06-26 PROCEDURE — 74420 UROGRAPHY RTRGR +-KUB: CPT

## 2025-06-26 PROCEDURE — C1758 CATHETER, URETERAL: HCPCS | Performed by: UROLOGY

## 2025-06-26 PROCEDURE — 3600000014 HC SURGERY LEVEL 4 ADDTL 15MIN: Performed by: UROLOGY

## 2025-06-26 PROCEDURE — 6370000000 HC RX 637 (ALT 250 FOR IP): Performed by: NURSE ANESTHETIST, CERTIFIED REGISTERED

## 2025-06-26 PROCEDURE — 3600000004 HC SURGERY LEVEL 4 BASE: Performed by: UROLOGY

## 2025-06-26 PROCEDURE — 88112 CYTOPATH CELL ENHANCE TECH: CPT

## 2025-06-26 PROCEDURE — 3700000001 HC ADD 15 MINUTES (ANESTHESIA): Performed by: UROLOGY

## 2025-06-26 PROCEDURE — 6360000002 HC RX W HCPCS: Performed by: NURSE ANESTHETIST, CERTIFIED REGISTERED

## 2025-06-26 RX ORDER — SODIUM CHLORIDE 0.9 % (FLUSH) 0.9 %
5-40 SYRINGE (ML) INJECTION EVERY 12 HOURS SCHEDULED
Status: DISCONTINUED | OUTPATIENT
Start: 2025-06-26 | End: 2025-06-26 | Stop reason: HOSPADM

## 2025-06-26 RX ORDER — ALBUTEROL SULFATE 90 UG/1
INHALANT RESPIRATORY (INHALATION)
Status: DISCONTINUED | OUTPATIENT
Start: 2025-06-26 | End: 2025-06-26 | Stop reason: SDUPTHER

## 2025-06-26 RX ORDER — IBUPROFEN 400 MG/1
400 TABLET, FILM COATED ORAL EVERY 8 HOURS PRN
Qty: 10 TABLET | Refills: 0 | Status: SHIPPED | OUTPATIENT
Start: 2025-06-26

## 2025-06-26 RX ORDER — FENTANYL CITRATE 50 UG/ML
INJECTION, SOLUTION INTRAMUSCULAR; INTRAVENOUS
Status: DISCONTINUED | OUTPATIENT
Start: 2025-06-26 | End: 2025-06-26 | Stop reason: SDUPTHER

## 2025-06-26 RX ORDER — SODIUM CHLORIDE 0.9 % (FLUSH) 0.9 %
5-40 SYRINGE (ML) INJECTION PRN
Status: DISCONTINUED | OUTPATIENT
Start: 2025-06-26 | End: 2025-06-26 | Stop reason: HOSPADM

## 2025-06-26 RX ORDER — PHENAZOPYRIDINE HYDROCHLORIDE 100 MG/1
100 TABLET, FILM COATED ORAL 3 TIMES DAILY PRN
Status: DISCONTINUED | OUTPATIENT
Start: 2025-06-26 | End: 2025-06-26 | Stop reason: HOSPADM

## 2025-06-26 RX ORDER — MIDAZOLAM HYDROCHLORIDE 1 MG/ML
INJECTION, SOLUTION INTRAMUSCULAR; INTRAVENOUS
Status: DISCONTINUED | OUTPATIENT
Start: 2025-06-26 | End: 2025-06-26 | Stop reason: SDUPTHER

## 2025-06-26 RX ORDER — IBUPROFEN 400 MG/1
400 TABLET, FILM COATED ORAL EVERY 8 HOURS PRN
Status: DISCONTINUED | OUTPATIENT
Start: 2025-06-26 | End: 2025-06-26 | Stop reason: HOSPADM

## 2025-06-26 RX ORDER — SODIUM CHLORIDE 9 MG/ML
INJECTION, SOLUTION INTRAVENOUS CONTINUOUS
Status: DISCONTINUED | OUTPATIENT
Start: 2025-06-26 | End: 2025-06-26 | Stop reason: HOSPADM

## 2025-06-26 RX ORDER — PHENAZOPYRIDINE HYDROCHLORIDE 100 MG/1
100 TABLET, FILM COATED ORAL 3 TIMES DAILY PRN
Qty: 10 TABLET | Refills: 0 | Status: SHIPPED | OUTPATIENT
Start: 2025-06-26 | End: 2025-06-29

## 2025-06-26 RX ORDER — ONDANSETRON 2 MG/ML
4 INJECTION INTRAMUSCULAR; INTRAVENOUS EVERY 6 HOURS PRN
Status: DISCONTINUED | OUTPATIENT
Start: 2025-06-26 | End: 2025-06-26 | Stop reason: HOSPADM

## 2025-06-26 RX ORDER — PROPOFOL 10 MG/ML
INJECTION, EMULSION INTRAVENOUS
Status: DISCONTINUED | OUTPATIENT
Start: 2025-06-26 | End: 2025-06-26 | Stop reason: SDUPTHER

## 2025-06-26 RX ORDER — SODIUM CHLORIDE 9 MG/ML
INJECTION, SOLUTION INTRAVENOUS
Status: DISCONTINUED | OUTPATIENT
Start: 2025-06-26 | End: 2025-06-26 | Stop reason: SDUPTHER

## 2025-06-26 RX ORDER — SODIUM CHLORIDE 9 MG/ML
INJECTION, SOLUTION INTRAVENOUS PRN
Status: DISCONTINUED | OUTPATIENT
Start: 2025-06-26 | End: 2025-06-26 | Stop reason: HOSPADM

## 2025-06-26 RX ORDER — GENTAMICIN SULFATE 80 MG/50ML
80 INJECTION, SOLUTION INTRAVENOUS
Status: COMPLETED | OUTPATIENT
Start: 2025-06-26 | End: 2025-06-26

## 2025-06-26 RX ADMIN — FENTANYL CITRATE 25 MCG: 50 INJECTION, SOLUTION INTRAMUSCULAR; INTRAVENOUS at 13:13

## 2025-06-26 RX ADMIN — MIDAZOLAM 1 MG: 1 INJECTION INTRAMUSCULAR; INTRAVENOUS at 13:01

## 2025-06-26 RX ADMIN — PROPOFOL 50 MCG/KG/MIN: 10 INJECTION, EMULSION INTRAVENOUS at 13:03

## 2025-06-26 RX ADMIN — FENTANYL CITRATE 25 MCG: 50 INJECTION, SOLUTION INTRAMUSCULAR; INTRAVENOUS at 13:27

## 2025-06-26 RX ADMIN — SODIUM CHLORIDE: 0.9 INJECTION, SOLUTION INTRAVENOUS at 12:27

## 2025-06-26 RX ADMIN — SODIUM CHLORIDE: 9 INJECTION, SOLUTION INTRAVENOUS at 12:55

## 2025-06-26 RX ADMIN — GENTAMICIN SULFATE 80 MG: 80 INJECTION, SOLUTION INTRAVENOUS at 13:06

## 2025-06-26 RX ADMIN — FENTANYL CITRATE 25 MCG: 50 INJECTION, SOLUTION INTRAMUSCULAR; INTRAVENOUS at 13:03

## 2025-06-26 RX ADMIN — ALBUTEROL SULFATE 8 PUFF: 90 AEROSOL, METERED RESPIRATORY (INHALATION) at 13:15

## 2025-06-26 ASSESSMENT — PAIN - FUNCTIONAL ASSESSMENT
PAIN_FUNCTIONAL_ASSESSMENT: NONE - DENIES PAIN
PAIN_FUNCTIONAL_ASSESSMENT: 0-10

## 2025-06-26 ASSESSMENT — PAIN DESCRIPTION - DESCRIPTORS: DESCRIPTORS: SHARP

## 2025-06-26 NOTE — ANESTHESIA POSTPROCEDURE EVALUATION
Department of Anesthesiology  Postprocedure Note    Patient: Kobe Smith  MRN: [id number]  YOB: 1950  Date of evaluation: 6/27/2025    Procedure Summary       Date: 06/26/25 Room / Location: [address]    Anesthesia Start:  Anesthesia Stop:     Procedure: CYSTOSCOPY BLADDER BIOPSY WITH FULGARATION (Bladder) Diagnosis:       Chronic interstitial cystitis      (Chronic interstitial cystitis [N30.10])    Surgeons: Sushil Bartholomew MD Responsible Provider:     Anesthesia Type: MAC ASA Status: 4 - Emergent            Anesthesia Type: No value filed.    Daryl Phase I: Daryl Score: 10    Daryl Phase II: Daryl Score: 10    Anesthesia Post Evaluation    Patient location during evaluation: PACU  Patient participation: complete - patient participated  Level of consciousness: awake  Pain score: 3  Airway patency: patent  Nausea & Vomiting: no nausea and no vomiting  Cardiovascular status: blood pressure returned to baseline  Respiratory status: acceptable  Hydration status: euvolemic      No notable events documented.

## 2025-06-26 NOTE — OP NOTE
[facility]  Urology Post-operative Note    Kobe Smith  YOB: 1950  [id number]    Pre-operative Diagnosis: BPH, recurrent gross hematuria    Post-operative Diagnosis: Hemorrhagic cystitis and possible hypotonic bladder    Procedure: Cystourethroscopy, bladder washings for cytology, bilateral retrograde pyelography, bladder biopsy and fulguration, rectal exam    Surgeon: Sushil Bartholomew MD    Anesthesia:   LMAC    Estimated Blood Loss:   Minimal    Complications: None    Drains: None    Specimen(s): Bladder washings and bladder biopsies    Clinical Note:   75-year-old male with intermittent gross hematuria for nearly the last year.  He takes extensive blood thinners for cardiac issues.  He does not have hematuria when he is not taking a blood thinner such as now.  He presents for the above listed procedure.  The risks and benefits of the procedure including but not limited to infection, bleeding, possible need for a catheter with catheter irritation etc. were discussed in detail and he elected to proceed.    Operative Description:   He was taken the operating room and placed on the OR table in the supine position.  He received IV antibiotics preoperatively.  Following induction of anesthesia, he was repositioned into the dorsolithotomy position.  Rectal exam reveals a 45 g smooth nonnodular nontender prostate.  He has good sphincter tone.  He has no rectal masses.  His seminal vesicles are normal.  His external genitalia and abdomen were prepped and draped sterilely.  A 21 English cystoscope with 30 degree lens was inserted per urethra and into the bladder.  There were no urethral strictures false passages or tumors.  The prostatic urethra was patent.  There was minimal if any friable prostate tissue.  There were enlarged lateral lobes but they were not causing significant outlet obstruction.  There is no middle lobe.  The bladder showed a large amount of debris.  The bladder  urothelium were then cauterized until there is meticulous hemostasis and no active bleeding.  The bladder was emptied and the cystoscope was removed.  He tolerated the procedure well was taken to the PACU in stable condition.  He was discharged home with prescriptions for Bactrim, ibuprofen, Pyridium.  He will follow-up at his scheduled appointment on 10/31/2025.  He will be contacted with the biopsy results.  He will have a discussion with his cardiologist in the near future to discuss other alternatives to lesser anticoagulants versus getting optimal together with the potential Watchman procedure in order to prevent recurrent and persistent gross hematuria which is likely.  He will come into the office in a week or 2 to have a bladder scan PVR management as well.      Sushil Bartholomew MD  6/26/2025  1:55 PM

## 2025-06-26 NOTE — ANESTHESIA PRE PROCEDURE
Department of Anesthesiology  Preprocedure Note       Name:  Kobe Smith   Age:  75 y.o.  :  1950                                          MRN:  [id number]         Date:  2025      Surgeon: Surgeon(s):  Sushil Bartholomew MD    Procedure: Procedure(s):  CYSTOSCOPY BLADDER BIOPSY WITH FULGARATION    Medications prior to admission:   Prior to Admission medications    Medication Sig Start Date End Date Taking? Authorizing Provider   apixaban (ELIQUIS) 5 MG TABS tablet Take by mouth 2 times daily   Yes Luan Portillo MD   finasteride (PROSCAR) 5 MG tablet Take 1 tablet by mouth daily   Yes Luan Portillo MD   mirtazapine (REMERON) 7.5 MG tablet Take 1 tablet by mouth nightly   Yes Luan Portillo MD   irbesartan (AVAPRO) 300 MG tablet Take 1 tablet by mouth nightly   Yes Luan Portillo MD   spironolactone (ALDACTONE) 25 MG tablet Take 0.5 tablets by mouth daily   Yes Luan Portillo MD   fluticasone-umeclidin-vilant (TRELEGY ELLIPTA) 100-62.5-25 MCG/INH AEPB Inhale 1 puff into the lungs daily 21   Srinivsa Townsend MD   bismuth subsalicylate (PEPTO BISMOL) 262 MG/15ML suspension Take 30 mLs by mouth every 6 hours as needed for Indigestion or Diarrhea 21   Srinivas Townsend MD   losartan (COZAAR) 50 MG tablet Take 1 tablet by mouth daily 21   Srinivas Townsend MD   fluticasone (FLONASE) 50 MCG/ACT nasal spray 2 sprays by Each Nostril route daily 21   Srinivas Townsend MD   ferrous sulfate (IRON 325) 325 (65 Fe) MG tablet Take 1 tablet by mouth daily (with breakfast) 21   Srinivas Townsend MD   tamsulosin (FLOMAX) 0.4 MG capsule Take 1 capsule by mouth nightly 21   Srinivas Townsend MD   baclofen (LIORESAL) 10 MG tablet Take 1 tablet by mouth 3 times daily 18   Tavon Brown MD   metoprolol tartrate (LOPRESSOR) 50 MG tablet Take 50 mg by mouth 2 times daily    Provider MD Luan   aspirin 81 MG tablet Take 1

## 2025-06-26 NOTE — H&P
HISTORY AND PHYSICAL EXAM     Kobe Smith is a 75 y.o. male with a history of recurrent gross hematuria on anticoagulation with Eliquis.  He had office cystoscopy in the past by Dr. Kuo.     Past Medical History        Past Medical History:   Diagnosis Date    CAD (coronary artery disease)      Cerebral artery occlusion with cerebral infarction (HCC)      Hyperlipidemia      Hypertension      MI, old              Past Surgical History         Past Surgical History:   Procedure Laterality Date    CORONARY ANGIOPLASTY WITH STENT PLACEMENT        INSERTABLE CARDIAC MONITOR         reveal linq cardiac monitor    IR MECHANICAL ART THROMBECTOMY INTRACRANIAL   4/13/2021     IR MECHANICAL ART THROMBECTOMY INTRACRANIAL 4/13/2021 MD [facility] Quij[facility]    OTHER SURGICAL HISTORY   07/12/2016     Dr. Woody- LINISAIAS insertion            Family History   No family history on file.        Home Medications           Prior to Admission medications    Medication Sig Start Date End Date Taking? Authorizing Provider   fluticasone-umeclidin-vilant (TRELEGY ELLIPTA) 100-62.5-25 MCG/INH AEPB Inhale 1 puff into the lungs daily 4/30/21     Srinivas Townsend MD   bismuth subsalicylate (PEPTO BISMOL) 262 MG/15ML suspension Take 30 mLs by mouth every 6 hours as needed for Indigestion or Diarrhea 4/30/21     Srinivas Townsend MD   losartan (COZAAR) 50 MG tablet Take 1 tablet by mouth daily 4/30/21     Srinivas Townsend MD   fluticasone (FLONASE) 50 MCG/ACT nasal spray 2 sprays by Each Nostril route daily 4/30/21     Srinivas Townsend MD   ferrous sulfate (IRON 325) 325 (65 Fe) MG tablet Take 1 tablet by mouth daily (with breakfast) 4/30/21     Srinivas Townsend MD   tamsulosin (FLOMAX) 0.4 MG capsule Take 1 capsule by mouth nightly 4/30/21     Srinivas Townsend MD   baclofen (LIORESAL) 10 MG tablet Take 1 tablet by mouth 3 times daily 5/23/18     Tavon Brown MD   metoprolol tartrate

## 2025-06-26 NOTE — PROGRESS NOTES
Anesthesia notified that they need to go over the Addendum to the surgical consent for all patients with a DNR form with the patient.

## 2025-06-26 NOTE — PROGRESS NOTES
Patient admitted to DA Patient placed on appropriate monitors. Patient assisted with liquids and nutrition. Pt verified using 2 Identifiers and ID band with OR staff prior to acceptance to PACU/Phase II care.

## 2025-06-26 NOTE — PROGRESS NOTES
Dr Bartholomew notified that patient needs code status updated because patient has forms stating he is a DNR Comfort care- arrest

## 2025-06-26 NOTE — BRIEF OP NOTE
Brief Postoperative Note      Patient: Kobe Smith  YOB: 1950  MRN: [id number]    Date of Procedure: 6/26/2025    Pre-Op Diagnosis Codes:      * Chronic interstitial cystitis [N30.10]    Post-Op Diagnosis: Same       Procedure(s):  CYSTOSCOPY BLADDER BIOPSY WITH FULGARATION    Surgeon(s):  Sushil Bartholomew MD    Assistant:  * No surgical staff found *    Anesthesia: Monitor Anesthesia Care    Estimated Blood Loss (mL): Minimal    Complications: None    Specimens:   ID Type Source Tests Collected by Time Destination   A : Bladder washings Urine Urine, Cystoscopic CYTOLOGY, NON-GYN Sushil Bartholomew MD 6/26/2025 1314    B : Bladder Biopsy Tissue Tissue SURGICAL PATHOLOGY Sushil Bartholomew MD 6/26/2025 1328        Implants:  * No implants in log *      Drains:   [REMOVED] Urinary Catheter 04/22/21 Latex (Removed)       Findings:  Infection Present At Time Of Surgery (PATOS) (choose all levels that have infection present):  No infection present  Other Findings: Cystitis    Electronically signed by Sushil Bartholomew MD on 6/26/2025 at 1:51 PM

## 2025-06-26 NOTE — DISCHARGE INSTRUCTIONS
Blood in the urine is to be expected  Increase oral fluid intake for 2-3 days  My office will make arrangements for you to have a bladder scan measurement in approximately 2 to 3 weeks  Call the [facility] (Dr. Perez/[place]) office to confirm your next appointment on 10/31/2025--[phone]  Resume blood thinners on Saturday if there is no sign of active bleeding  Contact your cardiologist to see if there are any other alternatives in terms of blood thinners since this is exacerbating the bleeding from your bladder and will likely continue to do so  Resume a normal diet  No heavy lifting or physical activity for 7 days  OK to shower

## 2025-06-30 LAB — NON-GYN CYTOLOGY REPORT: NORMAL

## 2025-07-01 LAB — SURGICAL PATHOLOGY REPORT: NORMAL

## (undated) DEVICE — CATHETER URET 5FR L70CM OPN END SGL LUMN INJ HUB FLEXIMA

## (undated) DEVICE — SOLUTION IRRIG 1000ML H2O PIC PLAS SHATTERPROOF CONTAINER

## (undated) DEVICE — CYSTO: Brand: MEDLINE INDUSTRIES, INC.

## (undated) DEVICE — SYRINGE MED 20ML STD CLR PLAS LUERLOCK TIP N CTRL DISP

## (undated) DEVICE — 4-PORT MANIFOLD: Brand: NEPTUNE 2

## (undated) DEVICE — GLOVE ORANGE PI 7 1/2   MSG9075

## (undated) DEVICE — SOLUTION IRRG H2O 3000 ML USP STRL TITAN XL CONTAINER

## (undated) DEVICE — SYRINGE MED 10ML LUERLOCK TIP W/O SFTY DISP

## (undated) DEVICE — TUBING, SUCTION, 3/16" X 12', STRAIGHT: Brand: MEDLINE

## (undated) DEVICE — GOWN,SIRUS,FABRNF,XL,20/CS: Brand: MEDLINE

## (undated) DEVICE — PAD,NON-ADHERENT,2X3,STERILE,LF,1/PK: Brand: MEDLINE

## (undated) DEVICE — HOSE CONN FOR WST MGMT SYS NEPTUNE 2

## (undated) DEVICE — ELECTRODE PT RET AD L9FT HI MOIST COND ADH HYDRGEL CORDED